# Patient Record
Sex: FEMALE | Race: WHITE | NOT HISPANIC OR LATINO | Employment: FULL TIME | URBAN - METROPOLITAN AREA
[De-identification: names, ages, dates, MRNs, and addresses within clinical notes are randomized per-mention and may not be internally consistent; named-entity substitution may affect disease eponyms.]

---

## 2017-06-15 ENCOUNTER — OFFICE VISIT (OUTPATIENT)
Dept: MEDICAL GROUP | Facility: MEDICAL CENTER | Age: 72
End: 2017-06-15
Payer: MEDICARE

## 2017-06-15 VITALS
RESPIRATION RATE: 14 BRPM | SYSTOLIC BLOOD PRESSURE: 138 MMHG | BODY MASS INDEX: 45.32 KG/M2 | DIASTOLIC BLOOD PRESSURE: 80 MMHG | TEMPERATURE: 98 F | HEIGHT: 65 IN | WEIGHT: 272 LBS

## 2017-06-15 DIAGNOSIS — M76.32 IT BAND SYNDROME, LEFT: ICD-10-CM

## 2017-06-15 DIAGNOSIS — Z12.11 SCREEN FOR COLON CANCER: ICD-10-CM

## 2017-06-15 DIAGNOSIS — Z13.6 SCREENING FOR CARDIOVASCULAR CONDITION: ICD-10-CM

## 2017-06-15 DIAGNOSIS — R53.83 FATIGUE, UNSPECIFIED TYPE: ICD-10-CM

## 2017-06-15 DIAGNOSIS — E66.01 MORBID OBESITY WITH BMI OF 45.0-49.9, ADULT (HCC): ICD-10-CM

## 2017-06-15 DIAGNOSIS — Z12.31 ENCOUNTER FOR SCREENING MAMMOGRAM FOR MALIGNANT NEOPLASM OF BREAST: ICD-10-CM

## 2017-06-15 DIAGNOSIS — M79.18 RHOMBOID MUSCLE PAIN: ICD-10-CM

## 2017-06-15 PROCEDURE — 99203 OFFICE O/P NEW LOW 30 MIN: CPT | Performed by: FAMILY MEDICINE

## 2017-06-15 ASSESSMENT — PATIENT HEALTH QUESTIONNAIRE - PHQ9: CLINICAL INTERPRETATION OF PHQ2 SCORE: 2

## 2017-06-15 NOTE — PROGRESS NOTES
cc:  Shoulder pain    Subjective:     Ruby M Emmerling is a 71 y.o. female presenting with her daughter and two grandkids to establish care:    1. Left shoulder pain: started about 3 weeks ago after waking up from a nap on her couch.  Sharp pain that occasionally radiates to her biceps.  Associated with some numbness in index and middle finger.  Takes advil a couple times a day with good relief.  Overall is improving.  No swelling, redness, tingling, weakness, fevers, neck pain. No recent increased activity    2. Left thigh pain: started over a year ago, stable.  Intermittent dull achy pain, doesn't radiate.  Associated with nothing.  better with rest. Worse with certain movements and after standing for long periods.      3. Blood pressure: was told in the past that she had hypertension, was prescribed lisinopril but she never took it.  Takes BPs at home, is less than 140/90. Denies any chest pain, shortness of breath, leg swelling, jaw claudication, lightheadedness, dizziness.      Review of systems:  See above.      Current outpatient prescriptions:   •  ASPIRIN PO, Take  by mouth., Disp: , Rfl:     Allergies   Allergen Reactions   • Demerol Vomiting       Past Medical History   Diagnosis Date   • Breast lump      lumpectomy     Past Surgical History   Procedure Laterality Date   • Ankle fusion     • Cholecystectomy       Family History   Problem Relation Age of Onset   • Cancer Mother    • Cancer Sister    • Breast Cancer Maternal Aunt    • Heart Disease Sister    • Cancer Sister      skin     Social History     Social History   • Marital Status: Single     Spouse Name: N/A   • Number of Children: N/A   • Years of Education: N/A     Occupational History   • Not on file.     Social History Main Topics   • Smoking status: Former Smoker   • Smokeless tobacco: Never Used   • Alcohol Use: No   • Drug Use: No   • Sexual Activity: Not on file     Other Topics Concern   • Not on file     Social History Narrative  "      Objective:     Vitals: /80 mmHg  Temp(Src) 36.7 °C (98 °F)  Resp 14  Ht 1.651 m (5' 5\")  Wt 123.378 kg (272 lb)  BMI 45.26 kg/m2  General: Alert, pleasant, NAD  HEENT: Normocephalic.  PERRL, EOMI, no icterus or pallor.  Conjunctivae and lids normal. External ears normal. Neck supple.  No thyromegaly or masses palpated. No cervical or supraclavicular lymphadenopathy.  Heart: Regular rate and rhythm.  S1 and S2 normal.  No murmurs appreciated.  Respiratory: Normal respiratory effort.  Clear to auscultation bilaterally.    Abdomen: Non-distended, soft  Skin: Warm, dry, no rashes.  Musculoskeletal: Gait is normal.  Moves all extremities well.  Extremities: No leg edema.    Neurological: No tremors  Psych:  Affect/mood is normal, judgement is good, memory is intact, grooming is appropriate.    Assessment/Plan:     Luda was seen today for shoulder pain and numbness.    Diagnoses and all orders for this visit:    IT band syndrome, left  Handout given of exercises and stretches to try. otc pain relief discussed    Rhomboid muscle pain  Handout given of exercises and stretches to try.  otc pain relief discussed    Morbid obesity with BMI of 45.0-49.9, adult (CMS-[facility])  -     Patient identified as having weight management issue.  Appropriate orders and counseling given.  -     LIPID PROFILE; Future  -     BLOOD GLUCOSE; Future    Screening for cardiovascular condition  -     LIPID PROFILE; Future  -     BLOOD GLUCOSE; Future    Fatigue, unspecified type  -     BLOOD GLUCOSE; Future    Screen for colon cancer  -     OCCULT BLOOD FECES IMMUNOASSAY; Future    Encounter for screening mammogram for malignant neoplasm of breast  -     MA-MAMMO SCREENING BILAT W/CELSO W/CAD; Future        Return in about 6 months (around 12/15/2017) for F/U blood pressure.  "

## 2017-06-15 NOTE — PATIENT INSTRUCTIONS
Hypertension  Hypertension is another name for high blood pressure. High blood pressure forces your heart to work harder to pump blood. A blood pressure reading has two numbers, which includes a higher number over a lower number (example: 110/72).  HOME CARE   · Have your blood pressure rechecked by your doctor.  · Only take medicine as told by your doctor. Follow the directions carefully. The medicine does not work as well if you skip doses. Skipping doses also puts you at risk for problems.  · Do not smoke.  · Monitor your blood pressure at home as told by your doctor.  GET HELP IF:  · You think you are having a reaction to the medicine you are taking.  · You have repeat headaches or feel dizzy.  · You have puffiness (swelling) in your ankles.  · You have trouble with your vision.  GET HELP RIGHT AWAY IF:   · You get a very bad headache and are confused.  · You feel weak, numb, or faint.  · You get chest or belly (abdominal) pain.  · You throw up (vomit).  · You cannot breathe very well.  MAKE SURE YOU:   · Understand these instructions.  · Will watch your condition.  · Will get help right away if you are not doing well or get worse.     This information is not intended to replace advice given to you by your health care provider. Make sure you discuss any questions you have with your health care provider.     Document Released: 06/05/2009 Document Revised: 12/23/2014 Document Reviewed: 10/10/2014  [employer] Interactive Patient Education ©2016 [employer]  Hypertension  Hypertension, commonly called high blood pressure, is when the force of blood pumping through your arteries is too strong. Your arteries are the blood vessels that carry blood from your heart throughout your body. A blood pressure reading consists of a higher number over a lower number, such as 110/72. The higher number (systolic) is the pressure inside your arteries when your heart pumps. The lower number (diastolic) is the pressure inside your  arteries when your heart relaxes. Ideally you want your blood pressure below 120/80.  Hypertension forces your heart to work harder to pump blood. Your arteries may become narrow or stiff. Having untreated or uncontrolled hypertension can cause heart attack, stroke, kidney disease, and other problems.  RISK FACTORS  Some risk factors for high blood pressure are controllable. Others are not.   Risk factors you cannot control include:   · Race. You may be at higher risk if you are .  · Age. Risk increases with age.  · Gender. Men are at higher risk than women before age 45 years. After age 65, women are at higher risk than men.  Risk factors you can control include:  · Not getting enough exercise or physical activity.  · Being overweight.  · Getting too much fat, sugar, calories, or salt in your diet.  · Drinking too much alcohol.  SIGNS AND SYMPTOMS  Hypertension does not usually cause signs or symptoms. Extremely high blood pressure (hypertensive crisis) may cause headache, anxiety, shortness of breath, and nosebleed.  DIAGNOSIS  To check if you have hypertension, your health care provider will measure your blood pressure while you are seated, with your arm held at the level of your heart. It should be measured at least twice using the same arm. Certain conditions can cause a difference in blood pressure between your right and left arms. A blood pressure reading that is higher than normal on one occasion does not mean that you need treatment. If it is not clear whether you have high blood pressure, you may be asked to return on a different day to have your blood pressure checked again. Or, you may be asked to monitor your blood pressure at home for 1 or more weeks.  TREATMENT  Treating high blood pressure includes making lifestyle changes and possibly taking medicine. Living a healthy lifestyle can help lower high blood pressure. You may need to change some of your habits.  Lifestyle changes may  include:  · Following the DASH diet. This diet is high in fruits, vegetables, and whole grains. It is low in salt, red meat, and added sugars.  · Keep your sodium intake below 2,300 mg per day.  · Getting at least 30-45 minutes of aerobic exercise at least 4 times per week.  · Losing weight if necessary.  · Not smoking.  · Limiting alcoholic beverages.  · Learning ways to reduce stress.  Your health care provider may prescribe medicine if lifestyle changes are not enough to get your blood pressure under control, and if one of the following is true:  · You are 18-59 years of age and your systolic blood pressure is above 140.  · You are 60 years of age or older, and your systolic blood pressure is above 150.  · Your diastolic blood pressure is above 90.  · You have diabetes, and your systolic blood pressure is over 140 or your diastolic blood pressure is over 90.  · You have kidney disease and your blood pressure is above 140/90.  · You have heart disease and your blood pressure is above 140/90.  Your personal target blood pressure may vary depending on your medical conditions, your age, and other factors.  HOME CARE INSTRUCTIONS  · Have your blood pressure rechecked as directed by your health care provider.    · Take medicines only as directed by your health care provider. Follow the directions carefully. Blood pressure medicines must be taken as prescribed. The medicine does not work as well when you skip doses. Skipping doses also puts you at risk for problems.  · Do not smoke.    · Monitor your blood pressure at home as directed by your health care provider.   SEEK MEDICAL CARE IF:   · You think you are having a reaction to medicines taken.  · You have recurrent headaches or feel dizzy.  · You have swelling in your ankles.  · You have trouble with your vision.  SEEK IMMEDIATE MEDICAL CARE IF:  · You develop a severe headache or confusion.  · You have unusual weakness, numbness, or feel faint.  · You have severe  chest or abdominal pain.  · You vomit repeatedly.  · You have trouble breathing.  MAKE SURE YOU:   · Understand these instructions.  · Will watch your condition.  · Will get help right away if you are not doing well or get worse.     This information is not intended to replace advice given to you by your health care provider. Make sure you discuss any questions you have with your health care provider.     Document Released: 12/18/2006 Document Revised: 05/03/2016 Document Reviewed: 10/10/2014  [employer] Interactive Patient Education ©2016 [employer]  Obesity  Obesity is defined as having too much total body fat and a body mass index (BMI) of 30 or more. BMI is an estimate of body fat and is calculated from your height and weight. BMI is typically calculated by your health care provider during regular wellness visits. Obesity happens when you consume more calories than you can burn by exercising or performing daily physical tasks. Prolonged obesity can cause major illnesses or emergencies, such as:  · Stroke.  · Heart disease.  · Diabetes.  · Cancer.  · Arthritis.  · High blood pressure (hypertension).  · High cholesterol.  · Sleep apnea.  · Erectile dysfunction.  · Infertility problems.  CAUSES   · Regularly eating unhealthy foods.  · Physical inactivity.  · Certain disorders, such as an underactive thyroid (hypothyroidism), Cushing's syndrome, and polycystic ovarian syndrome.  · Certain medicines, such as steroids, some depression medicines, and antipsychotics.  · Genetics.  · Lack of sleep.  DIAGNOSIS  A health care provider can diagnose obesity after calculating your BMI. Obesity will be diagnosed if your BMI is 30 or higher.  There are other methods of measuring obesity levels. Some other methods include measuring your skinfold thickness, your waist circumference, and comparing your hip circumference to your waist circumference.  TREATMENT   A healthy treatment program includes some or all of the  following:  · Long-term dietary changes.  · Exercise and physical activity.  · Behavioral and lifestyle changes.  · Medicine only under the supervision of your health care provider. Medicines may help, but only if they are used with diet and exercise programs.  If your BMI is 40 or higher, your health care provider may recommend specialized surgery or programs to help with weight loss.  An unhealthy treatment program includes:  · Fasting.  · Fad diets.  · Supplements and drugs.  These choices do not succeed in long-term weight control.  HOME CARE INSTRUCTIONS  · Exercise and perform physical activity as directed by your health care provider. To increase physical activity, try the following:  ¨ Use stairs instead of elevators.  ¨ Park farther away from store entrances.  ¨ Garden, bike, or walk instead of watching television or using the computer.  · Eat healthy, low-calorie foods and drinks on a regular basis. Eat more fruits and vegetables. Use low-calorie cookbooks or take healthy cooking classes.  · Limit fast food, sweets, and processed snack foods.  · Eat smaller portions.  · Keep a daily journal of everything you eat. There are many free websites to help you with this. It may be helpful to measure your foods so you can determine if you are eating the correct portion sizes.  · Avoid drinking alcohol. Drink more water and drinks without calories.  · Take vitamins and supplements only as recommended by your health care provider.  · Weight-loss support groups, registered dietitians, counselors, and stress reduction education can also be very helpful.  SEEK IMMEDIATE MEDICAL CARE IF:  · You have chest pain or tightness.  · You have trouble breathing or feel short of breath.  · You have weakness or leg numbness.  · You feel confused or have trouble talking.  · You have sudden changes in your vision.     This information is not intended to replace advice given to you by your health care provider. Make sure you discuss  any questions you have with your health care provider.     Document Released: 01/25/2006 Document Revised: 01/08/2016 Document Reviewed: 01/23/2013  [employer] Interactive Patient Education ©2016 [employer]

## 2017-06-15 NOTE — MR AVS SNAPSHOT
" Ruby M Emmerling   6/15/2017 3:40 PM   Office Visit   MRN: [id number]    Department:  [address] [place] [address]   Dept Phone:  [phone]    Description:  Female : 1945   Provider:  Lnae Dempsey M.D.           Reason for Visit     Shoulder Pain left pain    Numbness left side      Allergies as of 6/15/2017     Allergen Noted Reactions    Demerol 2013   Vomiting      You were diagnosed with     Morbid obesity with BMI of 45.0-49.9, adult ([facility])   [622973]       Screening for cardiovascular condition   [954929]       Fatigue, unspecified type   [9234878]       Screen for colon cancer   [312413]       Encounter for screening mammogram for malignant neoplasm of breast   [929899]         Vital Signs     Blood Pressure Temperature Respirations Height Weight Body Mass Index    138/80 mmHg 36.7 °C (98 °F) 14 1.651 m (5' 5\") 123.378 kg (272 lb) 45.26 kg/m2    Smoking Status                   Former Smoker           Basic Information     Date Of Birth Sex Race Ethnicity Preferred Language    1945 Female White Non- English      Your appointments     Dec 18, 2017 10:00 AM   Established Patient with Lane Dempsey M.D.   [facility] [address]    [address] [place] [id number] [phone]           You will be receiving a confirmation call a few days before your appointment from our automated call confirmation system.              Problem List              ICD-10-CM Priority Class Noted - Resolved    Morbid obesity with BMI of 45.0-49.9, adult ([facility]) E66.01, Z68.42   6/15/2017 - Present      Health Maintenance     Patient has no pending health maintenance at this time      Current Immunizations     Influenza TIV (IM) 2012      Below and/or attached are the medications your provider expects you to take. Review all of your home medications and newly ordered medications with your provider and/or pharmacist. Follow medication instructions as " directed by your provider and/or pharmacist. Please keep your medication list with you and share with your provider. Update the information when medications are discontinued, doses are changed, or new medications (including over-the-counter products) are added; and carry medication information at all times in the event of emergency situations     Allergies:  DEMEROL - Vomiting               Medications  Valid as of: Marilee 15, 2017 -  4:23 PM    Generic Name Brand Name Tablet Size Instructions for use    Aspirin   Take  by mouth.        .                 Medicines prescribed today were sent to:     [address] ([place]), [place] [address]    [facility] [place] [place] [address]    Phone: [phone] Fax: [phone]    Open 24 Hours?: No      Medication refill instructions:       If your prescription bottle indicates you have medication refills left, it is not necessary to call your provider’s office. Please contact your pharmacy and they will refill your medication.    If your prescription bottle indicates you do not have any refills left, you may request refills at any time through one of the following ways: The online [employer] system (except Urgent Care), by calling your provider’s office, or by asking your pharmacy to contact your provider’s office with a refill request. Medication refills are processed only during regular business hours and may not be available until the next business day. Your provider may request additional information or to have a follow-up visit with you prior to refilling your medication.   *Please Note: Medication refills are assigned a new Rx number when refilled electronically. Your pharmacy may indicate that no refills were authorized even though a new prescription for the same medication is available at the pharmacy. Please request the medicine by name with the pharmacy before contacting your provider for a refill.        Your To Do List     Future Labs/Procedures  Complete By Expires    BLOOD GLUCOSE  As directed 6/15/2018    LIPID PROFILE  As directed 6/15/2018    MA-MAMMO SCREENING BILAT W/CELOS W/CAD  As directed 6/15/2018    OCCULT BLOOD FECES IMMUNOASSAY  As directed 6/15/2018      Instructions    Hypertension  Hypertension is another name for high blood pressure. High blood pressure forces your heart to work harder to pump blood. A blood pressure reading has two numbers, which includes a higher number over a lower number (example: 110/72).  HOME CARE   · Have your blood pressure rechecked by your doctor.  · Only take medicine as told by your doctor. Follow the directions carefully. The medicine does not work as well if you skip doses. Skipping doses also puts you at risk for problems.  · Do not smoke.  · Monitor your blood pressure at home as told by your doctor.  GET HELP IF:  · You think you are having a reaction to the medicine you are taking.  · You have repeat headaches or feel dizzy.  · You have puffiness (swelling) in your ankles.  · You have trouble with your vision.  GET HELP RIGHT AWAY IF:   · You get a very bad headache and are confused.  · You feel weak, numb, or faint.  · You get chest or belly (abdominal) pain.  · You throw up (vomit).  · You cannot breathe very well.  MAKE SURE YOU:   · Understand these instructions.  · Will watch your condition.  · Will get help right away if you are not doing well or get worse.     This information is not intended to replace advice given to you by your health care provider. Make sure you discuss any questions you have with your health care provider.     Document Released: 06/05/2009 Document Revised: 12/23/2014 Document Reviewed: 10/10/2014  [employer] Interactive Patient Education ©2016 [employer]  Hypertension  Hypertension, commonly called high blood pressure, is when the force of blood pumping through your arteries is too strong. Your arteries are the blood vessels that carry blood from your heart throughout your  body. A blood pressure reading consists of a higher number over a lower number, such as 110/72. The higher number (systolic) is the pressure inside your arteries when your heart pumps. The lower number (diastolic) is the pressure inside your arteries when your heart relaxes. Ideally you want your blood pressure below 120/80.  Hypertension forces your heart to work harder to pump blood. Your arteries may become narrow or stiff. Having untreated or uncontrolled hypertension can cause heart attack, stroke, kidney disease, and other problems.  RISK FACTORS  Some risk factors for high blood pressure are controllable. Others are not.   Risk factors you cannot control include:   · Race. You may be at higher risk if you are .  · Age. Risk increases with age.  · Gender. Men are at higher risk than women before age 45 years. After age 65, women are at higher risk than men.  Risk factors you can control include:  · Not getting enough exercise or physical activity.  · Being overweight.  · Getting too much fat, sugar, calories, or salt in your diet.  · Drinking too much alcohol.  SIGNS AND SYMPTOMS  Hypertension does not usually cause signs or symptoms. Extremely high blood pressure (hypertensive crisis) may cause headache, anxiety, shortness of breath, and nosebleed.  DIAGNOSIS  To check if you have hypertension, your health care provider will measure your blood pressure while you are seated, with your arm held at the level of your heart. It should be measured at least twice using the same arm. Certain conditions can cause a difference in blood pressure between your right and left arms. A blood pressure reading that is higher than normal on one occasion does not mean that you need treatment. If it is not clear whether you have high blood pressure, you may be asked to return on a different day to have your blood pressure checked again. Or, you may be asked to monitor your blood pressure at home for 1 or more  weeks.  TREATMENT  Treating high blood pressure includes making lifestyle changes and possibly taking medicine. Living a healthy lifestyle can help lower high blood pressure. You may need to change some of your habits.  Lifestyle changes may include:  · Following the DASH diet. This diet is high in fruits, vegetables, and whole grains. It is low in salt, red meat, and added sugars.  · Keep your sodium intake below 2,300 mg per day.  · Getting at least 30-45 minutes of aerobic exercise at least 4 times per week.  · Losing weight if necessary.  · Not smoking.  · Limiting alcoholic beverages.  · Learning ways to reduce stress.  Your health care provider may prescribe medicine if lifestyle changes are not enough to get your blood pressure under control, and if one of the following is true:  · You are 18-59 years of age and your systolic blood pressure is above 140.  · You are 60 years of age or older, and your systolic blood pressure is above 150.  · Your diastolic blood pressure is above 90.  · You have diabetes, and your systolic blood pressure is over 140 or your diastolic blood pressure is over 90.  · You have kidney disease and your blood pressure is above 140/90.  · You have heart disease and your blood pressure is above 140/90.  Your personal target blood pressure may vary depending on your medical conditions, your age, and other factors.  HOME CARE INSTRUCTIONS  · Have your blood pressure rechecked as directed by your health care provider.    · Take medicines only as directed by your health care provider. Follow the directions carefully. Blood pressure medicines must be taken as prescribed. The medicine does not work as well when you skip doses. Skipping doses also puts you at risk for problems.  · Do not smoke.    · Monitor your blood pressure at home as directed by your health care provider.   SEEK MEDICAL CARE IF:   · You think you are having a reaction to medicines taken.  · You have recurrent headaches or  feel dizzy.  · You have swelling in your ankles.  · You have trouble with your vision.  SEEK IMMEDIATE MEDICAL CARE IF:  · You develop a severe headache or confusion.  · You have unusual weakness, numbness, or feel faint.  · You have severe chest or abdominal pain.  · You vomit repeatedly.  · You have trouble breathing.  MAKE SURE YOU:   · Understand these instructions.  · Will watch your condition.  · Will get help right away if you are not doing well or get worse.     This information is not intended to replace advice given to you by your health care provider. Make sure you discuss any questions you have with your health care provider.     Document Released: 12/18/2006 Document Revised: 05/03/2016 Document Reviewed: 10/10/2014  [employer] Interactive Patient Education ©2016 [employer] Inc.  Obesity  Obesity is defined as having too much total body fat and a body mass index (BMI) of 30 or more. BMI is an estimate of body fat and is calculated from your height and weight. BMI is typically calculated by your health care provider during regular wellness visits. Obesity happens when you consume more calories than you can burn by exercising or performing daily physical tasks. Prolonged obesity can cause major illnesses or emergencies, such as:  · Stroke.  · Heart disease.  · Diabetes.  · Cancer.  · Arthritis.  · High blood pressure (hypertension).  · High cholesterol.  · Sleep apnea.  · Erectile dysfunction.  · Infertility problems.  CAUSES   · Regularly eating unhealthy foods.  · Physical inactivity.  · Certain disorders, such as an underactive thyroid (hypothyroidism), Cushing's syndrome, and polycystic ovarian syndrome.  · Certain medicines, such as steroids, some depression medicines, and antipsychotics.  · Genetics.  · Lack of sleep.  DIAGNOSIS  A health care provider can diagnose obesity after calculating your BMI. Obesity will be diagnosed if your BMI is 30 or higher.  There are other methods of measuring obesity  levels. Some other methods include measuring your skinfold thickness, your waist circumference, and comparing your hip circumference to your waist circumference.  TREATMENT   A healthy treatment program includes some or all of the following:  · Long-term dietary changes.  · Exercise and physical activity.  · Behavioral and lifestyle changes.  · Medicine only under the supervision of your health care provider. Medicines may help, but only if they are used with diet and exercise programs.  If your BMI is 40 or higher, your health care provider may recommend specialized surgery or programs to help with weight loss.  An unhealthy treatment program includes:  · Fasting.  · Fad diets.  · Supplements and drugs.  These choices do not succeed in long-term weight control.  HOME CARE INSTRUCTIONS  · Exercise and perform physical activity as directed by your health care provider. To increase physical activity, try the following:  ¨ Use stairs instead of elevators.  ¨ Park farther away from store entrances.  ¨ Garden, bike, or walk instead of watching television or using the computer.  · Eat healthy, low-calorie foods and drinks on a regular basis. Eat more fruits and vegetables. Use low-calorie cookbooks or take healthy cooking classes.  · Limit fast food, sweets, and processed snack foods.  · Eat smaller portions.  · Keep a daily journal of everything you eat. There are many free websites to help you with this. It may be helpful to measure your foods so you can determine if you are eating the correct portion sizes.  · Avoid drinking alcohol. Drink more water and drinks without calories.  · Take vitamins and supplements only as recommended by your health care provider.  · Weight-loss support groups, registered dietitians, counselors, and stress reduction education can also be very helpful.  SEEK IMMEDIATE MEDICAL CARE IF:  · You have chest pain or tightness.  · You have trouble breathing or feel short of breath.  · You have  weakness or leg numbness.  · You feel confused or have trouble talking.  · You have sudden changes in your vision.     This information is not intended to replace advice given to you by your health care provider. Make sure you discuss any questions you have with your health care provider.     Document Released: 01/25/2006 Document Revised: 01/08/2016 Document Reviewed: 01/23/2013  [facility] Interactive Patient Education ©2016 Elsevier Inc.            [employer] Access Code: Activation code not generated  Current [employer] Status: Active

## 2018-05-14 ENCOUNTER — PATIENT OUTREACH (OUTPATIENT)
Dept: HEALTH INFORMATION MANAGEMENT | Facility: OTHER | Age: 73
End: 2018-05-14

## 2018-05-14 NOTE — PROGRESS NOTES
Outcome: Not available    Please transfer to Patient Outreach Team at [phone] when patient returns call.    [facility]Connect Verified: yes    Attempt # 1

## 2018-06-11 NOTE — PROGRESS NOTES
Outcome: Left Message    Please transfer to Patient Outreach Team at [phone] when patient returns call.    Attempt # 2

## 2018-10-15 ENCOUNTER — OFFICE VISIT (OUTPATIENT)
Dept: MEDICAL GROUP | Facility: MEDICAL CENTER | Age: 73
End: 2018-10-15
Payer: MEDICARE

## 2018-10-15 VITALS
SYSTOLIC BLOOD PRESSURE: 124 MMHG | WEIGHT: 264.99 LBS | OXYGEN SATURATION: 96 % | RESPIRATION RATE: 16 BRPM | HEART RATE: 78 BPM | BODY MASS INDEX: 44.15 KG/M2 | TEMPERATURE: 97.8 F | DIASTOLIC BLOOD PRESSURE: 86 MMHG | HEIGHT: 65 IN

## 2018-10-15 DIAGNOSIS — Z12.11 COLON CANCER SCREENING: ICD-10-CM

## 2018-10-15 DIAGNOSIS — M81.0 POSTMENOPAUSAL BONE LOSS: ICD-10-CM

## 2018-10-15 DIAGNOSIS — M54.42 CHRONIC LEFT-SIDED LOW BACK PAIN WITH LEFT-SIDED SCIATICA: ICD-10-CM

## 2018-10-15 DIAGNOSIS — E78.2 MIXED HYPERLIPIDEMIA: ICD-10-CM

## 2018-10-15 DIAGNOSIS — Z12.39 BREAST CANCER SCREENING: ICD-10-CM

## 2018-10-15 DIAGNOSIS — G89.29 CHRONIC LEFT-SIDED LOW BACK PAIN WITH LEFT-SIDED SCIATICA: ICD-10-CM

## 2018-10-15 DIAGNOSIS — I10 ESSENTIAL HYPERTENSION: ICD-10-CM

## 2018-10-15 DIAGNOSIS — Z00.00 HEALTH CARE MAINTENANCE: ICD-10-CM

## 2018-10-15 DIAGNOSIS — E66.01 MORBID OBESITY WITH BMI OF 45.0-49.9, ADULT (HCC): ICD-10-CM

## 2018-10-15 PROCEDURE — 90662 IIV NO PRSV INCREASED AG IM: CPT | Performed by: FAMILY MEDICINE

## 2018-10-15 PROCEDURE — 99204 OFFICE O/P NEW MOD 45 MIN: CPT | Mod: 25 | Performed by: FAMILY MEDICINE

## 2018-10-15 PROCEDURE — G0008 ADMIN INFLUENZA VIRUS VAC: HCPCS | Performed by: FAMILY MEDICINE

## 2018-10-15 ASSESSMENT — PATIENT HEALTH QUESTIONNAIRE - PHQ9: CLINICAL INTERPRETATION OF PHQ2 SCORE: 0

## 2018-10-15 NOTE — PROGRESS NOTES
CC: Establish a new PCP( hyperlipidemia, hypertension, chronic back pain,)     HPI:  Luda presents today to establish a new PCP relationship.    Patient has been active and independent with all ADLs. Has the following medical issues:    Mixed hyperlipidemia  Her lst lipid panel was checked in 2013. Hasn't been on medications. No history of CAD, diabetes, or stroke.    Essential hypertension   Patient was told in the past that she has high blood pressure that she hasn't been on medications. However she has been controlling it with low salt diet.    Chronic left-sided low back pain with left-sided sciatica   Patient has been having chronic lower back pain. Denies any radiation, or leg pain or weakness. The pain is more when she walks. She has been on over-the-counter pain medications and it has been helping to some extent. Has been having normal bladder and bowel movement control.    Morbid obesity with BMI of 45.0-49.9, adult (HCC)  BMI is 44. Patient is counseled about the medical rationale for weight loss in obese individuals is that obesity is associated with a significant increase in mortality, and many health risks including type 2 diabetes mellitus, hypertension, dyslipidemia, and coronary heart disease. The benefits of weight loss include a reduction in the rate of progression from impaired glucose tolerance to diabetes, blood pressure in hypertensive patients, and lipid levels in higher risk patients. Other noncardiac benefits of weight loss include reductions in urinary incontinence, sleep apnea, and depression, and improvements in quality of life, physical functioning, and mobility.Recommend lifestyle modification: exercise 30 minutes per day 5 days per week. Recommend also portion control.    She is due for flu shot, mammogram, bone density, colonoscopy. She declined the colonoscopy wants to do the fit test    Patient Active Problem List    Diagnosis Date Noted   • Mixed hyperlipidemia 10/15/2018   •  "Essential hypertension 10/15/2018   • Chronic left-sided low back pain with left-sided sciatica 10/15/2018   • Morbid obesity with BMI of 45.0-49.9, adult ([facility]) 06/15/2017       Current Outpatient Prescriptions   Medication Sig Dispense Refill   • ASPIRIN PO Take  by mouth.       No current facility-administered medications for this visit.          Allergies as of 10/15/2018 - Reviewed 10/15/2018   Allergen Reaction Noted   • Demerol Vomiting 02/07/2013        Social History     Social History   • Marital status: Single     Spouse name: N/A   • Number of children: N/A   • Years of education: N/A     Occupational History   • Not on file.     Social History Main Topics   • Smoking status: Former Smoker   • Smokeless tobacco: Never Used   • Alcohol use No   • Drug use: No   • Sexual activity: Not on file     Other Topics Concern   • Not on file     Social History Narrative    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[employer]       Family History   Problem Relation Age of Onset   • Cancer Mother    • Cancer Sister    • Breast Cancer Maternal Aunt    • Heart Disease Sister    • Cancer Sister         skin       Past Surgical History:   Procedure Laterality Date   • ANKLE FUSION     • CHOLECYSTECTOMY         ROS:  Denies any Headache, Blurred Vision, Confusion Chest pain,  Shortness of breath,  Abdominal pain, Changes of bowel or bladder, Lower ext edema, Fevers, Nights sweats, Weight Changes, Focal weakness or numbness.  All other systems are negative.    /86 (BP Location: Right arm, Patient Position: Sitting, BP Cuff Size: Adult)   Pulse 78   Temp 36.6 °C (97.8 °F)   Resp 16   Ht 1.651 m (5' 5\")   Wt 120.2 kg (264 lb 15.9 oz)   SpO2 96%   BMI 44.10 kg/m²     Physical Exam:  Gen:         Alert and oriented, No apparent distress.  HEENT:   Perrla, TM clear,  Oralpharynx no erythema or exudates.  Neck:       No Jugular venous distension, Lymphadenopathy, Thyromegaly, Bruits.  Lungs:     Clear to auscultation bilaterally  CV:      "     Regular rate and rhythm. No murmurs, rubs or gallops.  Abd:         Soft non tender, non distended. Normal active bowel sounds. No                                        Hepatosplenomegaly, No pulsatile masses.  Ext:          No clubbing, cyanosis, edema.      Assessment and Plan.   73 y.o. female     1. Mixed hyperlipidemia  Last lipid panel was checked in 2013. Hasn't been on medications.  Patient is counseled about lifestyle modifications..  BMI is 44, patient is referred to [facility], and weight management.    - LIPID PANEL  - TSH; Future    2. Essential hypertension  Adequately controlled on low salt diet .    - CBC WITH DIFFERENTIAL; Future  - COMP METABOLIC PANEL; Future  - TSH; Future    3. Chronic left-sided low back pain with left-sided sciatica  Stable. Comes and goes. Probably related to our obesity.  Weight loss is recommended. Will send to a weight program  Continue over-the-counter pain medication.    4. Health care maintenance  Flu shot is given today.    - INFLUENZA VACCINE, HIGH DOSE (65+ ONLY)    5. Breast cancer screening    - MA-SCREEN MAMMO W/CAD-BILAT; Future    6. Postmenopausal bone loss    - DS-BONE DENSITY STUDY (DEXA); Future    7. Morbid obesity with BMI of 45.0-49.9, adult (HCC)  BMI is 44. Patient is counseled about lifestyle modification.  Will send patient to [facility] improvement and weight program.    - REFERRAL TO "[employer] IMPROVEMENT PROGRAMS (HIP) Services Requested: [facility]; Reason for Referral? BMI>30; Reason for Visit: Overweight/Obesity    8. Colon cancer screening  Declines a colonoscopy is okay to do fit test.    - OCCULT BLOOD FECES IMMUNOASSAY; Future

## 2018-10-24 ENCOUNTER — PATIENT OUTREACH (OUTPATIENT)
Dept: HEALTH INFORMATION MANAGEMENT | Facility: OTHER | Age: 73
End: 2018-10-24

## 2018-10-24 NOTE — PROGRESS NOTES
Outcome: Left Message    Please transfer to Patient Outreach Team at [phone] when patient returns call.    [employer] Checked & Epic Updated:  yes    [facility]Connect Verified: yes    Attempt # 1

## 2018-11-01 NOTE — PROGRESS NOTES
Outcome: Left Message    Please transfer to Patient Outreach Team at [phone] when patient returns call.      Attempt # 2

## 2018-11-12 ENCOUNTER — HOSPITAL ENCOUNTER (OUTPATIENT)
Facility: MEDICAL CENTER | Age: 73
End: 2018-11-12
Attending: FAMILY MEDICINE
Payer: MEDICARE

## 2018-11-12 PROCEDURE — 82274 ASSAY TEST FOR BLOOD FECAL: CPT

## 2018-11-12 NOTE — PROGRESS NOTES
Outcome: Left Message    Please transfer to Patient Outreach Team at [phone] when patient returns call.    Attempt # 4

## 2018-11-15 DIAGNOSIS — Z12.11 COLON CANCER SCREENING: ICD-10-CM

## 2018-11-15 LAB — AMBIGUOUS DTTM AMBI4: NORMAL

## 2018-11-16 LAB — HEMOCCULT STL QL IA: NEGATIVE

## 2018-11-28 ENCOUNTER — HOSPITAL ENCOUNTER (OUTPATIENT)
Dept: LAB | Facility: MEDICAL CENTER | Age: 73
End: 2018-11-28
Attending: FAMILY MEDICINE
Payer: MEDICARE

## 2018-11-28 ENCOUNTER — HOSPITAL ENCOUNTER (OUTPATIENT)
Dept: RADIOLOGY | Facility: MEDICAL CENTER | Age: 73
End: 2018-11-28
Attending: FAMILY MEDICINE
Payer: MEDICARE

## 2018-11-28 DIAGNOSIS — I10 ESSENTIAL HYPERTENSION: ICD-10-CM

## 2018-11-28 DIAGNOSIS — Z12.39 BREAST CANCER SCREENING: ICD-10-CM

## 2018-11-28 DIAGNOSIS — E78.2 MIXED HYPERLIPIDEMIA: ICD-10-CM

## 2018-11-28 DIAGNOSIS — M81.0 POSTMENOPAUSAL BONE LOSS: ICD-10-CM

## 2018-11-28 LAB
ALBUMIN SERPL BCP-MCNC: 4.3 G/DL (ref 3.2–4.9)
ALBUMIN/GLOB SERPL: 1.7 G/DL
ALP SERPL-CCNC: 71 U/L (ref 30–99)
ALT SERPL-CCNC: 37 U/L (ref 2–50)
ANION GAP SERPL CALC-SCNC: 5 MMOL/L (ref 0–11.9)
AST SERPL-CCNC: 27 U/L (ref 12–45)
BASOPHILS # BLD AUTO: 0.5 % (ref 0–1.8)
BASOPHILS # BLD: 0.04 K/UL (ref 0–0.12)
BILIRUB SERPL-MCNC: 1.2 MG/DL (ref 0.1–1.5)
BUN SERPL-MCNC: 20 MG/DL (ref 8–22)
CALCIUM SERPL-MCNC: 9.1 MG/DL (ref 8.5–10.5)
CHLORIDE SERPL-SCNC: 107 MMOL/L (ref 96–112)
CHOLEST SERPL-MCNC: 158 MG/DL (ref 100–199)
CO2 SERPL-SCNC: 28 MMOL/L (ref 20–33)
CREAT SERPL-MCNC: 0.88 MG/DL (ref 0.5–1.4)
EOSINOPHIL # BLD AUTO: 0.17 K/UL (ref 0–0.51)
EOSINOPHIL NFR BLD: 2.1 % (ref 0–6.9)
ERYTHROCYTE [DISTWIDTH] IN BLOOD BY AUTOMATED COUNT: 45.3 FL (ref 35.9–50)
FASTING STATUS PATIENT QL REPORTED: NORMAL
GLOBULIN SER CALC-MCNC: 2.6 G/DL (ref 1.9–3.5)
GLUCOSE SERPL-MCNC: 98 MG/DL (ref 65–99)
HCT VFR BLD AUTO: 39.6 % (ref 37–47)
HDLC SERPL-MCNC: 42 MG/DL
HGB BLD-MCNC: 12.8 G/DL (ref 12–16)
IMM GRANULOCYTES # BLD AUTO: 0.03 K/UL (ref 0–0.11)
IMM GRANULOCYTES NFR BLD AUTO: 0.4 % (ref 0–0.9)
LDLC SERPL CALC-MCNC: 91 MG/DL
LYMPHOCYTES # BLD AUTO: 1.51 K/UL (ref 1–4.8)
LYMPHOCYTES NFR BLD: 18.8 % (ref 22–41)
MCH RBC QN AUTO: 29.3 PG (ref 27–33)
MCHC RBC AUTO-ENTMCNC: 32.3 G/DL (ref 33.6–35)
MCV RBC AUTO: 90.6 FL (ref 81.4–97.8)
MONOCYTES # BLD AUTO: 0.47 K/UL (ref 0–0.85)
MONOCYTES NFR BLD AUTO: 5.8 % (ref 0–13.4)
NEUTROPHILS # BLD AUTO: 5.82 K/UL (ref 2–7.15)
NEUTROPHILS NFR BLD: 72.4 % (ref 44–72)
NRBC # BLD AUTO: 0 K/UL
NRBC BLD-RTO: 0 /100 WBC
PLATELET # BLD AUTO: 315 K/UL (ref 164–446)
PMV BLD AUTO: 9.9 FL (ref 9–12.9)
POTASSIUM SERPL-SCNC: 4.2 MMOL/L (ref 3.6–5.5)
PROT SERPL-MCNC: 6.9 G/DL (ref 6–8.2)
RBC # BLD AUTO: 4.37 M/UL (ref 4.2–5.4)
SODIUM SERPL-SCNC: 140 MMOL/L (ref 135–145)
TRIGL SERPL-MCNC: 123 MG/DL (ref 0–149)
TSH SERPL DL<=0.005 MIU/L-ACNC: 5.13 UIU/ML (ref 0.38–5.33)
WBC # BLD AUTO: 8 K/UL (ref 4.8–10.8)

## 2018-11-28 PROCEDURE — 77067 SCR MAMMO BI INCL CAD: CPT

## 2018-11-28 PROCEDURE — 85025 COMPLETE CBC W/AUTO DIFF WBC: CPT

## 2018-11-28 PROCEDURE — 80053 COMPREHEN METABOLIC PANEL: CPT

## 2018-11-28 PROCEDURE — 80061 LIPID PANEL: CPT

## 2018-11-28 PROCEDURE — 77080 DXA BONE DENSITY AXIAL: CPT

## 2018-11-28 PROCEDURE — 84443 ASSAY THYROID STIM HORMONE: CPT

## 2018-11-28 PROCEDURE — 36415 COLL VENOUS BLD VENIPUNCTURE: CPT

## 2018-12-18 ENCOUNTER — APPOINTMENT (OUTPATIENT)
Dept: HEALTH INFORMATION MANAGEMENT | Facility: MEDICAL CENTER | Age: 73
End: 2018-12-18
Payer: MEDICARE

## 2019-01-15 ENCOUNTER — TELEPHONE (OUTPATIENT)
Dept: HEALTH INFORMATION MANAGEMENT | Facility: MEDICAL CENTER | Age: 74
End: 2019-01-15

## 2019-01-21 ENCOUNTER — OFFICE VISIT (OUTPATIENT)
Dept: MEDICAL GROUP | Facility: MEDICAL CENTER | Age: 74
End: 2019-01-21
Payer: MEDICARE

## 2019-01-21 ENCOUNTER — OFFICE VISIT (OUTPATIENT)
Dept: HEALTH INFORMATION MANAGEMENT | Facility: MEDICAL CENTER | Age: 74
End: 2019-01-21
Payer: MEDICARE

## 2019-01-21 VITALS
TEMPERATURE: 98.1 F | HEART RATE: 84 BPM | SYSTOLIC BLOOD PRESSURE: 128 MMHG | BODY MASS INDEX: 46.64 KG/M2 | OXYGEN SATURATION: 94 % | WEIGHT: 263.23 LBS | DIASTOLIC BLOOD PRESSURE: 82 MMHG | RESPIRATION RATE: 16 BRPM | HEIGHT: 63 IN

## 2019-01-21 VITALS
DIASTOLIC BLOOD PRESSURE: 76 MMHG | WEIGHT: 261.6 LBS | HEIGHT: 63 IN | OXYGEN SATURATION: 95 % | SYSTOLIC BLOOD PRESSURE: 124 MMHG | HEART RATE: 90 BPM | BODY MASS INDEX: 46.35 KG/M2

## 2019-01-21 DIAGNOSIS — E66.01 MORBID OBESITY WITH BMI OF 45.0-49.9, ADULT (HCC): ICD-10-CM

## 2019-01-21 DIAGNOSIS — R63.2 BINGE EATING: ICD-10-CM

## 2019-01-21 DIAGNOSIS — I10 ESSENTIAL HYPERTENSION: ICD-10-CM

## 2019-01-21 DIAGNOSIS — G89.29 CHRONIC LEFT-SIDED LOW BACK PAIN WITH LEFT-SIDED SCIATICA: ICD-10-CM

## 2019-01-21 DIAGNOSIS — Z00.00 MEDICARE ANNUAL WELLNESS VISIT, INITIAL: ICD-10-CM

## 2019-01-21 DIAGNOSIS — E78.2 MIXED HYPERLIPIDEMIA: ICD-10-CM

## 2019-01-21 DIAGNOSIS — R53.82 CHRONIC FATIGUE: ICD-10-CM

## 2019-01-21 DIAGNOSIS — Z12.11 COLON CANCER SCREENING: ICD-10-CM

## 2019-01-21 DIAGNOSIS — M54.42 CHRONIC LEFT-SIDED LOW BACK PAIN WITH LEFT-SIDED SCIATICA: ICD-10-CM

## 2019-01-21 DIAGNOSIS — E88.810 METABOLIC SYNDROME: ICD-10-CM

## 2019-01-21 PROCEDURE — 93000 ELECTROCARDIOGRAM COMPLETE: CPT | Performed by: INTERNAL MEDICINE

## 2019-01-21 PROCEDURE — 99205 OFFICE O/P NEW HI 60 MIN: CPT | Mod: 25 | Performed by: INTERNAL MEDICINE

## 2019-01-21 PROCEDURE — G0438 PPPS, INITIAL VISIT: HCPCS | Performed by: FAMILY MEDICINE

## 2019-01-21 ASSESSMENT — PATIENT HEALTH QUESTIONNAIRE - PHQ9: CLINICAL INTERPRETATION OF PHQ2 SCORE: 0

## 2019-01-21 ASSESSMENT — ENCOUNTER SYMPTOMS: GENERAL WELL-BEING: GOOD

## 2019-01-21 ASSESSMENT — ACTIVITIES OF DAILY LIVING (ADL): BATHING_REQUIRES_ASSISTANCE: 0

## 2019-01-21 NOTE — PROGRESS NOTES
Annual Health Assessment Questions:    1.  Are you currently engaging in any exercise or physical activity? No    2.  How would you describe your mood or emotional well-being today? good    3.  Have you had any falls in the last year? No    4.  Have you noticed any problems with your balance or had difficulty walking? No    5.  In the last six months have you experienced any leakage of urine? No    6. DPA/Advanced Directive: Patient does not have an Advanced Directive.  A packet and workshop information was given on Advanced Directives.         Chief Complaint   Patient presents with   • Annual Exam         HPI:  Ruby Madgline Emmerling is a 73 y.o. here for Medicare Annual Wellness Visit      Medicare annual wellness visit, initial  Preventive measures and chronic medical issues reviewed.    Essential hypertension  Blood pressure has been adequately controlled on low-salt diet.  Patient has been trying to exercise to help with reducing her weight.    Mixed hyperlipidemia  Adequately controlled on diet.  Last lipid panel showed no significant abnormality.  No history of CAD, diabetes, or stroke.    Morbid obesity with BMI of 45.0-49.9, adult (HCC)  BMI is 46.Patient is counseled about the medical rationale for weight loss in obese individuals is that obesity is associated with a significant increase in mortality, and many health risks including type 2 diabetes mellitus, hypertension, dyslipidemia, and coronary heart disease. The benefits of weight loss include a reduction in the rate of progression from impaired glucose tolerance to diabetes, blood pressure in hypertensive patients, and lipid levels in higher risk patients. Other noncardiac benefits of weight loss include reductions in urinary incontinence, sleep apnea, and depression, and improvements in quality of life, physical functioning, and mobility.Recommend lifestyle modification: exercise 30 minutes per day 5 days per week. Recommend also portion control.  Patient is following up with with obesity medicine team.    Due for colonoscopy.       Patient Active Problem List    Diagnosis Date Noted   • Chronic fatigue 01/21/2019   • Binge eating 01/21/2019   • Metabolic syndrome 01/21/2019   • Mixed hyperlipidemia 10/15/2018   • Essential hypertension 10/15/2018   • Chronic left-sided low back pain with left-sided sciatica 10/15/2018   • Morbid obesity with BMI of 45.0-49.9, adult ([facility]) 06/15/2017       Current Outpatient Prescriptions   Medication Sig Dispense Refill   • Ibuprofen (ADVIL PO) Take  by mouth.     • MAGNESIUM PO Take  by mouth.     • vitamin D (CHOLECALCIFEROL) 1000 UNIT Tab Take 1,000 Units by mouth every day.     • POTASSIUM PO Take  by mouth.     • ECHINACEA PO Take  by mouth.     • ASPIRIN PO Take  by mouth.       No current facility-administered medications for this visit.             Current supplements as per medication list.       Allergies: Demerol    Current social contact/activities:      She  reports that she has quit smoking. Her smoking use included Cigarettes. She has a 64.00 pack-year smoking history. She has never used smokeless tobacco. She reports that she drinks alcohol. She reports that she does not use drugs.  Counseling given: Not Answered      DPA/Advanced Directive:      ROS:    Gait: No assistive device  Ostomy: No  Other tubes: No  Amputations: No  Chronic oxygen use: No  Last eye exam:   Wears hearing aids: No  : No leakage    Screening:    Depression Screening    Little interest or pleasure in doing things?  0 - not at all  Feeling down, depressed , or hopeless? 0 - not at all  Patient Health Questionnaire Score: 0     If depressive symptoms identified deferred to follow up visit unless specifically addressed in assessment and plan.    Interpretation of PHQ-9 Total Score   Score Severity   1-4 No Depression   5-9 Mild Depression   10-14 Moderate Depression   15-19 Moderately Severe Depression   20-27 Severe  Depression    Screening for Cognitive Impairment    Three Minute Recall (leader, season, table) 3/3    Gonzalo clock face with all 12 numbers and set the hands to show 10 past 11.  Yes    Cognitive concerns identified deferred for follow up unless specifically addressed in assessment and plan.    Fall Risk Assessment    Has the patient had two or more falls in the last year or any fall with injury in the last year?  No    Safety Assessment    Throw rugs on floor.  No  Handrails on all stairs.  No  Good lighting in all hallways.  Yes  Difficulty hearing.  No  Patient counseled about all safety risks that were identified.    Functional Assessment ADLs    Are there any barriers preventing you from cooking for yourself or meeting nutritional needs?  No.    Are there any barriers preventing you from driving safely or obtaining transportation?  No.    Are there any barriers preventing you from using a telephone or calling for help?  No.    Are there any barriers preventing you from shopping?  No.    Are there any barriers preventing you from taking care of your own finances?  No.    Are there any barriers preventing you from managing your medications?  No.    Are there any barriers preventing you from showering, bathing or dressing yourself?  No.    Are you currently engaging in any exercise or physical activity?  No.     What is your perception of your health?  Good.      Health Maintenance Summary                Annual Wellness Visit Overdue 1945     IMM DTaP/Tdap/Td Vaccine Overdue 6/28/1966     IMM ZOSTER VACCINES Overdue 6/28/1997     IMM PNEUMOCOCCAL 65+ (ADULT) LOW/MEDIUM RISK SERIES Overdue 6/28/2012     COLON CANCER SCREENING ANNUAL FIT Next Due 11/12/2019      Done 11/12/2018 OCCULT BLOOD FECES IMMUNOASSAY    MAMMOGRAM Next Due 11/28/2019      Done 11/28/2018 MA-SCREENING MAMMO BILAT W/TOMOSYNTHESIS W/CAD    BONE DENSITY Next Due 11/28/2023      Done 11/28/2018 DS-BONE DENSITY STUDY (DEXA)          Patient  "Care Team:  Ponce Fung M.D. as PCP - General (Geriatrics)        Social History   Substance Use Topics   • Smoking status: Former Smoker     Packs/day: 2.00     Years: 32.00     Types: Cigarettes   • Smokeless tobacco: Never Used      Comment: Started at 20   • Alcohol use 0.0 oz/week      Comment: 1 beer or a glass of wine per month     Family History   Problem Relation Age of Onset   • Cancer Mother         Uterine/Lymphnode/Metastisized   • Heart Disease Sister    • Cancer Sister         Uterian   • Breast Cancer Maternal Aunt    • Cancer Sister         Skin   • Other Sister         something eating her from inside out/rotting skin   • Suicide Attempts Father    • No Known Problems Brother    • No Known Problems Brother    • Heart Disease Daughter         HO Congenital heart disease   • Other Daughter         epilepsy     She  has a past medical history of Breast lump. She also has no past medical history of Diabetes or Unspecified hemorrhagic conditions.   Past Surgical History:   Procedure Laterality Date   • ANKLE FUSION     • CHOLECYSTECTOMY         Exam:   Blood pressure 128/82, pulse 84, temperature 36.7 °C (98.1 °F), temperature source Temporal, resp. rate 16, height 1.6 m (5' 3\"), weight 119.4 kg (263 lb 3.7 oz), SpO2 94 %, not currently breastfeeding. Body mass index is 46.63 kg/m².    Hearing, good .    Dentition, good    Alert, oriented in no acute distress.  Eye contact is good, speech goal directed, affect calm    Assessment and Plan. The following treatment and monitoring plan is recommended:      1. Medicare annual wellness visit, initial  Preventive measures and chronic medical issues reviewed.    - Initial Annual Wellness Visit - Includes PPPS ()    2. Essential hypertension  Adequately controlled on diet.  Patient is counseled on low-salt diet, exercise.    - Initial Annual Wellness Visit - Includes PPPS ()    3. Mixed hyperlipidemia  Adequately controlled on diet.  Last lipid " panel showed no significant abnormality.    - Initial Annual Wellness Visit - Includes PPPS ()    4. Colon cancer screening  Due for colonoscopy.  Order placed.    - Initial Annual Wellness Visit - Includes PPPS ()  - REFERRAL TO GI FOR COLONOSCOPY    5. Morbid obesity with BMI of 45.0-49.9, adult (HCC)  BMI is 46.  Patient is counseled about lifestyle medication.  Continue follow-up with obesity medicine team.    Services suggested:   Health Care Screening: Age-appropriate preventive services recommended by USPTF and ACIP covered by Medicare were discussed today. Services ordered if indicated and agreed upon by the patient.  Referrals offered: Community-based lifestyle interventions to reduce health risks and promote self-management and wellness, fall prevention, nutrition, physical activity, tobacco-use cessation, weight loss, and mental health services as per orders if indicated.    Discussion today about general wellness and lifestyle habits:    · Prevent falls and reduce trip hazards; Cautioned about securing or removing rugs.  · Have a working fire alarm and carbon monoxide detector;   · Engage in regular physical activity and social activities     Follow-up: No Follow-up on file.
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
## 2019-02-19 ENCOUNTER — APPOINTMENT (OUTPATIENT)
Dept: HEALTH INFORMATION MANAGEMENT | Facility: MEDICAL CENTER | Age: 74
End: 2019-02-19
Payer: MEDICARE

## 2020-01-16 NOTE — PROGRESS NOTES
Outcome:Mailbox full pa introduction call     Please transfer to Patient Outreach Team at [phone] when patient returns call.    [facility] Verified: yes    Attempt # 1

## 2020-05-14 NOTE — PROGRESS NOTES
Outcome: Left Message scppa intro 2nd attempt  & aha awv Mailbox full     Please transfer to Patient Outreach Team at [phone] when patient returns call.      [facility] Verified: yes    Attempt # 2

## 2021-01-15 DIAGNOSIS — Z23 NEED FOR VACCINATION: ICD-10-CM

## 2021-03-12 ENCOUNTER — IMMUNIZATION (OUTPATIENT)
Dept: FAMILY PLANNING/WOMEN'S HEALTH CLINIC | Facility: IMMUNIZATION CENTER | Age: 76
End: 2021-03-12
Attending: INTERNAL MEDICINE
Payer: MEDICARE

## 2021-03-12 DIAGNOSIS — Z23 ENCOUNTER FOR VACCINATION: Primary | ICD-10-CM

## 2021-03-12 DIAGNOSIS — Z23 NEED FOR VACCINATION: ICD-10-CM

## 2021-03-12 PROCEDURE — 0011A MODERNA SARS-COV-2 VACCINE: CPT

## 2021-03-12 PROCEDURE — 91301 MODERNA SARS-COV-2 VACCINE: CPT

## 2021-04-10 ENCOUNTER — IMMUNIZATION (OUTPATIENT)
Dept: FAMILY PLANNING/WOMEN'S HEALTH CLINIC | Facility: IMMUNIZATION CENTER | Age: 76
End: 2021-04-10
Attending: INTERNAL MEDICINE
Payer: MEDICARE

## 2021-04-10 DIAGNOSIS — Z23 ENCOUNTER FOR VACCINATION: Primary | ICD-10-CM

## 2021-04-10 PROCEDURE — 91301 MODERNA SARS-COV-2 VACCINE: CPT

## 2021-04-10 PROCEDURE — 0012A MODERNA SARS-COV-2 VACCINE: CPT

## 2021-05-05 ENCOUNTER — PATIENT MESSAGE (OUTPATIENT)
Dept: HEALTH INFORMATION MANAGEMENT | Facility: OTHER | Age: 76
End: 2021-05-05

## 2021-06-02 ENCOUNTER — PATIENT OUTREACH (OUTPATIENT)
Dept: HEALTH INFORMATION MANAGEMENT | Facility: OTHER | Age: 76
End: 2021-06-02

## 2021-06-02 NOTE — PROGRESS NOTES
Outcome: Scheduled ELA    Please transfer to Patient Outreach Team at [phone] when patient returns call.    [employer] Checked & Epic Updated:  no    [facility] Verified: no    Attempt # 1

## 2021-06-22 PROBLEM — I77.9 DISORDER OF ARTERIES AND ARTERIOLES (HCC): Status: ACTIVE | Noted: 2021-06-22

## 2021-06-22 PROBLEM — R32 URINARY INCONTINENCE: Status: ACTIVE | Noted: 2021-06-22

## 2021-06-24 ENCOUNTER — OFFICE VISIT (OUTPATIENT)
Dept: MEDICAL GROUP | Facility: PHYSICIAN GROUP | Age: 76
End: 2021-06-24
Payer: MEDICARE

## 2021-06-24 VITALS
HEART RATE: 60 BPM | WEIGHT: 244 LBS | OXYGEN SATURATION: 97 % | BODY MASS INDEX: 43.23 KG/M2 | HEIGHT: 63 IN | RESPIRATION RATE: 16 BRPM | DIASTOLIC BLOOD PRESSURE: 80 MMHG | TEMPERATURE: 98.1 F | SYSTOLIC BLOOD PRESSURE: 128 MMHG

## 2021-06-24 DIAGNOSIS — E66.01 MORBID OBESITY WITH BMI OF 45.0-49.9, ADULT (HCC): ICD-10-CM

## 2021-06-24 DIAGNOSIS — E55.9 VITAMIN D DEFICIENCY: ICD-10-CM

## 2021-06-24 DIAGNOSIS — Z13.220 ENCOUNTER FOR LIPID SCREENING FOR CARDIOVASCULAR DISEASE: ICD-10-CM

## 2021-06-24 DIAGNOSIS — I48.0 PAROXYSMAL ATRIAL FIBRILLATION (HCC): ICD-10-CM

## 2021-06-24 DIAGNOSIS — Z12.31 ENCOUNTER FOR SCREENING MAMMOGRAM FOR BREAST CANCER: ICD-10-CM

## 2021-06-24 DIAGNOSIS — I10 ESSENTIAL HYPERTENSION: ICD-10-CM

## 2021-06-24 DIAGNOSIS — Z11.59 ENCOUNTER FOR HEPATITIS C SCREENING TEST FOR LOW RISK PATIENT: ICD-10-CM

## 2021-06-24 DIAGNOSIS — E78.2 MIXED HYPERLIPIDEMIA: ICD-10-CM

## 2021-06-24 DIAGNOSIS — E53.8 B12 DEFICIENCY: ICD-10-CM

## 2021-06-24 DIAGNOSIS — Z13.6 ENCOUNTER FOR LIPID SCREENING FOR CARDIOVASCULAR DISEASE: ICD-10-CM

## 2021-06-24 DIAGNOSIS — Z12.11 SCREENING FOR COLORECTAL CANCER: ICD-10-CM

## 2021-06-24 DIAGNOSIS — I77.9 DISORDER OF ARTERIES AND ARTERIOLES (HCC): ICD-10-CM

## 2021-06-24 DIAGNOSIS — Z12.12 SCREENING FOR COLORECTAL CANCER: ICD-10-CM

## 2021-06-24 PROBLEM — E88.810 METABOLIC SYNDROME: Status: RESOLVED | Noted: 2019-01-21 | Resolved: 2021-06-24

## 2021-06-24 PROCEDURE — 99215 OFFICE O/P EST HI 40 MIN: CPT | Performed by: INTERNAL MEDICINE

## 2021-06-24 ASSESSMENT — PATIENT HEALTH QUESTIONNAIRE - PHQ9: CLINICAL INTERPRETATION OF PHQ2 SCORE: 0

## 2021-06-24 NOTE — ASSESSMENT & PLAN NOTE
Recent problem, requires follow up. Recheck ABIs in the future after work up completed for atrial fibrillation.

## 2021-06-24 NOTE — ASSESSMENT & PLAN NOTE
Previous problem, currently stable.  Unclear if she truly met criteria for hypertension.  We will continue to monitor.  Diastolic blood pressure borderline today.

## 2021-06-24 NOTE — PROGRESS NOTES
Subjective:     CC:  Establish care    HISTORY OF THE PRESENT ILLNESS: Ruby Madgline Emmerling is a 75 y.o. female here today to establish primary medical care and discuss the below stated chronic medical conditions. Luda is unaccompanied for today's visit.    Problem   Paroxysmal Atrial Fibrillation (Hcc)    She presents to clinic to review concerns over new onset atrial fibrillation.  She has an apple smart watch and on 2 occasions has been alerted of irregular rhythm.  These events were on April 25 and Marilee 10.  Normally occurs while she is supine in bed resting.  Heart rate goes as high as the mid 140s.  She is symptomatic with feeling restless.  No personal history and no prior evaluation.  No recent lab work.  No personal history of hyperthyroidism.  She has history of morbid obesity, unclear if she is ever been evaluated for sleep apnea.  Former smoker.  She has moderate alcohol consumption.  She had a reassuring stress test in 2013 but no recent cardiac evaluation.  No other history of lung disease.     Disorder of Arteries and Arterioles (Hcc)    She had an abnormal screening test with right lower extremity 0.8 left lower extremity 0.83 on Quanta flow.  She is a former smoker of approximately 2 packs/day.  She has history of hyperlipidemia, hypertension, and morbid obesity.  No clear claudication at this time.     Mixed Hyperlipidemia       Ref. Range 2/7/2013 15:46 11/28/2018 07:02   Cholesterol,Tot Latest Ref Range: 100 - 199 mg/dL 168 158   Triglycerides Latest Ref Range: 0 - 149 mg/dL 95 123   HDL Latest Ref Range: >=40 mg/dL 43 42   LDL Latest Ref Range: <100 mg/dL 106 (H) 91     She has history of mild LDL elevation.  Is an abnormal EKG since 2013 with Q waves in anterior leads suggestive of prior anterior infarct.  Nuclear stress test in 2013 did not show any abnormalities to confirm this finding.  She is taking aspirin, no prior use of cholesterol-lowering medications.     Essential Hypertension     "She was diagnosed with elevated blood pressure in 2013.  She is using pharmacotherapy at this time.  Blood pressure in clinic has ranged 128-130/70-80.  No recent lab work completed.  EKG shows previous anterior infarct since 2013 with reassuring nuclear stress test completed at that time.     Morbid obesity with BMI of 45.0-49.9, adult ([facility])    She has elevated weight of 244 pounds with a BMI of 43.  She met with [facility] improvements program 1 time in the past at the recommendation of her PCP.  She was recommended to try weight loss medications but declined due to elevated cost.  She did not follow-up with their dietitian.           Current Medications:  Current Outpatient Medications Ordered in Epic   Medication Sig Dispense Refill   • Misc Natural Products (FOCUSED MIND PO) Take 2 Capsules by mouth every day.     • Multiple Vitamins-Minerals (ONE-A-DAY 50 PLUS PO) Take 1 capsule by mouth every day.     • SUPER B COMPLEX/C PO Take 1 capsule by mouth every day.     • Calcium Polycarbophil (FIBER) 625 MG Tab Take 1 tablet by mouth every day.     • COLLAGEN-ANTIMICROBIAL EX Apply 1 Packet topically every day.     • Ibuprofen (ADVIL PO) Take  by mouth.     • MAGNESIUM PO Take  by mouth.     • vitamin D (CHOLECALCIFEROL) 1000 UNIT Tab Take 1,000 Units by mouth every day.     • POTASSIUM PO Take  by mouth.     • ASPIRIN PO Take  by mouth.       No current Epic-ordered facility-administered medications on file.       PMH, PSH, Social History, Medications, Allergies, FMH updated and reviewed as documented:    Objective:   Physical Exam:    Vitals: /80 (BP Location: Left arm, Patient Position: Sitting, BP Cuff Size: Adult)   Pulse 60   Temp 36.7 °C (98.1 °F) (Temporal)   Resp 16   Ht 1.6 m (5' 3\")   Wt 111 kg (244 lb)   SpO2 97%    BMI: Body mass index is 43.22 kg/m².  Physical Exam  Constitutional:       General: She is not in acute distress.     Appearance: Normal appearance. She is obese. She is not " ill-appearing.   HENT:      Ears:      Comments: Moderate cerumen impaction bilaterally, canals and TM's normal appearing.  Eyes:      Conjunctiva/sclera: Conjunctivae normal.      Pupils: Pupils are equal, round, and reactive to light.   Cardiovascular:      Rate and Rhythm: Normal rate and regular rhythm.      Pulses: Normal pulses.      Heart sounds: No murmur heard.     Pulmonary:      Effort: Pulmonary effort is normal.      Breath sounds: Normal breath sounds. No wheezing or rhonchi.   Abdominal:      Palpations: Abdomen is soft.      Tenderness: There is no abdominal tenderness.   Skin:     General: Skin is warm and dry.      Findings: No rash.      Comments: Healing abrasion on left forearm   Psychiatric:         Mood and Affect: Mood normal.         Behavior: Behavior normal.         Thought Content: Thought content normal.         Judgment: Judgment normal.      Comments: Tearful when recounting recent family losses.          Assessment & Plan:   Luda is a 75 y.o. female with the following:  Problem List Items Addressed This Visit     Morbid obesity with BMI of 45.0-49.9, adult (HCC)     Chronic ongoing problem.  We will focus on atrial fibrillation and evaluation for obstructive sleep apnea.  Will discuss with patient whether she would want to pursue pharmacologic or surgical treatments to assist with weight loss moving forward.         Mixed hyperlipidemia     Previous problem, mild severity.  Will update levels to determine whether pharmacotherapy would be indicated.         Essential hypertension     Previous problem, currently stable.  Unclear if she truly met criteria for hypertension.  We will continue to monitor.  Diastolic blood pressure borderline today.         Disorder of arteries and arterioles (HCC)     Recent problem, requires follow up. Recheck ABIs in the future after work up completed for atrial fibrillation.         Paroxysmal atrial fibrillation (HCC)     New problem that requires  additional evaluation.  History consistent with paroxysmal atrial fibrillation.  Due to timing of events most suspicious would be obstructive sleep apnea.  We will start with overnight pulse oximetry.  EKG completed in clinic today demonstrates normal sinus rhythm with old anterior infarct (noted in 2013 with negative stress test at that time).  No recent lab work, will rule out anemia, hypomagnesemia, hypokalemia, and hyperthyroidism is contributing laboratory causes.  Had discussion over rate versus rhythm approach as well as indications for systemic anticoagulation.  Will calculate [id number] score once I have all of her information back.  Will complete a Zio patch to see if we can capture any subsequent events of atrial fibrillation.  Neck echocardiogram to assess for elevated RVSP or left atrial dilation.  May ultimately require electrophysiology consult but will start with pulmonary evaluation first.  We will have her return to clinic in 2 weeks to review above.         Relevant Orders    CBC WITH DIFFERENTIAL    Comp Metabolic Panel    TSH WITH REFLEX TO FT4    EC-ECHOCARDIOGRAM COMPLETE W/O CONT    RIH ZIO PATCH MONITOR    EKG - Clinic Performed    MAGNESIUM      Other Visit Diagnoses     Encounter for screening mammogram for breast cancer        Relevant Orders    MA-SCREENING MAMMO BILAT W/TOMOSYNTHESIS W/CAD    Screening for colorectal cancer        Relevant Orders    OCCULT BLOOD FECES IMMUNOASSAY (FIT)    Encounter for hepatitis C screening test for low risk patient        Relevant Orders    HEP C VIRUS ANTIBODY    Encounter for lipid screening for cardiovascular disease        Relevant Orders    Lipid Profile    Vitamin D deficiency        Relevant Orders    VITAMIN D,25 HYDROXY    B12 deficiency        Relevant Orders    VITAMIN B12           RTC: Return in about 2 weeks (around 7/8/2021).    I spent a total of 54 minutes with record review, exam, communication with the patient, communication with  other providers, and documentation of this encounter.    PLEASE NOTE: This dictation was created using voice recognition software. I have made every reasonable attempt to correct obvious errors, but I expect that there are errors of grammar and possibly content that I did not discover before finalizing the note.      Kenna Alamo, DO  Geriatric and Internal Medicine  [facility]

## 2021-06-24 NOTE — ASSESSMENT & PLAN NOTE
Previous problem, mild severity.  Will update levels to determine whether pharmacotherapy would be indicated.

## 2021-06-24 NOTE — ASSESSMENT & PLAN NOTE
Chronic ongoing problem.  We will focus on atrial fibrillation and evaluation for obstructive sleep apnea.  Will discuss with patient whether she would want to pursue pharmacologic or surgical treatments to assist with weight loss moving forward.

## 2021-06-24 NOTE — ASSESSMENT & PLAN NOTE
New problem that requires additional evaluation.  History consistent with paroxysmal atrial fibrillation.  Due to timing of events most suspicious would be obstructive sleep apnea.  We will start with overnight pulse oximetry.  EKG completed in clinic today demonstrates normal sinus rhythm with old anterior infarct (noted in 2013 with negative stress test at that time).  No recent lab work, will rule out anemia, hypomagnesemia, hypokalemia, and hyperthyroidism is contributing laboratory causes.  Had discussion over rate versus rhythm approach as well as indications for systemic anticoagulation.  Will calculate [id number] score once I have all of her information back.  Will complete a Zio patch to see if we can capture any subsequent events of atrial fibrillation.  Neck echocardiogram to assess for elevated RVSP or left atrial dilation.  May ultimately require electrophysiology consult but will start with pulmonary evaluation first.  We will have her return to clinic in 2 weeks to review above.

## 2021-06-25 ENCOUNTER — HOSPITAL ENCOUNTER (OUTPATIENT)
Facility: MEDICAL CENTER | Age: 76
End: 2021-06-25
Attending: INTERNAL MEDICINE
Payer: MEDICARE

## 2021-06-25 ENCOUNTER — NON-PROVIDER VISIT (OUTPATIENT)
Dept: MEDICAL GROUP | Facility: PHYSICIAN GROUP | Age: 76
End: 2021-06-25
Payer: MEDICARE

## 2021-06-25 DIAGNOSIS — E53.8 B12 DEFICIENCY: ICD-10-CM

## 2021-06-25 DIAGNOSIS — Z13.220 ENCOUNTER FOR LIPID SCREENING FOR CARDIOVASCULAR DISEASE: ICD-10-CM

## 2021-06-25 DIAGNOSIS — Z13.6 ENCOUNTER FOR LIPID SCREENING FOR CARDIOVASCULAR DISEASE: ICD-10-CM

## 2021-06-25 DIAGNOSIS — E55.9 VITAMIN D DEFICIENCY: ICD-10-CM

## 2021-06-25 DIAGNOSIS — Z11.59 ENCOUNTER FOR HEPATITIS C SCREENING TEST FOR LOW RISK PATIENT: ICD-10-CM

## 2021-06-25 DIAGNOSIS — I48.0 PAROXYSMAL ATRIAL FIBRILLATION (HCC): ICD-10-CM

## 2021-06-25 LAB
25(OH)D3 SERPL-MCNC: 37 NG/ML (ref 30–100)
ALBUMIN SERPL BCP-MCNC: 4.2 G/DL (ref 3.2–4.9)
ALBUMIN/GLOB SERPL: 1.8 G/DL
ALP SERPL-CCNC: 75 U/L (ref 30–99)
ALT SERPL-CCNC: 34 U/L (ref 2–50)
ANION GAP SERPL CALC-SCNC: 11 MMOL/L (ref 7–16)
AST SERPL-CCNC: 29 U/L (ref 12–45)
BASOPHILS # BLD AUTO: 0.4 % (ref 0–1.8)
BASOPHILS # BLD: 0.03 K/UL (ref 0–0.12)
BILIRUB SERPL-MCNC: 1.1 MG/DL (ref 0.1–1.5)
BUN SERPL-MCNC: 19 MG/DL (ref 8–22)
CALCIUM SERPL-MCNC: 8.8 MG/DL (ref 8.5–10.5)
CHLORIDE SERPL-SCNC: 111 MMOL/L (ref 96–112)
CHOLEST SERPL-MCNC: 154 MG/DL (ref 100–199)
CO2 SERPL-SCNC: 23 MMOL/L (ref 20–33)
CREAT SERPL-MCNC: 0.8 MG/DL (ref 0.5–1.4)
EOSINOPHIL # BLD AUTO: 0.11 K/UL (ref 0–0.51)
EOSINOPHIL NFR BLD: 1.5 % (ref 0–6.9)
ERYTHROCYTE [DISTWIDTH] IN BLOOD BY AUTOMATED COUNT: 50.2 FL (ref 35.9–50)
GLOBULIN SER CALC-MCNC: 2.4 G/DL (ref 1.9–3.5)
GLUCOSE SERPL-MCNC: 97 MG/DL (ref 65–99)
HCT VFR BLD AUTO: 42.6 % (ref 37–47)
HCV AB SER QL: NORMAL
HDLC SERPL-MCNC: 38 MG/DL
HGB BLD-MCNC: 13.3 G/DL (ref 12–16)
IMM GRANULOCYTES # BLD AUTO: 0.03 K/UL (ref 0–0.11)
IMM GRANULOCYTES NFR BLD AUTO: 0.4 % (ref 0–0.9)
LDLC SERPL CALC-MCNC: 96 MG/DL
LYMPHOCYTES # BLD AUTO: 1.67 K/UL (ref 1–4.8)
LYMPHOCYTES NFR BLD: 22.6 % (ref 22–41)
MAGNESIUM SERPL-MCNC: 2.2 MG/DL (ref 1.5–2.5)
MCH RBC QN AUTO: 29.1 PG (ref 27–33)
MCHC RBC AUTO-ENTMCNC: 31.2 G/DL (ref 33.6–35)
MCV RBC AUTO: 93.2 FL (ref 81.4–97.8)
MONOCYTES # BLD AUTO: 0.41 K/UL (ref 0–0.85)
MONOCYTES NFR BLD AUTO: 5.5 % (ref 0–13.4)
NEUTROPHILS # BLD AUTO: 5.15 K/UL (ref 2–7.15)
NEUTROPHILS NFR BLD: 69.6 % (ref 44–72)
NRBC # BLD AUTO: 0 K/UL
NRBC BLD-RTO: 0 /100 WBC
PLATELET # BLD AUTO: 292 K/UL (ref 164–446)
PMV BLD AUTO: 10.2 FL (ref 9–12.9)
POTASSIUM SERPL-SCNC: 4.5 MMOL/L (ref 3.6–5.5)
PROT SERPL-MCNC: 6.6 G/DL (ref 6–8.2)
RBC # BLD AUTO: 4.57 M/UL (ref 4.2–5.4)
SODIUM SERPL-SCNC: 145 MMOL/L (ref 135–145)
TRIGL SERPL-MCNC: 98 MG/DL (ref 0–149)
TSH SERPL DL<=0.005 MIU/L-ACNC: 2.81 UIU/ML (ref 0.38–5.33)
VIT B12 SERPL-MCNC: 1083 PG/ML (ref 211–911)
WBC # BLD AUTO: 7.4 K/UL (ref 4.8–10.8)

## 2021-06-25 PROCEDURE — 86803 HEPATITIS C AB TEST: CPT

## 2021-06-25 PROCEDURE — 85025 COMPLETE CBC W/AUTO DIFF WBC: CPT

## 2021-06-25 PROCEDURE — 36415 COLL VENOUS BLD VENIPUNCTURE: CPT | Performed by: INTERNAL MEDICINE

## 2021-06-25 PROCEDURE — 82306 VITAMIN D 25 HYDROXY: CPT

## 2021-06-25 PROCEDURE — 82607 VITAMIN B-12: CPT

## 2021-06-25 PROCEDURE — 83735 ASSAY OF MAGNESIUM: CPT

## 2021-06-25 PROCEDURE — 84443 ASSAY THYROID STIM HORMONE: CPT

## 2021-06-25 PROCEDURE — 80061 LIPID PANEL: CPT

## 2021-06-25 PROCEDURE — 80053 COMPREHEN METABOLIC PANEL: CPT

## 2021-06-25 NOTE — PROGRESS NOTES
Pt was seated, confirmed pt name and , procedure explained to pt, venipuncture performed in LAC. using aseptic technique, 5 Gold, 1 Green, 1 Lavender tubes collected, gauze placed with pressure on venipuncture site until hemostasis observed, site clean and dry, no redness or swelling observed, bandage placed, pt tolerated well and voiced no concerns.

## 2021-07-02 ENCOUNTER — HOSPITAL ENCOUNTER (OUTPATIENT)
Dept: RADIOLOGY | Facility: MEDICAL CENTER | Age: 76
End: 2021-07-02
Attending: INTERNAL MEDICINE
Payer: MEDICARE

## 2021-07-02 DIAGNOSIS — Z12.31 ENCOUNTER FOR SCREENING MAMMOGRAM FOR BREAST CANCER: ICD-10-CM

## 2021-07-02 PROCEDURE — 77063 BREAST TOMOSYNTHESIS BI: CPT

## 2021-07-06 ENCOUNTER — HOSPITAL ENCOUNTER (OUTPATIENT)
Facility: MEDICAL CENTER | Age: 76
End: 2021-07-06
Attending: INTERNAL MEDICINE
Payer: MEDICARE

## 2021-07-06 PROCEDURE — 82274 ASSAY TEST FOR BLOOD FECAL: CPT

## 2021-07-08 ENCOUNTER — OFFICE VISIT (OUTPATIENT)
Dept: MEDICAL GROUP | Facility: PHYSICIAN GROUP | Age: 76
End: 2021-07-08
Payer: MEDICARE

## 2021-07-08 ENCOUNTER — TELEPHONE (OUTPATIENT)
Dept: MEDICAL GROUP | Facility: PHYSICIAN GROUP | Age: 76
End: 2021-07-08

## 2021-07-08 VITALS
WEIGHT: 239.2 LBS | SYSTOLIC BLOOD PRESSURE: 132 MMHG | BODY MASS INDEX: 42.38 KG/M2 | RESPIRATION RATE: 18 BRPM | DIASTOLIC BLOOD PRESSURE: 62 MMHG | HEART RATE: 59 BPM | OXYGEN SATURATION: 96 % | HEIGHT: 63 IN | TEMPERATURE: 98.1 F

## 2021-07-08 DIAGNOSIS — Z23 NEED FOR DIPHTHERIA-TETANUS-PERTUSSIS (TDAP) VACCINE: ICD-10-CM

## 2021-07-08 DIAGNOSIS — E78.6 LOW HDL (UNDER 40): ICD-10-CM

## 2021-07-08 DIAGNOSIS — Z23 NEED FOR SHINGLES VACCINE: ICD-10-CM

## 2021-07-08 DIAGNOSIS — I48.0 PAROXYSMAL ATRIAL FIBRILLATION (HCC): ICD-10-CM

## 2021-07-08 DIAGNOSIS — I10 ESSENTIAL HYPERTENSION: ICD-10-CM

## 2021-07-08 PROCEDURE — 90750 HZV VACC RECOMBINANT IM: CPT | Performed by: INTERNAL MEDICINE

## 2021-07-08 PROCEDURE — 90472 IMMUNIZATION ADMIN EACH ADD: CPT | Performed by: INTERNAL MEDICINE

## 2021-07-08 PROCEDURE — 90471 IMMUNIZATION ADMIN: CPT | Performed by: INTERNAL MEDICINE

## 2021-07-08 PROCEDURE — 90715 TDAP VACCINE 7 YRS/> IM: CPT | Performed by: INTERNAL MEDICINE

## 2021-07-08 PROCEDURE — 99214 OFFICE O/P EST MOD 30 MIN: CPT | Mod: 25 | Performed by: INTERNAL MEDICINE

## 2021-07-08 ASSESSMENT — FIBROSIS 4 INDEX: FIB4 SCORE: 1.26

## 2021-07-08 NOTE — PROGRESS NOTES
Subjective:   Chief Complaint/History of Present Illness:  Ruby Madgline Emmerling is a 76 y.o. female established patient who presents today to discuss medical problems as listed below. Luda is accompanied by her daughter, Courtney.    Problem   Paroxysmal Atrial Fibrillation (Hcc)    She presents to clinic to review concerns over new onset atrial fibrillation.  She has an apple smart watch and on 2 occasions has been alerted of irregular rhythm.  These events were on April 25 and Marilee 10.  Normally occurs while she is supine in bed resting.  Heart rate goes as high as the mid 140s.  She is symptomatic with feeling restless.  No personal history and no prior evaluation.     She has history of morbid obesity, unclear if she is ever been evaluated for sleep apnea.  Former smoker.  She has moderate alcohol consumption.  She had a reassuring stress test in 2013 but no recent cardiac evaluation.  No other history of lung disease. Lab work negative for thyroid disease, anemia, or electrolyte abnormality.     Low Hdl (Under 40)       Ref. Range 6/25/2021 08:25   Cholesterol,Tot Latest Ref Range: 100 - 199 mg/dL 154   Triglycerides Latest Ref Range: 0 - 149 mg/dL 98   HDL Latest Ref Range: >=40 mg/dL 38 (A)   LDL Latest Ref Range: <100 mg/dL 96     She has history of mild LDL elevation.  She had an abnormal EKG since 2013 with Q waves in anterior leads suggestive of prior anterior infarct.  Nuclear stress test in 2013 did not show any abnormalities to confirm this finding.  She is taking aspirin, no prior use of cholesterol-lowering medications.     Essential Hypertension    She was diagnosed with elevated blood pressure in 2013.  She is not using pharmacotherapy at this time.  Blood pressure in clinic has ranged 128-142/64-80.     EKG shows previous anterior infarct since 2013 with reassuring nuclear stress test completed at that time.    She is working hard to try and lose weight, already down 5 lb since our last visit.       "    Current Medications:  Current Outpatient Medications Ordered in Epic   Medication Sig Dispense Refill   • Misc Natural Products (FOCUSED MIND PO) Take 2 Capsules by mouth every day.     • Multiple Vitamins-Minerals (ONE-A-DAY 50 PLUS PO) Take 1 capsule by mouth every day.     • SUPER B COMPLEX/C PO Take 1 capsule by mouth every day.     • Calcium Polycarbophil (FIBER) 625 MG Tab Take 1 tablet by mouth every day.     • COLLAGEN-ANTIMICROBIAL EX Apply 1 Packet topically every day.     • MAGNESIUM PO Take  by mouth.     • vitamin D (CHOLECALCIFEROL) 1000 UNIT Tab Take 1,000 Units by mouth every day.     • POTASSIUM PO Take  by mouth.       No current Epic-ordered facility-administered medications on file.          Objective:   Physical Exam:    Vitals: /62 (BP Location: Right arm, Patient Position: Sitting, BP Cuff Size: Large adult)   Pulse (!) 59   Temp 36.7 °C (98.1 °F) (Temporal)   Resp 18   Ht 1.6 m (5' 3\")   Wt 109 kg (239 lb 3.2 oz)   SpO2 96%    BMI: Body mass index is 42.37 kg/m².  Physical Exam  Constitutional:       General: She is not in acute distress.     Appearance: She is not ill-appearing.   Eyes:      Extraocular Movements: Extraocular movements intact.      Conjunctiva/sclera: Conjunctivae normal.   Cardiovascular:      Rate and Rhythm: Normal rate and regular rhythm.      Pulses: Normal pulses.      Heart sounds: No murmur heard.     Pulmonary:      Effort: Pulmonary effort is normal. No respiratory distress.      Breath sounds: Normal breath sounds. No wheezing.   Skin:     General: Skin is warm and dry.      Findings: No rash.   Neurological:      Gait: Gait normal.   Psychiatric:         Mood and Affect: Mood normal.         Behavior: Behavior normal.         Thought Content: Thought content normal.         Judgment: Judgment normal.          Assessment and Plan:   Luda is a 76 y.o. female with the following:  Problem List Items Addressed This Visit     Low HDL (under 40)     " Chronic and ongoing problem, LDL is below 100 but > 70. She is working to lose weight and would like to try lifestyle changes before adding more medicine. Will have her follow up again in 1 month.         Essential hypertension     Chronic and ongoing problem. She would like to try to lose additional weight and use a lifestyle approach before adding any medications. I think this is reasonable. Her blood work is all stable and was reviewed in detail. Echocardiogram ordered, will help to assess for LVH.         Paroxysmal atrial fibrillation (HCC)     Chronic and ongoing problem. Rate controlled at this time. She is doing well on losing weight and is already down 5 lb since I last saw her. Will complete additional evaluation including screening KOFI and checking echocardiogram. Recommended anticoagulation but due to high cost she declines at this time. Discussed Eliquis 5 mg twice daily. She will think about it and let me know. No indication for rate or rhythm medications at this time.           Other Visit Diagnoses     Need for diphtheria-tetanus-pertussis (Tdap) vaccine        Relevant Orders    Tdap =>6yo IM (Completed)    Need for shingles vaccine        Relevant Orders    Shingles Vaccine (Shingrix) (Completed)             RTC: Return in about 1 month (around 8/8/2021).    I spent a total of 39 minutes with record review, exam, communication with the patient, communication with other providers, and documentation of this encounter.    PLEASE NOTE: This dictation was created using voice recognition software. I have made every reasonable attempt to correct obvious errors, but I expect that there are errors of grammar and possibly content that I did not discover before finalizing the note.      Kenna Alamo, DO  Geriatric and Internal Medicine  [facility]

## 2021-07-08 NOTE — ASSESSMENT & PLAN NOTE
Chronic and ongoing problem, LDL is below 100 but > 70. She is working to lose weight and would like to try lifestyle changes before adding more medicine. Will have her follow up again in 1 month.

## 2021-07-08 NOTE — ASSESSMENT & PLAN NOTE
Chronic and ongoing problem. Rate controlled at this time. She is doing well on losing weight and is already down 5 lb since I last saw her. Will complete additional evaluation including screening KOFI and checking echocardiogram. Recommended anticoagulation but due to high cost she declines at this time. Discussed Eliquis 5 mg twice daily. She will think about it and let me know. No indication for rate or rhythm medications at this time.

## 2021-07-08 NOTE — ASSESSMENT & PLAN NOTE
Chronic and ongoing problem. She would like to try to lose additional weight and use a lifestyle approach before adding any medications. I think this is reasonable. Her blood work is all stable and was reviewed in detail. Echocardiogram ordered, will help to assess for LVH.

## 2021-07-08 NOTE — TELEPHONE ENCOUNTER
Phone Number Called: [phone] (home)       Call outcome: Spoke to patient regarding message below.    Message: Informed pt that her insurance will not kick in for a Eliquis Rx until she has paid a fee of $470 in order for deductible to kick in. Informed pt that once deductible kicks in, the cost will go down to about $47/month. Pt stated that she is not interested at this time due to the hefty cost, but she will call the pharmacy to see if there is anything she can do to get a lower price for the Rx and she will call back to notify us how she will proceed further.

## 2021-07-11 DIAGNOSIS — Z12.11 SCREENING FOR COLORECTAL CANCER: ICD-10-CM

## 2021-07-11 DIAGNOSIS — Z12.12 SCREENING FOR COLORECTAL CANCER: ICD-10-CM

## 2021-07-13 LAB — IMM ASSAY OCC BLD FITOB: NEGATIVE

## 2021-07-15 DIAGNOSIS — R79.81 ABNORMAL PULSE OXIMETRY: ICD-10-CM

## 2021-07-15 DIAGNOSIS — G47.34 NOCTURNAL HYPOXIA: ICD-10-CM

## 2021-07-19 DIAGNOSIS — I48.0 PAROXYSMAL ATRIAL FIBRILLATION (HCC): ICD-10-CM

## 2021-08-12 ENCOUNTER — SLEEP CENTER VISIT (OUTPATIENT)
Dept: SLEEP MEDICINE | Facility: MEDICAL CENTER | Age: 76
End: 2021-08-12
Payer: MEDICARE

## 2021-08-12 VITALS
HEIGHT: 63 IN | BODY MASS INDEX: 41.99 KG/M2 | DIASTOLIC BLOOD PRESSURE: 84 MMHG | WEIGHT: 237 LBS | RESPIRATION RATE: 16 BRPM | HEART RATE: 63 BPM | SYSTOLIC BLOOD PRESSURE: 124 MMHG | OXYGEN SATURATION: 96 %

## 2021-08-12 DIAGNOSIS — I48.0 PAROXYSMAL ATRIAL FIBRILLATION (HCC): ICD-10-CM

## 2021-08-12 DIAGNOSIS — G47.33 OSA (OBSTRUCTIVE SLEEP APNEA): ICD-10-CM

## 2021-08-12 PROCEDURE — 99203 OFFICE O/P NEW LOW 30 MIN: CPT | Performed by: FAMILY MEDICINE

## 2021-08-12 ASSESSMENT — FIBROSIS 4 INDEX: FIB4 SCORE: 1.26

## 2021-08-12 NOTE — PROGRESS NOTES
"     [facility]  Consult Note     Date: 8/12/2021 / Time: 1:34 PM    Patient ID:   Name:             Emmerling , Ruby Madgline   YOB: 1945  Age:                 76 y.o.  female   MRN:               [id number]      Thank you for requesting a sleep medicine consultation on Ruby Emmerling at the sleep center. She presents today with the chief complaints of snoring and non restful sleep. She is referred by Dr Alamo for evaluation and treatment of sleep disorder breathing.     HISTORY OF PRESENT ILLNESS:       At night,  Ruby Emmerling goes to bed around 7 pm and 9-10 pm on weekdays and on the weekends. She gets out of bed at 3:30 am on weekdays and 6:30 am on the weekends.  She averages about 7-8 hrs of sleep on a good night  She falls asleep \"right away\". She wakes up about 1-2 times during the night due to bathroom use and on average It takes her few min to fall back asleep. She is aware of snoring but unsure about breathing pauses/gasping or choking in sleep.  She  denies any symptoms of restless legs syndrome such as an \"urge to move\"  She  legs in the evening or bedtime. She  denies any symptoms of narcolepsy such as sleep paralysis or cataplexy, or any symptoms to suggest parasomnias such as sleep walking or acting out of dreams. She  has not used any medications for the sleep problem.Overall,she does not finds her sleep refreshing. In terms of  excessive daytime sleepiness,  She complains of sleepiness while  at work, while watching TV however denies while driving.She does not take regular naps.She drinks about 1 caffeinated beverages per day.    Her other comorbid conditions include hypertension, chronic fatigue and atrial fibrillation. The study was done on room air. The total analyzed time was 8 hrs 16 min. O2 Sat. loretta was 78% and mean O2 sat was 90% and baseline O2 at 98%. O2 sat was below 88% for 68 min of the flow evaluation time. Oxygen Desaturation (>=3%) Index was " elevated at 21/hr.        REVIEW OF SYSTEMS:       Constitutional: Denies fevers, Denies weight changes  Eyes: Denies changes in vision, no eye pain  Ears/Nose/Throat/Mouth: Denies nasal congestion or sore throat   Cardiovascular: Denies chest pain or palpitations   Respiratory: Denies shortness of breath , Denies cough  Gastrointestinal/Hepatic: Denies abdominal pain, nausea  Musculoskeletal/Rheum: Denies  joint pain and swelling   Skin/Breast: Denies rash  Neurological: Denies headache, confusion, memory loss or focal weakness/parasthesias  Psychiatric: denies mood disorder     Comprehensive review of systems form is reviewed with the patient and is attached in the EMR.     PMH:  has a past medical history of Breast lump, Chickenpox, and Citizen of [place] measles. She also has no past medical history of Diabetes or Unspecified hemorrhagic conditions.  MEDS:   Current Outpatient Medications:   •  Misc Natural Products (FOCUSED MIND PO), Take 2 Capsules by mouth every day., Disp: , Rfl:   •  Multiple Vitamins-Minerals (ONE-A-DAY 50 PLUS PO), Take 1 capsule by mouth every day., Disp: , Rfl:   •  SUPER B COMPLEX/C PO, Take 1 capsule by mouth every day., Disp: , Rfl:   •  Calcium Polycarbophil (FIBER) 625 MG Tab, Take 1 tablet by mouth every day., Disp: , Rfl:   •  COLLAGEN-ANTIMICROBIAL EX, Apply 1 Packet topically every day., Disp: , Rfl:   •  MAGNESIUM PO, Take  by mouth., Disp: , Rfl:   •  vitamin D (CHOLECALCIFEROL) 1000 UNIT Tab, Take 1,000 Units by mouth every day., Disp: , Rfl:   •  POTASSIUM PO, Take  by mouth., Disp: , Rfl:   •  apixaban (ELIQUIS) 5mg Tab, Take 1 tablet by mouth 2 times a day. (Patient not taking: Reported on 8/12/2021), Disp: 60 tablet, Rfl: 11  ALLERGIES:   Allergies   Allergen Reactions   • Demerol Vomiting     SURGHX:   Past Surgical History:   Procedure Laterality Date   • ANKLE FUSION     • CHOLECYSTECTOMY       SOCHX:  reports that she quit smoking about 23 years ago. Her smoking use included  "cigarettes. She has a 64.00 pack-year smoking history. She has never used smokeless tobacco. She reports current alcohol use. She reports that she does not use drugs.   FH:   Family History   Problem Relation Age of Onset   • Cancer Mother         Uterine/Lymphnode/Metastisized   • Heart Disease Sister    • Cancer Sister         Uterian   • Breast Cancer Maternal Aunt    • Cancer Sister         Skin   • Other Sister         something eating her from inside out/rotting skin   • Suicide Attempts Father    • No Known Problems Brother    • No Known Problems Brother    • Heart Disease Daughter         HO Congenital heart disease   • Other Daughter         epilepsy       Physical Exam:  Vitals/ General Appearance:   Weight/BMI: Body mass index is 41.98 kg/m².  /84 (BP Location: Left arm, Patient Position: Sitting, BP Cuff Size: Adult)   Pulse 63   Resp 16   Ht 1.6 m (5' 3\")   Wt 108 kg (237 lb)   SpO2 96%   Vitals:    08/12/21 1323   BP: 124/84   BP Location: Left arm   Patient Position: Sitting   BP Cuff Size: Adult   Pulse: 63   Resp: 16   SpO2: 96%   Weight: 108 kg (237 lb)   Height: 1.6 m (5' 3\")           Constitutional: Alert, no distress, well-groomed.  Skin: No rashes in visible areas.  Eye: Round. Conjunctiva clear, lids normal. No icterus.   ENMT: Lips pink without lesions, good dentition, moist mucous membranes. Phonation normal.  Neck: No masses, no thyromegaly. Moves freely without pain.  CV: Pulse as reported by patient  Respiratory: Unlabored respiratory effort, no cough or audible wheeze  Psych: Alert and oriented x3, normal affect and mood.   INVESTIGATIONS:       ASSESSMENT AND PLAN     1. She  has symptoms of Obstructive Sleep Apnea (KOFI). The pathophysiology of KOFI and the increased risk of cardiovascular morbidity from untreated KOFI is discussed in detail with the patient. She  also has HTN and Afib which can be worsened by her KOFI.     We have discussed diagnostic options including " in-laboratory, attended polysomnography and home sleep testing. We have also discussed treatment options including airway pressurization, reconstructive otolaryngologic surgery, dental appliances and weight management.       Subsequently,treatment options will be discussed based on the diagnostic study. Meanwhile, She is urged to avoid supine sleep, weight gain and alcoholic beverages since all of these can worsen KOFI. She is cautioned against drowsy driving. If She feels sleepy while driving, She must pull over for a break/nap, rather than persist on the road, in the interest of She own safety and that of others on the road.    Plan  -  She  will be scheduled for an overnight PSG to assess sleep related  breathing disorder.   -Overnight pulse oximetry was reviewed with the patient    2.Regarding treatment of other past medical problems and general health maintenance,  She is urged to follow up with PCP.

## 2021-09-16 ENCOUNTER — HOSPITAL ENCOUNTER (OUTPATIENT)
Dept: CARDIOLOGY | Facility: MEDICAL CENTER | Age: 76
End: 2021-09-16
Attending: INTERNAL MEDICINE
Payer: MEDICARE

## 2021-09-16 DIAGNOSIS — I48.0 PAROXYSMAL ATRIAL FIBRILLATION (HCC): ICD-10-CM

## 2021-09-16 LAB
LV EJECT FRACT MOD 2C 99903: 70.3
LV EJECT FRACT MOD 4C 99902: 78.33
LV EJECT FRACT MOD BP 99901: 73.71

## 2021-09-16 PROCEDURE — 93306 TTE W/DOPPLER COMPLETE: CPT | Mod: 26 | Performed by: INTERNAL MEDICINE

## 2021-09-16 PROCEDURE — 93306 TTE W/DOPPLER COMPLETE: CPT

## 2021-09-17 ENCOUNTER — SLEEP STUDY (OUTPATIENT)
Dept: SLEEP MEDICINE | Facility: MEDICAL CENTER | Age: 76
End: 2021-09-17
Payer: MEDICARE

## 2021-09-17 DIAGNOSIS — G47.33 OSA (OBSTRUCTIVE SLEEP APNEA): ICD-10-CM

## 2021-09-17 DIAGNOSIS — I48.0 PAROXYSMAL ATRIAL FIBRILLATION (HCC): ICD-10-CM

## 2021-09-23 ENCOUNTER — OFFICE VISIT (OUTPATIENT)
Dept: MEDICAL GROUP | Facility: PHYSICIAN GROUP | Age: 76
End: 2021-09-23
Payer: MEDICARE

## 2021-09-23 VITALS
DIASTOLIC BLOOD PRESSURE: 64 MMHG | WEIGHT: 234.3 LBS | HEART RATE: 55 BPM | SYSTOLIC BLOOD PRESSURE: 128 MMHG | OXYGEN SATURATION: 98 % | TEMPERATURE: 97.2 F | BODY MASS INDEX: 41.52 KG/M2 | RESPIRATION RATE: 12 BRPM | HEIGHT: 63 IN

## 2021-09-23 DIAGNOSIS — D68.69 SECONDARY HYPERCOAGULABLE STATE (HCC): ICD-10-CM

## 2021-09-23 DIAGNOSIS — Z23 NEED FOR VACCINATION: ICD-10-CM

## 2021-09-23 DIAGNOSIS — I48.0 PAROXYSMAL ATRIAL FIBRILLATION (HCC): ICD-10-CM

## 2021-09-23 DIAGNOSIS — E66.01 MORBID OBESITY WITH BMI OF 40.0-44.9, ADULT (HCC): ICD-10-CM

## 2021-09-23 PROCEDURE — 90662 IIV NO PRSV INCREASED AG IM: CPT | Performed by: INTERNAL MEDICINE

## 2021-09-23 PROCEDURE — G0008 ADMIN INFLUENZA VIRUS VAC: HCPCS | Performed by: INTERNAL MEDICINE

## 2021-09-23 PROCEDURE — 99214 OFFICE O/P EST MOD 30 MIN: CPT | Mod: 25 | Performed by: INTERNAL MEDICINE

## 2021-09-23 PROCEDURE — 90472 IMMUNIZATION ADMIN EACH ADD: CPT | Performed by: INTERNAL MEDICINE

## 2021-09-23 PROCEDURE — 90750 HZV VACC RECOMBINANT IM: CPT | Performed by: INTERNAL MEDICINE

## 2021-09-23 ASSESSMENT — FIBROSIS 4 INDEX: FIB4 SCORE: 1.26

## 2021-09-23 NOTE — LETTER
September 23, 2021        Ruby Madgline Emmerling  [facility] [address] [facility] [place] [address]      To Whom it may concern,      Luda was seen in our office today for a medical examination.      If you have any questions or concerns, please don't hesitate to call.        Sincerely,        Kenna Alamo D.O.

## 2021-09-23 NOTE — ASSESSMENT & PLAN NOTE
Chronic and ongoing problem.  We will have her follow-up after she has part D coverage and can discuss addition of anticoagulation at that time.  Discussed with her that when she is 77 she will have a [id number] risk score of 3.  She is agreeable to starting it at that time.  And

## 2021-09-23 NOTE — ASSESSMENT & PLAN NOTE
Chronic and ongoing problem.  No rate or rhythm control at this time.  Occurs while she is sleeping.  Working to lose weight and evaluation for obstructive sleep apnea underway.  Suspect it will be under better control once the sleep apnea is treated.  We will plan to add systemic anticoagulation in January when she has better part D coverage.

## 2021-09-23 NOTE — PROCEDURES
MONTAGE: Standard    STUDY TYPE: Diagnostic    RECORDING TECHNIQUE:   After the scalp was prepared, gold plated electrodes were applied to the scalp according to the International 10-20 System. EEG (electroencephalogram) was continuously monitored from the O1-M2, O2-M1, C3-M2, C4-M1, F3-M2, and F4-M1. EOGs (electrooculograms) were monitored by electrodes placed at the left and right outer canthi. Chin EMG (electromyogram) was monitored by electrodes placed on the mentalis and sub-mentalis muscles. Nasal and oral airflow were monitored using a triple port thermocouple as well as oronasal pressure transducer. Respiratory effort was measured by inductive plethysmography technology employing abdominal and thoracic belts. Blood oxygen saturation and pulse were monitored by pulse oximetry. Heart rhythm was monitored by surface electrocardiogram. Leg EMG was studied using surface electrodes placed on left and right anterior tibialis. A microphone was used to monitor tracheal sounds and snoring. Body position was monitored and documented by technician observation.     SCORING CRITERIA:   A modification of the AASM manual for scoring of sleep and associated events was used. Obstructive apneas were scored by cessation of airflow for at least 10 seconds with continuing respiratory effort. Central apneas were scored by cessation of airflow for at least 10 seconds with no respiratory effort. Hypopneas were scored by a 30% or more reduction in airflow for at least 10 seconds accompanied by arterial oxygen desaturation of 3% or an arousal. For CMS (Medicare) patients, per AASM rule 1B, hypopneas are scored by 30% with mild reduction in airflow for at least 10 seconds accompanied by arterial saturation decreased at 4%.    Study start time was 09:30:51 PM. Diagnostic recording time was 8h 38.5m with a total sleep time of 6h 48.0m resulting in a sleep efficiency of 78.69%%. Sleep latency from the start of the study was 11 minutes and  the latency from sleep to REM was 89 minutes. In total,71 arousals were scored for an arousal index of 10.4.    Respiratory:  There were a total of 12 apneas consisting of 12 obstructive apneas, 0 mixed apneas, and 0 central apneas. A total of 43 hypopneas were scored. The apnea index was 1.76 per hour and the hypopnea index was 6.32 per hour resulting in an overall AHI of 8.09. AHI during rem was 30.7 and AHI while supine was 8.09.    Oximetry:  There was a mean oxygen saturation of 92.0% with a minimum oxygen saturation of 78.0%. Time spent during sleep with oxygen saturations below 89% was 22.0 minutes. The patient spent 0.5 minutes of TST with SaO2 <88%.    Cardiac:  The highest heart rate seen while awake was 85 BPM while the highest heart rate during sleep was 84 BPM with an average sleeping heart rate of 62 BPM.    Limb Movements:  There were a total of 197 PLMs during sleep, of which 8 were PLMS arousals. This resulted in a PLMS index of 29.0 and a PLMS arousal index of 1.2.     Impression:  1.  Mild OAS with AHI of 8.1/hr and O2 loretta 78 %        Recommendations:  I recommend that the patient should return for a CPAP/BiPAP titration or trial of Auto CPAP. In some cases alternative treatment options may be proven effective in resolving sleep apnea. These options include upper airway surgery, the use of a dental orthotic, weight loss orpositional therapy. Clinical correlation is required. In general patients with sleep apnea are advised to avoid alcohol, sedatives and not to operate a motor vehicle while drowsy.  Untreated sleep apnea increases the risk for cardiovascular and neurovascular disease.

## 2021-09-23 NOTE — PROGRESS NOTES
Subjective:   Chief Complaint/History of Present Illness:  Ruby Madgline Emmerling is a 76 y.o. female established patient who presents today to discuss medical problems as listed below. Luda is accompanied by her daughter, Ximena.    Problem   Secondary Hypercoagulable State (Hcc)    She has paroxysmal atrial fibrillation.  Recommended to go on systemic anticoagulation however she is happening pretty coverages not affordable.  She will plan to have coverage as of January 2022.  She continues on aspirin 81 mg in the meantime.    Initial [id number] score was 4 (history of MI, hypertension, age, gender) however stress test and echocardiogram did not show any evidence of prior MI and her blood pressure is controlled without pharmacotherapy so at this moment is closer to 2.     Paroxysmal Atrial Fibrillation (Hcc)    She presents to clinic to review concerns over new onset atrial fibrillation.  She has an apple smart watch and on 2 occasions has been alerted of irregular rhythm.  These events were on April 25 and Marilee 10 and again in September while she was sleeping.  Normally occurs while she is supine in bed resting.  Heart rate goes as high as the mid 140s.  She is symptomatic with feeling restless.  No personal history and no prior evaluation.     She has history of morbid obesity, evaluation for sleep apnea underway.  Former smoker.  She has moderate alcohol consumption.  She had a reassuring stress test in 2013 but no recent cardiac evaluation.  No other history of lung disease. Lab work negative for thyroid disease, anemia, or electrolyte abnormality.     Morbid obesity with BMI of 40.0-44.9, adult (HCC)    She has elevated weight of 234 pounds with a BMI of 41.5, this is down from 244 pounds with a BMI of 43 in June 2021.  She was recommended to try weight loss medications but declined due to elevated cost, will have part D insurance as of January 2022.              Current Medications:  Current Outpatient  "Medications Ordered in Epic   Medication Sig Dispense Refill   • Multiple Vitamins-Minerals (EYE VITAMINS PO) Take  by mouth.     • Misc Natural Products (FOCUSED MIND PO) Take 2 Capsules by mouth every day.     • Multiple Vitamins-Minerals (ONE-A-DAY 50 PLUS PO) Take 1 capsule by mouth every day.     • SUPER B COMPLEX/C PO Take 1 capsule by mouth every day.     • Calcium Polycarbophil (FIBER) 625 MG Tab Take 1 tablet by mouth every day.     • COLLAGEN-ANTIMICROBIAL EX Apply 1 Packet topically every day.     • MAGNESIUM PO Take  by mouth.     • vitamin D (CHOLECALCIFEROL) 1000 UNIT Tab Take 1,000 Units by mouth every day.     • POTASSIUM PO Take  by mouth.       No current Epic-ordered facility-administered medications on file.          Objective:   Physical Exam:    Vitals: /64 (BP Location: Left arm, Patient Position: Sitting, BP Cuff Size: Adult)   Pulse (!) 55   Temp 36.2 °C (97.2 °F) (Temporal)   Resp 12   Ht 1.6 m (5' 3\")   Wt 106 kg (234 lb 4.8 oz)   SpO2 98%    BMI: Body mass index is 41.5 kg/m².  Physical Exam  Constitutional:       Appearance: Normal appearance.   HENT:      Ears:      Comments: Moderate cerumen impaction bilaterally  Eyes:      General: No scleral icterus.     Conjunctiva/sclera: Conjunctivae normal.   Cardiovascular:      Rate and Rhythm: Normal rate and regular rhythm.      Pulses: Normal pulses.      Heart sounds: No murmur heard.     Pulmonary:      Effort: Pulmonary effort is normal. No respiratory distress.      Breath sounds: Normal breath sounds. No wheezing or rhonchi.   Skin:     Findings: No rash.   Psychiatric:         Mood and Affect: Mood normal.         Behavior: Behavior normal.         Thought Content: Thought content normal.         Judgment: Judgment normal.               Assessment and Plan:   Luda is a 76 y.o. female with the following:  Problem List Items Addressed This Visit     Morbid obesity with BMI of 40.0-44.9, adult (HCC)     Chronic and improving " problem.  She has lost 10 pounds between June and September 2021, commended her on this also achievement.  Once she has part D drug coverage could consider adding a GLP-1 inhibitor to assist with additional weight loss.         Paroxysmal atrial fibrillation (HCC)     Chronic and ongoing problem.  No rate or rhythm control at this time.  Occurs while she is sleeping.  Working to lose weight and evaluation for obstructive sleep apnea underway.  Suspect it will be under better control once the sleep apnea is treated.  We will plan to add systemic anticoagulation in January when she has better part D coverage.         Secondary hypercoagulable state (HCC)     Chronic and ongoing problem.  We will have her follow-up after she has part D coverage and can discuss addition of anticoagulation at that time.  Discussed with her that when she is 77 she will have a [id number] risk score of 3.  She is agreeable to starting it at that time.  And           Other Visit Diagnoses     Need for vaccination        Relevant Orders    Influenza Vaccine, High Dose (65+ Only)    INFLUENZA VACCINE QUAD INJ PED (PF)    Shingrix Vaccine             Annual Health Assessment Questions:    1.  Are you currently engaging in any exercise or physical activity? Yes  Clean house  Cuts grass   2.  How would you describe your mood or emotional well-being today? tired    3.  Have you had any falls in the last year? No    4.  Have you noticed any problems with your balance or had difficulty walking? No    5.  In the last six months have you experienced any leakage of urine? Yes    6. DPA/Advanced Directive: Patient does not have an Advanced Directive.  A packet and workshop information was given on Advanced Directives.       RTC: Return in about 3 months (around 12/23/2021).    I spent a total of 38 minutes with record review, exam, communication with the patient, communication with other providers, and documentation of this encounter.    PLEASE NOTE:  This dictation was created using voice recognition software. I have made every reasonable attempt to correct obvious errors, but I expect that there are errors of grammar and possibly content that I did not discover before finalizing the note.      Kenna Alamo, DO  Geriatric and Internal Medicine  [facility]

## 2021-09-23 NOTE — ASSESSMENT & PLAN NOTE
Chronic and improving problem.  She has lost 10 pounds between June and September 2021, commended her on this also achievement.  Once she has part D drug coverage could consider adding a GLP-1 inhibitor to assist with additional weight loss.

## 2021-09-24 PROCEDURE — 95810 POLYSOM 6/> YRS 4/> PARAM: CPT | Performed by: FAMILY MEDICINE

## 2021-10-08 ENCOUNTER — OFFICE VISIT (OUTPATIENT)
Dept: SLEEP MEDICINE | Facility: MEDICAL CENTER | Age: 76
End: 2021-10-08
Payer: MEDICARE

## 2021-10-08 VITALS
HEART RATE: 76 BPM | DIASTOLIC BLOOD PRESSURE: 68 MMHG | SYSTOLIC BLOOD PRESSURE: 124 MMHG | BODY MASS INDEX: 41.14 KG/M2 | WEIGHT: 232.2 LBS | RESPIRATION RATE: 16 BRPM | HEIGHT: 63 IN | OXYGEN SATURATION: 97 %

## 2021-10-08 DIAGNOSIS — G47.33 OSA (OBSTRUCTIVE SLEEP APNEA): ICD-10-CM

## 2021-10-08 DIAGNOSIS — I48.0 PAROXYSMAL ATRIAL FIBRILLATION (HCC): ICD-10-CM

## 2021-10-08 DIAGNOSIS — I10 ESSENTIAL HYPERTENSION: ICD-10-CM

## 2021-10-08 DIAGNOSIS — I27.20 PULMONARY HTN (HCC): ICD-10-CM

## 2021-10-08 PROCEDURE — 99214 OFFICE O/P EST MOD 30 MIN: CPT | Performed by: NURSE PRACTITIONER

## 2021-10-08 ASSESSMENT — FIBROSIS 4 INDEX: FIB4 SCORE: 1.26

## 2021-10-08 NOTE — PATIENT INSTRUCTIONS
Sleep Apnea  Sleep apnea affects breathing during sleep. It causes breathing to stop for a short time or to become shallow. It can also increase the risk of:  · Heart attack.  · Stroke.  · Being very overweight (obese).  · Diabetes.  · Heart failure.  · Irregular heartbeat.  The goal of treatment is to help you breathe normally again.  What are the causes?  There are three kinds of sleep apnea:  · Obstructive sleep apnea. This is caused by a blocked or collapsed airway.  · Central sleep apnea. This happens when the brain does not send the right signals to the muscles that control breathing.  · Mixed sleep apnea. This is a combination of obstructive and central sleep apnea.  The most common cause of this condition is a collapsed or blocked airway. This can happen if:  · Your throat muscles are too relaxed.  · Your tongue and tonsils are too large.  · You are overweight.  · Your airway is too small.  What increases the risk?  · Being overweight.  · Smoking.  · Having a small airway.  · Being older.  · Being male.  · Drinking alcohol.  · Taking medicines to calm yourself (sedatives or tranquilizers).  · Having family members with the condition.  What are the signs or symptoms?  · Trouble staying asleep.  · Being sleepy or tired during the day.  · Getting angry a lot.  · Loud snoring.  · Headaches in the morning.  · Not being able to focus your mind (concentrate).  · Forgetting things.  · Less interest in sex.  · Mood swings.  · Personality changes.  · Feelings of sadness (depression).  · Waking up a lot during the night to pee (urinate).  · Dry mouth.  · Sore throat.  How is this diagnosed?  · Your medical history.  · A physical exam.  · A test that is done when you are sleeping (sleep study). The test is most often done in a sleep lab but may also be done at home.  How is this treated?    · Sleeping on your side.  · Using a medicine to get rid of mucus in your nose (decongestant).  · Avoiding the use of alcohol,  medicines to help you relax, or certain pain medicines (narcotics).  · Losing weight, if needed.  · Changing your diet.  · Not smoking.  · Using a machine to open your airway while you sleep, such as:  ? An oral appliance. This is a mouthpiece that shifts your lower jaw forward.  ? A CPAP device. This device blows air through a mask when you breathe out (exhale).  ? An EPAP device. This has valves that you put in each nostril.  ? A BPAP device. This device blows air through a mask when you breathe in (inhale) and breathe out.  · Having surgery if other treatments do not work.  It is important to get treatment for sleep apnea. Without treatment, it can lead to:  · High blood pressure.  · Coronary artery disease.  · In men, not being able to have an erection (impotence).  · Reduced thinking ability.  Follow these instructions at home:  Lifestyle  · Make changes that your doctor recommends.  · Eat a healthy diet.  · Lose weight if needed.  · Avoid alcohol, medicines to help you relax, and some pain medicines.  · Do not use any products that contain nicotine or tobacco, such as cigarettes, e-cigarettes, and chewing tobacco. If you need help quitting, ask your doctor.  General instructions  · Take over-the-counter and prescription medicines only as told by your doctor.  · If you were given a machine to use while you sleep, use it only as told by your doctor.  · If you are having surgery, make sure to tell your doctor you have sleep apnea. You may need to bring your device with you.  · Keep all follow-up visits as told by your doctor. This is important.  Contact a doctor if:  · The machine that you were given to use during sleep bothers you or does not seem to be working.  · You do not get better.  · You get worse.  Get help right away if:  · Your chest hurts.  · You have trouble breathing in enough air.  · You have an uncomfortable feeling in your back, arms, or stomach.  · You have trouble talking.  · One side of your  "body feels weak.  · A part of your face is hanging down.  These symptoms may be an emergency. Do not wait to see if the symptoms will go away. Get medical help right away. Call your local emergency services (911 in the [place] Do not drive yourself to the hospital.  Summary  · This condition affects breathing during sleep.  · The most common cause is a collapsed or blocked airway.  · The goal of treatment is to help you breathe normally while you sleep.  This information is not intended to replace advice given to you by your health care provider. Make sure you discuss any questions you have with your health care provider.  Document Released: 09/26/2009 Document Revised: 10/04/2019 Document Reviewed: 08/13/2019  [employer] Patient Education © 2020 [employer]        CPAP and BPAP Information  CPAP and BPAP are methods of helping a person breathe with the use of air pressure. CPAP stands for \"continuous positive airway pressure.\" BPAP stands for \"bi-level positive airway pressure.\" In both methods, air is blown through your nose or mouth and into your air passages to help you breathe well.  CPAP and BPAP use different amounts of pressure to blow air. With CPAP, the amount of pressure stays the same while you breathe in and out. With BPAP, the amount of pressure is increased when you breathe in (inhale) so that you can take larger breaths. Your health care provider will recommend whether CPAP or BPAP would be more helpful for you.  Why are CPAP and BPAP treatments used?  CPAP or BPAP can be helpful if you have:  · Sleep apnea.  · Chronic obstructive pulmonary disease (COPD).  · Heart failure.  · Medical conditions that weaken the muscles of the chest including muscular dystrophy, or neurological diseases such as amyotrophic lateral sclerosis (ALS).  · Other problems that cause breathing to be weak, abnormal, or difficult.  CPAP is most commonly used for obstructive sleep apnea (KOFI) to keep the airways from collapsing " when the muscles relax during sleep.  How is CPAP or BPAP administered?  Both CPAP and BPAP are provided by a small machine with a flexible plastic tube that attaches to a plastic mask. You wear the mask. Air is blown through the mask into your nose or mouth. The amount of pressure that is used to blow the air can be adjusted on the machine. Your health care provider will determine the pressure setting that should be used based on your individual needs.  When should CPAP or BPAP be used?  In most cases, the mask only needs to be worn during sleep. Generally, the mask needs to be worn throughout the night and during any daytime naps. People with certain medical conditions may also need to wear the mask at other times when they are awake. Follow instructions from your health care provider about when to use the machine.  What are some tips for using the mask?    · Because the mask needs to be snug, some people feel trapped or closed-in (claustrophobic) when first using the mask. If you feel this way, you may need to get used to the mask. One way to do this is by holding the mask loosely over your nose or mouth and then gradually applying the mask more snugly. You can also gradually increase the amount of time that you use the mask.  · Masks are available in various types and sizes. Some fit over your mouth and nose while others fit over just your nose. If your mask does not fit well, talk with your health care provider about getting a different one.  · If you are using a mask that fits over your nose and you tend to breathe through your mouth, a chin strap may be applied to help keep your mouth closed.  · The CPAP and BPAP machines have alarms that may sound if the mask comes off or develops a leak.  · If you have trouble with the mask, it is very important that you talk with your health care provider about finding a way to make the mask easier to tolerate. Do not stop using the mask. Stopping the use of the mask could  have a negative impact on your health.  What are some tips for using the machine?  · Place your CPAP or BPAP machine on a secure table or stand near an electrical outlet.  · Know where the on/off switch is located on the machine.  · Follow instructions from your health care provider about how to set the pressure on your machine and when you should use it.  · Do not eat or drink while the CPAP or BPAP machine is on. Food or fluids could get pushed into your lungs by the pressure of the CPAP or BPAP.  · Do not smoke. Tobacco smoke residue can damage the machine.  · For home use, CPAP and BPAP machines can be rented or purchased through home health care companies. Many different brands of machines are available. Renting a machine before purchasing may help you find out which particular machine works well for you.  · Keep the CPAP or BPAP machine and attachments clean. Ask your health care provider for specific instructions.  Get help right away if:  · You have redness or open areas around your nose or mouth where the mask fits.  · You have trouble using the CPAP or BPAP machine.  · You cannot tolerate wearing the CPAP or BPAP mask.  · You have pain, discomfort, and bloating in your abdomen.  Summary  · CPAP and BPAP are methods of helping a person breathe with the use of air pressure.  · Both CPAP and BPAP are provided by a small machine with a flexible plastic tube that attaches to a plastic mask.  · If you have trouble with the mask, it is very important that you talk with your health care provider about finding a way to make the mask easier to tolerate.  This information is not intended to replace advice given to you by your health care provider. Make sure you discuss any questions you have with your health care provider.  Document Released: 09/15/2005 Document Revised: 04/08/2020 Document Reviewed: 11/06/2017  [employer] Patient Education © 2020 [employer]        Obesity, Adult  Obesity is having too much body  fat. Being obese means that your weight is more than what is healthy for you.  BMI is a number that explains how much body fat you have. If you have a BMI of 30 or more, you are obese. Obesity is often caused by eating or drinking more calories than your body uses. Changing your lifestyle can help you lose weight.  Obesity can cause serious health problems, such as:  · Stroke.  · Coronary artery disease (CAD).  · Type 2 diabetes.  · Some types of cancer, including cancers of the colon, breast, uterus, and gallbladder.  · Osteoarthritis.  · High blood pressure (hypertension).  · High cholesterol.  · Sleep apnea.  · Gallbladder stones.  · Infertility problems.  What are the causes?  · Eating meals each day that are high in calories, sugar, and fat.  · Being born with genes that may make you more likely to become obese.  · Having a medical condition that causes obesity.  · Taking certain medicines.  · Sitting a lot (having a sedentary lifestyle).  · Not getting enough sleep.  · Drinking a lot of drinks that have sugar in them.  What increases the risk?  · Having a family history of obesity.  · Being an  woman.  · Being a  man.  · Living in an area with limited access to:  ? [place], recreation centers, or sidewalks.  ? Healthy food choices, such as grocery stores and [employer] markets.  What are the signs or symptoms?  The main sign is having too much body fat.  How is this treated?  · Treatment for this condition often includes changing your lifestyle. Treatment may include:  ? Changing your diet. This may include making a healthy meal plan.  ? Exercise. This may include activity that causes your heart to beat faster (aerobic exercise) and strength training. Work with your doctor to design a program that works for you.  ? Medicine to help you lose weight. This may be used if you are not able to lose 1 pound a week after 6 weeks of healthy eating and more exercise.  ? Treating conditions that cause  the obesity.  ? Surgery. Options may include gastric banding and gastric bypass. This may be done if:  § Other treatments have not helped to improve your condition.  § You have a BMI of 40 or higher.  § You have life-threatening health problems related to obesity.  Follow these instructions at home:  Eating and drinking    · Follow advice from your doctor about what to eat and drink. Your doctor may tell you to:  ? Limit fast food, sweets, and processed snack foods.  ? Choose low-fat options. For example, choose low-fat milk instead of whole milk.  ? Eat 5 or more servings of fruits or vegetables each day.  ? Eat at home more often. This gives you more control over what you eat.  ? Choose healthy foods when you eat out.  ? Learn to read food labels. This will help you learn how much food is in 1 serving.  ? Keep low-fat snacks available.  ? Avoid drinks that have a lot of sugar in them. These include soda, fruit juice, iced tea with sugar, and flavored milk.  · Drink enough water to keep your pee (urine) pale yellow.  · Do not go on fad diets.  Physical activity  · Exercise often, as told by your doctor. Most adults should get up to 150 minutes of moderate-intensity exercise every week.Ask your doctor:  ? What types of exercise are safe for you.  ? How often you should exercise.  · Warm up and stretch before being active.  · Do slow stretching after being active (cool down).  · Rest between times of being active.  Lifestyle  · Work with your doctor and a food expert (dietitian) to set a weight-loss goal that is best for you.  · Limit your screen time.  · Find ways to reward yourself that do not involve food.  · Do not drink alcohol if:  ? Your doctor tells you not to drink.  ? You are pregnant, may be pregnant, or are planning to become pregnant.  · If you drink alcohol:  ? Limit how much you use to:  § 0-1 drink a day for women.  § 0-2 drinks a day for men.  ? Be aware of how much alcohol is in your drink. In the  [place] one drink equals one 12 oz bottle of beer (355 mL), one 5 oz glass of wine (148 mL), or one 1½ oz glass of hard liquor (44 mL).  General instructions  · Keep a weight-loss journal. This can help you keep track of:  ? The food that you eat.  ? How much exercise you get.  · Take over-the-counter and prescription medicines only as told by your doctor.  · Take vitamins and supplements only as told by your doctor.  · Think about joining a support group.  · Keep all follow-up visits as told by your doctor. This is important.  Contact a doctor if:  · You cannot meet your weight loss goal after you have changed your diet and lifestyle for 6 weeks.  Get help right away if you:  · Are having trouble breathing.  · Are having thoughts of harming yourself.  Summary  · Obesity is having too much body fat.  · Being obese means that your weight is more than what is healthy for you.  · Work with your doctor to set a weight-loss goal.  · Get regular exercise as told by your doctor.  This information is not intended to replace advice given to you by your health care provider. Make sure you discuss any questions you have with your health care provider.  Document Released: 03/11/2013 Document Revised: 08/22/2019 Document Reviewed: 08/22/2019  [employer] Patient Education © 2020 [employer]

## 2021-10-08 NOTE — LETTER
October 8, 2021        Ruby Madgline Emmerling was seen in our office today. Please excuse her for    today. If you have any question please contact our office at [phone].       Sincerely        KATINA Abreu.

## 2021-10-08 NOTE — PROGRESS NOTES
Chief Complaint   Patient presents with   • Follow-Up     SS Results       HPI:      Mrs. Emmerling is a 77 y/o female patient who is in today for KOFI f/u and sleep study results. PMH includes Afib, HTN, pulmonary hypertension, KOFI, obesity, binge eating, chronic fatigue, former smoker.    Patient was referred by Dr. Alamo for evaluation and treatment of sleep disordered breathing.  She goes to bed between 7-7:30 pm and wakes up at 3:30 am. She denies morning headache, but reports snoring.  Also reports feeling tired throughout the day.  She currently is working from home at a [employer] center and tries to stay active by doing squats in leg exercises as her job tends to be very sedentary.  She has lost about 50 pounds since she has been consuming a much healthier diet and plans to get down to 200 pounds.  She currently weighs 232.  She will then further focus on more weight loss when she reaches this first goal.  Patient continues to follow-up with her cardiologist for atrial fibrillation and is not on a anticoagulant at this time due to her insurance.  She has a [employer] [place] plan which was the wrong plan for her because she is not a .  She will need to make a plan change and then will reach out to our office to get started on CPAP therapy.      Sleep/Card Study History:   PSG study from 9/17/21 indicated mild OAS with AHI of 8.1/hr and O2 loretta 78 %. AHI during rem was 30.7 and AHI while supine was 8.09. There was a mean oxygen saturation of 92.0% with a minimum oxygen saturation of 78.0%. Time spent during sleep with oxygen saturations below 89% was 22.0 minutes. The patient spent 0.5 minutes of TST with SaO2 <88%.    Echo from 9/16/21 indicated Normal left ventricular systolic function. The left ventricular ejection fraction is visually estimated to be 70%. Normal right ventricular size and systolic function. Trace tricuspid regurgitation. Estimated right ventricular systolic pressure is 30 mmHg.  Normal left atrial size. Trace mitral regurgitation.    ROS:    Constitutional: Denies fevers, Denies weight changes  Eyes: Denies changes in vision, no eye pain  Ears/Nose/Throat/Mouth: Denies nasal congestion or sore throat   Cardiovascular: Denies chest pain or palpitations   Respiratory: Denies shortness of breath , Denies cough  Gastrointestinal/Hepatic: Denies abdominal pain, nausea, vomiting,   Skin/Breast: Denies rash,   Neurological: Denies headache, confusion,   Psychiatric: denies mood disorder   Sleep: + snoring       Past Medical History:   Diagnosis Date   • Breast lump     lumpectomy   • Chickenpox    • [place] measles        Past Surgical History:   Procedure Laterality Date   • ANKLE FUSION     • CHOLECYSTECTOMY         Family History   Problem Relation Age of Onset   • Cancer Mother         Uterine/Lymphnode/Metastisized   • Heart Disease Sister    • Cancer Sister         Uterian   • Breast Cancer Maternal Aunt    • Cancer Sister         Skin   • Other Sister         something eating her from inside out/rotting skin   • Suicide Attempts Father    • No Known Problems Brother    • No Known Problems Brother    • Heart Disease Daughter         HO Congenital heart disease   • Other Daughter         epilepsy       Social History     Socioeconomic History   • Marital status: Single     Spouse name: Not on file   • Number of children: Not on file   • Years of education: Not on file   • Highest education level: Not on file   Occupational History   • Not on file   Tobacco Use   • Smoking status: Former Smoker     Packs/day: 2.00     Years: 32.00     Pack years: 64.00     Types: Cigarettes     Quit date: 1998     Years since quittin.3   • Smokeless tobacco: Never Used   • Tobacco comment: Started at 20   Vaping Use   • Vaping Use: Never used   Substance and Sexual Activity   • Alcohol use: Yes     Alcohol/week: 0.0 oz     Comment: 1 beer or a glass of wine per month   • Drug use: No   • Sexual  activity: Never     Partners: Male   Other Topics Concern   • Not on file   Social History Narrative    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[employer]     Social Determinants of Health     Financial Resource Strain:    • Difficulty of Paying Living Expenses:    Food Insecurity:    • Worried About Running Out of Food in the Last Year:    • Ran Out of Food in the Last Year:    Transportation Needs:    • Lack of Transportation (Medical):    • Lack of Transportation (Non-Medical):    Physical Activity:    • Days of Exercise per Week:    • Minutes of Exercise per Session:    Stress:    • Feeling of Stress :    Social Connections:    • Frequency of Communication with Friends and Family:    • Frequency of Social Gatherings with Friends and Family:    • Attends Gnosticism Services:    • Active Member of Clubs or Organizations:    • Attends Club or Organization Meetings:    • Marital Status:    Intimate Partner Violence:    • Fear of Current or Ex-Partner:    • Emotionally Abused:    • Physically Abused:    • Sexually Abused:        Allergies as of 10/08/2021 - Reviewed 10/08/2021   Allergen Reaction Noted   • Demerol Vomiting 02/07/2013        Vitals:  Vitals:    10/08/21 0747   BP: 124/68   Pulse: 76   Resp: 16   SpO2: 97%       Current medications as of today   Current Outpatient Medications   Medication Sig Dispense Refill   • Multiple Vitamins-Minerals (EYE VITAMINS PO) Take  by mouth.     • Misc Natural Products (FOCUSED MIND PO) Take 2 Capsules by mouth every day.     • Multiple Vitamins-Minerals (ONE-A-DAY 50 PLUS PO) Take 1 capsule by mouth every day.     • SUPER B COMPLEX/C PO Take 1 capsule by mouth every day.     • Calcium Polycarbophil (FIBER) 625 MG Tab Take 1 tablet by mouth every day.     • COLLAGEN-ANTIMICROBIAL EX Apply 1 Packet topically every day.     • MAGNESIUM PO Take  by mouth.     • vitamin D (CHOLECALCIFEROL) 1000 UNIT Tab Take 1,000 Units by mouth every day.     • POTASSIUM PO Take  by mouth.       No current  facility-administered medications for this visit.         Physical Exam: Limited by COVID-19 precautions.  Appearance: Well developed, well nourished, no acute distress  Eyes: PERRL, EOM intact, sclera white, conjunctiva moist  Ears: no lesions or deformities  Hearing: grossly intact  Nose: no lesions or deformities  Respiratory effort: no intercostal retractions or use of accessory muscles  Extremities: no cyanosis or edema  Abdomen: soft   Gait and Station: normal  Digits and nails: no clubbing, cyanosis, petechiae or nodes.  Cranial nerves: grossly intact  Skin: no visible rashes, lesions or ulcers noted  Orientation: Oriented to time, person and place  Mood and affect: mood and affect appropriate, normal interaction with examiner  Judgement: Intact    Assessment:  1. KOFI (obstructive sleep apnea)     2. Paroxysmal atrial fibrillation (HCC)     3. Pulmonary HTN (HCC)     4. Essential hypertension     5. BMI 40.0-44.9, adult ([facility])  Patient identified as having weight management issue.  Appropriate orders and counseling given.         Plan  Discussed the cardiovascular and neuropsychiatric risks of untreated KOFI; including but not limited to: HTN, DM, MI, ASCVD, CVA, CHF, traffic accidents.     1.  PSG study from 9/17/21 indicated mild OAS with AHI of 8.1/hr and O2 loretta 78 %. AHI during rem was 30.7 and AHI while supine was 8.09. There was a mean oxygen saturation of 92.0% with a minimum oxygen saturation of 78.0%. Time spent during sleep with oxygen saturations below 89% was 22.0 minutes. The patient spent 0.5 minutes of TST with SaO2 <88%.  Recommendation:  After review patient would be amenable to CPAP trial.  In some cases alternative treatment options may be proven effective in resolving sleep apnea. These options include upper airway surgery, the use of a dental orthotic, weight loss orpositional therapy.  These were reviewed with the patient.  Patient is also advised to avoid the supine position, alcohol 4  hours prior to bedtime, sedatives and opiates as all can exacerbate apnea.    *Patient needs to check with her insurance whether CPAP will be covered as she likely will need to change her plan.  She currently has a [employer] [place] plan which not indicated for her.  Advised patient to use the CPAP every night for more than four hours for optimal health benefit and to meet the health insurance 70% compliance guideline. Advised patient he may change the mask out if needed within the first 30 days after he receives his equipment.     * Sleep hygiene discussed. Recommend keeping a set sleep/wake schedule. Logging enough hours of sleep. Limiting/Avoiding naps. No caffeine after noon and no heavy meals in the evening.     2-4. Continue to f/u with cardiology, Dr. Alamo, at [facility].   5. Encouraged to continue with a healthy diet and exercise to help with weight loss and management.   6. Follow up with the appropriate healthcare practitioners for all other medical problems.  7. F/u in 4 months for first compliance, KOFI.       [email or web address].      This dictation was created using voice recognition software. The accuracy of the dictation is limited to the abilities of the software. I expect there may be some errors of grammar and possibly content.

## 2022-01-12 ENCOUNTER — OFFICE VISIT (OUTPATIENT)
Dept: MEDICAL GROUP | Facility: PHYSICIAN GROUP | Age: 77
End: 2022-01-12
Payer: MEDICARE

## 2022-01-12 VITALS
BODY MASS INDEX: 41.29 KG/M2 | RESPIRATION RATE: 16 BRPM | OXYGEN SATURATION: 97 % | HEIGHT: 63 IN | DIASTOLIC BLOOD PRESSURE: 80 MMHG | HEART RATE: 60 BPM | SYSTOLIC BLOOD PRESSURE: 130 MMHG | WEIGHT: 233 LBS | TEMPERATURE: 97.8 F

## 2022-01-12 DIAGNOSIS — E78.6 LOW HDL (UNDER 40): ICD-10-CM

## 2022-01-12 DIAGNOSIS — D68.69 SECONDARY HYPERCOAGULABLE STATE (HCC): ICD-10-CM

## 2022-01-12 DIAGNOSIS — E66.01 MORBID OBESITY WITH BMI OF 40.0-44.9, ADULT (HCC): ICD-10-CM

## 2022-01-12 DIAGNOSIS — I48.0 PAROXYSMAL ATRIAL FIBRILLATION (HCC): ICD-10-CM

## 2022-01-12 DIAGNOSIS — I10 ESSENTIAL HYPERTENSION: ICD-10-CM

## 2022-01-12 DIAGNOSIS — I77.9 DISORDER OF ARTERIES AND ARTERIOLES (HCC): ICD-10-CM

## 2022-01-12 PROCEDURE — 99214 OFFICE O/P EST MOD 30 MIN: CPT | Performed by: INTERNAL MEDICINE

## 2022-01-12 ASSESSMENT — PATIENT HEALTH QUESTIONNAIRE - PHQ9: CLINICAL INTERPRETATION OF PHQ2 SCORE: 0

## 2022-01-12 ASSESSMENT — FIBROSIS 4 INDEX: FIB4 SCORE: 1.26

## 2022-01-12 NOTE — ASSESSMENT & PLAN NOTE
Chronic ongoing problem.  Could not afford Eliquis on her previous insurance plan.  Now that we are in the new year it should be affordable with her drug coverage so we will start her on Eliquis 5 mg twice daily.  Asked her to stop aspirin now that she will be on DOAC.

## 2022-01-12 NOTE — PROGRESS NOTES
Subjective:   Chief Complaint/History of Present Illness:  Ruby M Emmerling is a 76 y.o. female established patient who presents today to discuss medical problems as listed below. Luda is unaccompanied for today's visit.    Problem   Secondary Hypercoagulable State (Hcc)    She has paroxysmal atrial fibrillation.  Recommended to go on systemic anticoagulation however she is happening pretty coverages not affordable.  She will plan to have coverage as of January 2022.  She continues on aspirin 81 mg in the meantime.    Initial [id number] score was 4 (history of MI, hypertension, age, gender) however stress test and echocardiogram did not show any evidence of prior MI and her blood pressure is controlled without pharmacotherapy so at this moment is closer to 2.    Current regimen: Eliquis 5 mg twice daily     Paroxysmal Atrial Fibrillation (Hcc)    She presents to clinic to review concerns over new onset atrial fibrillation.  She has an apple smart watch and on 2 occasions has been alerted of irregular rhythm.  These events were on April 25 and Marilee 10 and again in September while she was sleeping.  Normally occurs while she is supine in bed resting.  Heart rate goes as high as the mid 140s.  She is symptomatic with feeling restless.  No personal history and no prior evaluation.     She has history of morbid obesity, evaluation for sleep apnea underway.  Former smoker.  She has moderate alcohol consumption.  She had a reassuring stress test in 2013 but no recent cardiac evaluation.  No other history of lung disease. Lab work negative for thyroid disease, anemia, or electrolyte abnormality.     Disorder of Arteries and Arterioles (Hcc)    She had an abnormal screening test with right lower extremity 0.8 left lower extremity 0.83 on Quanta flow.  She is a former smoker of approximately 2 packs/day.  She has history of hyperlipidemia, hypertension, and morbid obesity.  No clear claudication at this time.     Low Hdl  (Under 40)       Ref. Range 6/25/2021 08:25   Cholesterol,Tot Latest Ref Range: 100 - 199 mg/dL 154   Triglycerides Latest Ref Range: 0 - 149 mg/dL 98   HDL Latest Ref Range: >=40 mg/dL 38 (A)   LDL Latest Ref Range: <100 mg/dL 96     She has history of mild LDL elevation.  She had an abnormal EKG since 2013 with Q waves in anterior leads suggestive of prior anterior infarct.  Nuclear stress test in 2013 did not show any abnormalities to confirm this finding.  No prior use of cholesterol-lowering medications.     Essential Hypertension    She was diagnosed with elevated blood pressure in 2013.  She is not using pharmacotherapy at this time.  Blood pressure in clinic has ranged 128-142/64-80.     EKG shows previous anterior infarct since 2013 with reassuring nuclear stress test completed at that time.         Morbid obesity with BMI of 40.0-44.9, adult (HCC)    She has elevated weight of 234 pounds with a BMI of 41.5, this is down from 244 pounds with a BMI of 43 in June 2021.  She was recommended to try weight loss medications but declined due to elevated cost, will have part D insurance as of January 2022.              Current Medications:  Current Outpatient Medications Ordered in Epic   Medication Sig Dispense Refill   • apixaban (ELIQUIS) 5mg Tab Take 1 Tablet by mouth 2 times a day. 180 Tablet 3   • Multiple Vitamins-Minerals (EYE VITAMINS PO) Take  by mouth.     • Misc Natural Products (FOCUSED MIND PO) Take 2 Capsules by mouth every day.     • Multiple Vitamins-Minerals (ONE-A-DAY 50 PLUS PO) Take 1 capsule by mouth every day.     • SUPER B COMPLEX/C PO Take 1 capsule by mouth every day.     • Calcium Polycarbophil (FIBER) 625 MG Tab Take 1 tablet by mouth every day.     • COLLAGEN-ANTIMICROBIAL EX Apply 1 Packet topically every day.     • MAGNESIUM PO Take  by mouth.     • vitamin D (CHOLECALCIFEROL) 1000 UNIT Tab Take 1,000 Units by mouth every day.     • POTASSIUM PO Take  by mouth.       No current  "Epic-ordered facility-administered medications on file.          Objective:   Physical Exam:    Vitals: /80 (BP Location: Left arm, Patient Position: Sitting, BP Cuff Size: Adult)   Pulse 60   Temp 36.6 °C (97.8 °F) (Temporal)   Resp 16   Ht 1.6 m (5' 3\")   Wt 106 kg (233 lb)   SpO2 97%    BMI: Body mass index is 41.27 kg/m².  Physical Exam  Constitutional:       General: She is not in acute distress.     Appearance: Normal appearance. She is obese. She is not ill-appearing.   Eyes:      General: No scleral icterus.     Conjunctiva/sclera: Conjunctivae normal.   Cardiovascular:      Rate and Rhythm: Normal rate and regular rhythm.      Pulses: Normal pulses.      Heart sounds: No murmur heard.      Pulmonary:      Effort: Pulmonary effort is normal. No respiratory distress.      Breath sounds: Normal breath sounds. No wheezing or rhonchi.   Abdominal:      General: Bowel sounds are normal.      Palpations: Abdomen is soft.      Tenderness: There is no abdominal tenderness.   Skin:     General: Skin is warm and dry.      Findings: No bruising or rash.   Psychiatric:         Mood and Affect: Mood normal.         Behavior: Behavior normal.         Thought Content: Thought content normal.         Judgment: Judgment normal.          Assessment and Plan:   Luda is a 76 y.o. female with the following:  Problem List Items Addressed This Visit     Morbid obesity with BMI of 40.0-44.9, adult (HCC)     Chronic and ongoing problem.  Continues to struggle with her weight, she feels she is the heaviest she has been.  She is actually 10 pounds down from where she was in June 2021.  I spent some time talking to her about practical strategies including weight watchers or Noom, portion control, and psychological support if she finds herself binge eating or experiencing shame after eating things that she feels she should not.  No history of prediabetes or type 2 diabetes but could consider oral Rybelsus to aid with " appetite control. She was appreciative of the discussion.         Low HDL (under 40)     Chronic and ongoing problem.  Update lipid panel to ensure stability.         Relevant Orders    Lipid Profile    Essential hypertension     Chronic and ongoing problem, currently at goal without pharmacotherapy.  She is continuing to try weight loss strategies.  We will follow closely and add medicine if indicated.         Relevant Medications    apixaban (ELIQUIS) 5mg Tab    Other Relevant Orders    CBC WITH DIFFERENTIAL    Comp Metabolic Panel    VITAMIN D,25 HYDROXY    VITAMIN B12    Disorder of arteries and arterioles (HCC)     Chronic and ongoing problem, continue screening annually to ensure stability.  No claudication at this time.         Relevant Medications    apixaban (ELIQUIS) 5mg Tab    Paroxysmal atrial fibrillation (HCC)     Chronic and stable problem.  She has not noted any recurrence of atrial fibrillation since her sleep apnea has been better controlled.  Blood pressure is at goal.  We will update lab work to ensure ongoing stability.  We will add Eliquis for stroke prophylaxis due to elevated [id number] score.  She will stop aspirin.  She feels that she is in normal sinus rhythm most of the time, no indication for rhythm or rate control with how she is doing currently.         Relevant Medications    apixaban (ELIQUIS) 5mg Tab    Other Relevant Orders    TSH    FREE THYROXINE    Secondary hypercoagulable state (HCC)     Chronic ongoing problem.  Could not afford Eliquis on her previous insurance plan.  Now that we are in the new year it should be affordable with her drug coverage so we will start her on Eliquis 5 mg twice daily.  Asked her to stop aspirin now that she will be on DOAC.         Relevant Medications    apixaban (ELIQUIS) 5mg Tab             Annual Health Assessment Questions:    1.  Are you currently engaging in any exercise or physical activity? No    2.  How would you describe your mood or  emotional well-being today? good    3.  Have you had any falls in the last year? No    4.  Have you noticed any problems with your balance or had difficulty walking? No    5.  In the last six months have you experienced any leakage of urine? Yes, if she holds it too long    6. DPA/Advanced Directive: Patient does not have an Advanced Directive.  A packet and workshop information was given on Advanced Directives.       RTC: Return in about 4 months (around 5/12/2022) for awv, labs before.    I spent a total of 34 minutes with record review, exam, communication with the patient, communication with other providers, and documentation of this encounter.    PLEASE NOTE: This dictation was created using voice recognition software. I have made every reasonable attempt to correct obvious errors, but I expect that there are errors of grammar and possibly content that I did not discover before finalizing the note.      Kenna Alamo, DO  Geriatric and Internal Medicine  [facility]

## 2022-01-12 NOTE — ASSESSMENT & PLAN NOTE
Chronic and ongoing problem, currently at goal without pharmacotherapy.  She is continuing to try weight loss strategies.  We will follow closely and add medicine if indicated.

## 2022-01-12 NOTE — ASSESSMENT & PLAN NOTE
Chronic and ongoing problem, continue screening annually to ensure stability.  No claudication at this time.

## 2022-01-12 NOTE — ASSESSMENT & PLAN NOTE
Chronic and stable problem.  She has not noted any recurrence of atrial fibrillation since her sleep apnea has been better controlled.  Blood pressure is at goal.  We will update lab work to ensure ongoing stability.  We will add Eliquis for stroke prophylaxis due to elevated [id number] score.  She will stop aspirin.  She feels that she is in normal sinus rhythm most of the time, no indication for rhythm or rate control with how she is doing currently.

## 2022-01-12 NOTE — LETTER
January 12, 2022    To Whom It May Concern:         This is confirmation that Ruby M Emmerling attended her scheduled appointment with Kenna Alamo D.O. on 1/12/22.         If you have any questions please do not hesitate to call me at the phone number listed below.    Sincerely,          Kenna Alamo D.O.  [phone]

## 2022-01-12 NOTE — ASSESSMENT & PLAN NOTE
Chronic and ongoing problem.  Continues to struggle with her weight, she feels she is the heaviest she has been.  She is actually 10 pounds down from where she was in June 2021.  I spent some time talking to her about practical strategies including weight watchers or Noom, portion control, and psychological support if she finds herself binge eating or experiencing shame after eating things that she feels she should not.  No history of prediabetes or type 2 diabetes but could consider oral Rybelsus to aid with appetite control. She was appreciative of the discussion.

## 2022-05-06 ENCOUNTER — APPOINTMENT (OUTPATIENT)
Dept: MEDICAL GROUP | Facility: PHYSICIAN GROUP | Age: 77
End: 2022-05-06
Payer: MEDICARE

## 2022-05-06 ENCOUNTER — HOSPITAL ENCOUNTER (OUTPATIENT)
Dept: LAB | Facility: MEDICAL CENTER | Age: 77
End: 2022-05-06
Attending: INTERNAL MEDICINE
Payer: MEDICARE

## 2022-05-06 DIAGNOSIS — I48.0 PAROXYSMAL ATRIAL FIBRILLATION (HCC): ICD-10-CM

## 2022-05-06 DIAGNOSIS — E78.6 LOW HDL (UNDER 40): ICD-10-CM

## 2022-05-06 DIAGNOSIS — I10 ESSENTIAL HYPERTENSION: ICD-10-CM

## 2022-05-06 LAB
25(OH)D3 SERPL-MCNC: 41 NG/ML (ref 30–100)
ALBUMIN SERPL BCP-MCNC: 4.5 G/DL (ref 3.2–4.9)
ALBUMIN/GLOB SERPL: 1.9 G/DL
ALP SERPL-CCNC: 82 U/L (ref 30–99)
ALT SERPL-CCNC: 26 U/L (ref 2–50)
ANION GAP SERPL CALC-SCNC: 11 MMOL/L (ref 7–16)
AST SERPL-CCNC: 23 U/L (ref 12–45)
BASOPHILS # BLD AUTO: 0.6 % (ref 0–1.8)
BASOPHILS # BLD: 0.04 K/UL (ref 0–0.12)
BILIRUB SERPL-MCNC: 1.1 MG/DL (ref 0.1–1.5)
BUN SERPL-MCNC: 24 MG/DL (ref 8–22)
CALCIUM SERPL-MCNC: 9 MG/DL (ref 8.5–10.5)
CHLORIDE SERPL-SCNC: 106 MMOL/L (ref 96–112)
CHOLEST SERPL-MCNC: 160 MG/DL (ref 100–199)
CO2 SERPL-SCNC: 23 MMOL/L (ref 20–33)
CREAT SERPL-MCNC: 0.67 MG/DL (ref 0.5–1.4)
EOSINOPHIL # BLD AUTO: 0.11 K/UL (ref 0–0.51)
EOSINOPHIL NFR BLD: 1.7 % (ref 0–6.9)
ERYTHROCYTE [DISTWIDTH] IN BLOOD BY AUTOMATED COUNT: 43 FL (ref 35.9–50)
FASTING STATUS PATIENT QL REPORTED: NORMAL
GFR SERPLBLD CREATININE-BSD FMLA CKD-EPI: 91 ML/MIN/1.73 M 2
GLOBULIN SER CALC-MCNC: 2.4 G/DL (ref 1.9–3.5)
GLUCOSE SERPL-MCNC: 94 MG/DL (ref 65–99)
HCT VFR BLD AUTO: 41.1 % (ref 37–47)
HDLC SERPL-MCNC: 45 MG/DL
HGB BLD-MCNC: 13.8 G/DL (ref 12–16)
IMM GRANULOCYTES # BLD AUTO: 0.03 K/UL (ref 0–0.11)
IMM GRANULOCYTES NFR BLD AUTO: 0.5 % (ref 0–0.9)
LDLC SERPL CALC-MCNC: 95 MG/DL
LYMPHOCYTES # BLD AUTO: 1.5 K/UL (ref 1–4.8)
LYMPHOCYTES NFR BLD: 23.7 % (ref 22–41)
MCH RBC QN AUTO: 29.4 PG (ref 27–33)
MCHC RBC AUTO-ENTMCNC: 33.6 G/DL (ref 33.6–35)
MCV RBC AUTO: 87.6 FL (ref 81.4–97.8)
MONOCYTES # BLD AUTO: 0.48 K/UL (ref 0–0.85)
MONOCYTES NFR BLD AUTO: 7.6 % (ref 0–13.4)
NEUTROPHILS # BLD AUTO: 4.16 K/UL (ref 2–7.15)
NEUTROPHILS NFR BLD: 65.9 % (ref 44–72)
NRBC # BLD AUTO: 0 K/UL
NRBC BLD-RTO: 0 /100 WBC
PLATELET # BLD AUTO: 296 K/UL (ref 164–446)
PMV BLD AUTO: 10.1 FL (ref 9–12.9)
POTASSIUM SERPL-SCNC: 4.3 MMOL/L (ref 3.6–5.5)
PROT SERPL-MCNC: 6.9 G/DL (ref 6–8.2)
RBC # BLD AUTO: 4.69 M/UL (ref 4.2–5.4)
SODIUM SERPL-SCNC: 140 MMOL/L (ref 135–145)
T4 FREE SERPL-MCNC: 1.21 NG/DL (ref 0.93–1.7)
TRIGL SERPL-MCNC: 102 MG/DL (ref 0–149)
TSH SERPL DL<=0.005 MIU/L-ACNC: 2.84 UIU/ML (ref 0.38–5.33)
VIT B12 SERPL-MCNC: 1058 PG/ML (ref 211–911)
WBC # BLD AUTO: 6.3 K/UL (ref 4.8–10.8)

## 2022-05-06 PROCEDURE — 82607 VITAMIN B-12: CPT

## 2022-05-06 PROCEDURE — 80053 COMPREHEN METABOLIC PANEL: CPT

## 2022-05-06 PROCEDURE — 85025 COMPLETE CBC W/AUTO DIFF WBC: CPT

## 2022-05-06 PROCEDURE — 36415 COLL VENOUS BLD VENIPUNCTURE: CPT

## 2022-05-06 PROCEDURE — 82306 VITAMIN D 25 HYDROXY: CPT

## 2022-05-06 PROCEDURE — 80061 LIPID PANEL: CPT

## 2022-05-06 PROCEDURE — 84443 ASSAY THYROID STIM HORMONE: CPT

## 2022-05-06 PROCEDURE — 84439 ASSAY OF FREE THYROXINE: CPT

## 2022-05-18 ENCOUNTER — TELEPHONE (OUTPATIENT)
Dept: MEDICAL GROUP | Facility: PHYSICIAN GROUP | Age: 77
End: 2022-05-18
Payer: MEDICARE

## 2022-05-18 NOTE — TELEPHONE ENCOUNTER
ESTABLISHED PATIENT PRE-VISIT PLANNING     Patient was NOT contacted to complete PVP.     Note: Patient will not be contacted if there is no indication to call.     1.  Reviewed notes from the last few office visits within the medical group: Yes    2.  If any orders were placed at last visit or intended to be done for this visit (i.e. 6 mos follow-up), do we have Results/Consult Notes?         •  Labs - Labs ordered, completed on 5/6/2022 and results are in chart.  Note: If patient appointment is for lab review and patient did not complete labs, check with provider if OK to reschedule patient until labs completed.       •  Imaging - Imaging was not ordered at last office visit.       •  Referrals - No referrals were ordered at last office visit.    3. Is this appointment scheduled as a Hospital Follow-Up? No    4.  Immunizations were updated in Epic using Reconcile Outside Information activity? Yes    5.  Patient is due for the following Health Maintenance Topics:   There are no preventive care reminders to display for this patient.      6.  AHA (Pulse8) form printed for Provider? No, already completed

## 2022-05-19 ENCOUNTER — OFFICE VISIT (OUTPATIENT)
Dept: MEDICAL GROUP | Facility: PHYSICIAN GROUP | Age: 77
End: 2022-05-19
Payer: MEDICARE

## 2022-05-19 VITALS
HEART RATE: 54 BPM | WEIGHT: 236.2 LBS | OXYGEN SATURATION: 99 % | HEIGHT: 63 IN | TEMPERATURE: 96.8 F | DIASTOLIC BLOOD PRESSURE: 74 MMHG | RESPIRATION RATE: 12 BRPM | SYSTOLIC BLOOD PRESSURE: 128 MMHG | BODY MASS INDEX: 41.85 KG/M2

## 2022-05-19 DIAGNOSIS — D68.69 SECONDARY HYPERCOAGULABLE STATE (HCC): ICD-10-CM

## 2022-05-19 DIAGNOSIS — Z13.79 GENETIC SCREENING: ICD-10-CM

## 2022-05-19 DIAGNOSIS — I10 ESSENTIAL HYPERTENSION: ICD-10-CM

## 2022-05-19 DIAGNOSIS — E66.01 MORBID OBESITY WITH BMI OF 40.0-44.9, ADULT (HCC): ICD-10-CM

## 2022-05-19 DIAGNOSIS — G47.33 OSA (OBSTRUCTIVE SLEEP APNEA): ICD-10-CM

## 2022-05-19 DIAGNOSIS — I48.0 PAROXYSMAL ATRIAL FIBRILLATION (HCC): ICD-10-CM

## 2022-05-19 PROBLEM — E78.6 LOW HDL (UNDER 40): Status: RESOLVED | Noted: 2018-10-15 | Resolved: 2022-05-19

## 2022-05-19 PROCEDURE — 99214 OFFICE O/P EST MOD 30 MIN: CPT | Performed by: INTERNAL MEDICINE

## 2022-05-19 ASSESSMENT — FIBROSIS 4 INDEX: FIB4 SCORE: 1.13

## 2022-05-19 NOTE — ASSESSMENT & PLAN NOTE
Previous problem, stable off medication, will continue periodically monitor blood pressure but no indication at pharmacotherapy at this time.

## 2022-05-19 NOTE — ASSESSMENT & PLAN NOTE
Chronic and ongoing problem.  She would like to meet with the weight loss clinic to see what medical option she would qualify for to assist her in weight loss.  We will fill out the referral form for Dr. Santana.

## 2022-05-19 NOTE — PROGRESS NOTES
Subjective:   Chief Complaint/History of Present Illness:  Ruby Madgline Emmerling is a 76 y.o. female established patient who presents today to discuss medical problems as listed below. Luda is unaccompanied for today's visit.    Problem   Giovanni (Obstructive Sleep Apnea)    PSG study from 9/17/21 indicated mild OAS with AHI of 8.1/hr and O2 loretta 78 %. AHI during rem was 30.7 and AHI while supine was 8.09. There was a mean oxygen saturation of 92.0% with a minimum oxygen saturation of 78.0%. Time spent during sleep with oxygen saturations below 89% was 22.0 minutes. The patient spent 0.5 minutes of TST with SaO2 <88%.     Secondary Hypercoagulable State (Hcc)    She has paroxysmal atrial fibrillation.  Recommended to go on systemic anticoagulation however she is happening pretty coverages not affordable.  She will plan to have coverage as of January 2022.  She continues on aspirin 81 mg in the meantime.    Initial [id number] score was 4 (history of MI, hypertension, age, gender) however stress test and echocardiogram did not show any evidence of prior MI and her blood pressure is controlled without pharmacotherapy so at this moment is closer to 2.    Current regimen: Eliquis 5 mg twice daily     Paroxysmal Atrial Fibrillation (Hcc)    She presents to clinic to review concerns over new onset atrial fibrillation.  She has an apple smart watch and on 2 occasions has been alerted of irregular rhythm.  These events were on April 25 and Marilee 10 and again in September while she was sleeping.  Normally occurs while she is supine in bed resting.  Heart rate goes as high as the mid 140s.  She is symptomatic with feeling restless.  No personal history and no prior evaluation.     She has history of morbid obesity, evaluation for sleep apnea underway.  Former smoker.  She has moderate alcohol consumption.  She had a reassuring stress test in 2013 but no recent cardiac evaluation.  No other history of lung disease. Lab work  negative for thyroid disease, anemia, or electrolyte abnormality.     Essential hypertension- off pharmacotherapy    She was diagnosed with elevated blood pressure in 2013.  She is not using pharmacotherapy at this time.      Blood pressure in clinic has ranged 124-130/68-80    EKG shows previous anterior infarct since 2013 with reassuring nuclear stress test completed at that time.         Morbid obesity with BMI of 40.0-44.9, adult ([facility])    She has elevated weight of 236 pounds with a BMI of 41.84, this is down from 244 pounds with a BMI of 43 in June 2021.  She was recommended to try weight loss medications but declined due to elevated cost, will have part D insurance as of January 2022.         Low Hdl (Under 40) (Resolved)       Ref. Range 6/25/2021 08:25   Cholesterol,Tot Latest Ref Range: 100 - 199 mg/dL 154   Triglycerides Latest Ref Range: 0 - 149 mg/dL 98   HDL Latest Ref Range: >=40 mg/dL 38 (A)   LDL Latest Ref Range: <100 mg/dL 96     She has history of mild LDL elevation.  She had an abnormal EKG since 2013 with Q waves in anterior leads suggestive of prior anterior infarct.  Nuclear stress test in 2013 did not show any abnormalities to confirm this finding.  No prior use of cholesterol-lowering medications.          Current Medications:  Current Outpatient Medications Ordered in Epic   Medication Sig Dispense Refill   • apixaban (ELIQUIS) 5mg Tab Take 1 Tablet by mouth 2 times a day. 180 Tablet 3   • Multiple Vitamins-Minerals (EYE VITAMINS PO) Take  by mouth.     • Misc Natural Products (FOCUSED MIND PO) Take 2 Capsules by mouth every day.     • Multiple Vitamins-Minerals (ONE-A-DAY 50 PLUS PO) Take 1 capsule by mouth every day.     • SUPER B COMPLEX/C PO Take 1 capsule by mouth every day.     • Calcium Polycarbophil (FIBER) 625 MG Tab Take 1 tablet by mouth every day.     • COLLAGEN-ANTIMICROBIAL EX Apply 1 Packet topically every day.     • MAGNESIUM PO Take  by mouth.     • vitamin D  "(CHOLECALCIFEROL) 1000 UNIT Tab Take 1,000 Units by mouth every day.     • POTASSIUM PO Take  by mouth.       No current Epic-ordered facility-administered medications on file.          Objective:   Physical Exam:    Vitals: /74 (BP Location: Right arm, Patient Position: Sitting, BP Cuff Size: Adult)   Pulse (!) 54   Temp 36 °C (96.8 °F) (Temporal)   Resp 12   Ht 1.6 m (5' 3\")   Wt 107 kg (236 lb 3.2 oz)   SpO2 99%    BMI: Body mass index is 41.84 kg/m².  Physical Exam  Constitutional:       General: She is not in acute distress.     Appearance: Normal appearance. She is not ill-appearing or toxic-appearing.   HENT:      Right Ear: Tympanic membrane and ear canal normal. There is no impacted cerumen.      Left Ear: Tympanic membrane and ear canal normal. There is no impacted cerumen.   Eyes:      General: No scleral icterus.     Conjunctiva/sclera: Conjunctivae normal.   Cardiovascular:      Rate and Rhythm: Regular rhythm. Bradycardia present.      Pulses: Normal pulses.   Pulmonary:      Effort: Pulmonary effort is normal. No respiratory distress.      Breath sounds: Normal breath sounds. No wheezing or rhonchi.   Musculoskeletal:      Comments: Mild lymphedema bilaterally, nonpitting, pretibial   Skin:     General: Skin is warm and dry.      Findings: No bruising or rash.   Neurological:      Gait: Gait normal.   Psychiatric:         Mood and Affect: Mood normal.         Behavior: Behavior normal.         Thought Content: Thought content normal.         Judgment: Judgment normal.          Assessment and Plan:   Luda is a 76 y.o. female with the following:  Problem List Items Addressed This Visit     Morbid obesity with BMI of 40.0-44.9, adult (HCC)     Chronic and ongoing problem.  She would like to meet with the weight loss clinic to see what medical option she would qualify for to assist her in weight loss.  We will fill out the referral form for Dr. Santana.           Essential hypertension- off " pharmacotherapy     Previous problem, stable off medication, will continue periodically monitor blood pressure but no indication at pharmacotherapy at this time.           Paroxysmal atrial fibrillation (HCC)     Chronic and stable problem.  Her apple watch has only detected 1 or 2 episodes of atrial fibrillation in the past 6 months. Continue periodically monitoring rate and rhythm.           Secondary hypercoagulable state (HCC)     Chronic and stable problem, no bruising or bleeding complications, continue Eliquis 5 mg twice daily, will transition to a mail order pharmacy.           KOFI (obstructive sleep apnea)     Chronic and ongoing problem.  Continue follow-up with sleep medicine as recommended.  Important to treat this due to history of atrial fibrillation.             Other Visit Diagnoses     Genetic screening        Relevant Orders    Referral to Genetic Research Studies           RTC: Return in about 6 months (around 11/19/2022).    I spent a total of 38 minutes with record review, exam, communication with the patient, communication with other providers, and documentation of this encounter.    PLEASE NOTE: This dictation was created using voice recognition software. I have made every reasonable attempt to correct obvious errors, but I expect that there are errors of grammar and possibly content that I did not discover before finalizing the note.      Kenna Alamo, DO  Geriatric and Internal Medicine  [facility]

## 2022-05-19 NOTE — ASSESSMENT & PLAN NOTE
Chronic and stable problem, no bruising or bleeding complications, continue Eliquis 5 mg twice daily, will transition to a mail order pharmacy.

## 2022-05-19 NOTE — ASSESSMENT & PLAN NOTE
Chronic and stable problem.  Her apple watch has only detected 1 or 2 episodes of atrial fibrillation in the past 6 months. Continue periodically monitoring rate and rhythm.

## 2022-05-19 NOTE — ASSESSMENT & PLAN NOTE
Chronic and ongoing problem.  Continue follow-up with sleep medicine as recommended.  Important to treat this due to history of atrial fibrillation.

## 2022-05-20 PROBLEM — R53.82 CHRONIC FATIGUE: Status: RESOLVED | Noted: 2019-01-21 | Resolved: 2022-05-20

## 2022-05-20 PROBLEM — R63.2 BINGE EATING: Status: RESOLVED | Noted: 2019-01-21 | Resolved: 2022-05-20

## 2022-05-20 PROBLEM — R32 URINARY INCONTINENCE: Status: RESOLVED | Noted: 2021-06-22 | Resolved: 2022-05-20

## 2022-05-20 PROBLEM — I77.9 DISORDER OF ARTERIES AND ARTERIOLES (HCC): Status: RESOLVED | Noted: 2021-06-22 | Resolved: 2022-05-20

## 2022-05-20 PROBLEM — M54.50 CHRONIC BILATERAL LOW BACK PAIN WITHOUT SCIATICA: Status: ACTIVE | Noted: 2018-10-15

## 2022-06-10 ENCOUNTER — RESEARCH ENCOUNTER (OUTPATIENT)
Dept: MEDICAL GROUP | Facility: PHYSICIAN GROUP | Age: 77
End: 2022-06-10
Attending: INTERNAL MEDICINE
Payer: MEDICARE

## 2022-06-10 DIAGNOSIS — Z00.6 RESEARCH STUDY PATIENT: ICD-10-CM

## 2022-06-18 ENCOUNTER — HOSPITAL ENCOUNTER (OUTPATIENT)
Facility: MEDICAL CENTER | Age: 77
End: 2022-06-18
Attending: PATHOLOGY
Payer: COMMERCIAL

## 2022-06-18 DIAGNOSIS — Z00.6 RESEARCH STUDY PATIENT: ICD-10-CM

## 2022-06-24 LAB
ELF SCORE: 9.4
RELATIVE RISK: NORMAL
RISK GROUP: NORMAL
RISK: 3.3 %

## 2022-07-11 LAB
APOB+LDLR+PCSK9 GENE MUT ANL BLD/T: NOT DETECTED
BRCA1+BRCA2 DEL+DUP + FULL MUT ANL BLD/T: NOT DETECTED
MLH1+MSH2+MSH6+PMS2 GN DEL+DUP+FUL M: NOT DETECTED

## 2022-10-19 ENCOUNTER — TELEPHONE (OUTPATIENT)
Dept: MEDICAL GROUP | Facility: PHYSICIAN GROUP | Age: 77
End: 2022-10-19
Payer: MEDICARE

## 2022-10-19 NOTE — TELEPHONE ENCOUNTER
1. Caller Name: Ruby Madgline Emmerling                          Call Back Number: [phone] (home) [phone] (work)        How would the patient prefer to be contacted with a response: Phone call OK to leave a detailed message    Called patient and left a message.  Need to reschedule 11/16/22 appointment.  Offered 10/26/22 at 7:40am   Will wait for her response

## 2022-10-26 ENCOUNTER — OFFICE VISIT (OUTPATIENT)
Dept: MEDICAL GROUP | Facility: PHYSICIAN GROUP | Age: 77
End: 2022-10-26
Payer: MEDICARE

## 2022-10-26 VITALS
TEMPERATURE: 96.6 F | DIASTOLIC BLOOD PRESSURE: 72 MMHG | SYSTOLIC BLOOD PRESSURE: 138 MMHG | WEIGHT: 236 LBS | HEART RATE: 72 BPM | BODY MASS INDEX: 41.82 KG/M2 | OXYGEN SATURATION: 97 % | RESPIRATION RATE: 16 BRPM | HEIGHT: 63 IN

## 2022-10-26 DIAGNOSIS — Z12.11 SCREENING FOR COLORECTAL CANCER: ICD-10-CM

## 2022-10-26 DIAGNOSIS — Z13.220 ENCOUNTER FOR LIPID SCREENING FOR CARDIOVASCULAR DISEASE: ICD-10-CM

## 2022-10-26 DIAGNOSIS — I10 ESSENTIAL HYPERTENSION: ICD-10-CM

## 2022-10-26 DIAGNOSIS — R73.01 ELEVATED FASTING BLOOD SUGAR: ICD-10-CM

## 2022-10-26 DIAGNOSIS — M79.641 PAIN OF RIGHT HAND: ICD-10-CM

## 2022-10-26 DIAGNOSIS — Z23 NEED FOR VACCINATION: ICD-10-CM

## 2022-10-26 DIAGNOSIS — G47.33 OSA (OBSTRUCTIVE SLEEP APNEA): ICD-10-CM

## 2022-10-26 DIAGNOSIS — E66.01 MORBID OBESITY WITH BMI OF 40.0-44.9, ADULT (HCC): ICD-10-CM

## 2022-10-26 DIAGNOSIS — I48.0 PAROXYSMAL ATRIAL FIBRILLATION (HCC): ICD-10-CM

## 2022-10-26 DIAGNOSIS — I27.20 PULMONARY HYPERTENSION (HCC): ICD-10-CM

## 2022-10-26 DIAGNOSIS — Z12.12 SCREENING FOR COLORECTAL CANCER: ICD-10-CM

## 2022-10-26 DIAGNOSIS — Z12.31 ENCOUNTER FOR SCREENING MAMMOGRAM FOR BREAST CANCER: ICD-10-CM

## 2022-10-26 DIAGNOSIS — Z13.6 ENCOUNTER FOR LIPID SCREENING FOR CARDIOVASCULAR DISEASE: ICD-10-CM

## 2022-10-26 PROCEDURE — G0008 ADMIN INFLUENZA VIRUS VAC: HCPCS | Performed by: INTERNAL MEDICINE

## 2022-10-26 PROCEDURE — 90662 IIV NO PRSV INCREASED AG IM: CPT | Performed by: INTERNAL MEDICINE

## 2022-10-26 PROCEDURE — 99214 OFFICE O/P EST MOD 30 MIN: CPT | Mod: 25 | Performed by: INTERNAL MEDICINE

## 2022-10-26 ASSESSMENT — FIBROSIS 4 INDEX: FIB4 SCORE: 1.14

## 2022-10-26 NOTE — PROGRESS NOTES
Subjective:   Chief Complaint/History of Present Illness:  Ruby Madgline Emmerling is a 77 y.o. female established patient who presents today to discuss medical problems as listed below. Luda is unaccompanied for today's visit.    Problem   Pulmonary hypertension (HCC) RVSP 30    On echocardiogram at time of atrial fibrillation diagnosis she was noted to have mild pulmonary hypertension with RVSP of 30.  This is likely related to untreated sleep apnea and history of hypertension.     Pain of Right Hand    She has some pain in the right hand at the MCP joint line around the third digit.  She does a lot of typing at work and wonders if it could be related to repeated motion.  No paresthesias, no radiculopathy, no weakness.  She has not tried anything yet for this problem.     Giovanni (Obstructive Sleep Apnea)    PSG study from 9/17/21 indicated mild OAS with AHI of 8.1/hr and O2 loretta 78 %. AHI during rem was 30.7 and AHI while supine was 8.09. There was a mean oxygen saturation of 92.0% with a minimum oxygen saturation of 78.0%. Time spent during sleep with oxygen saturations below 89% was 22.0 minutes. The patient spent 0.5 minutes of TST with SaO2 <88%.     Paroxysmal Atrial Fibrillation (Hcc)    She presents to clinic to review concerns over new onset atrial fibrillation.  She has an apple smart watch and on 2 occasions has been alerted of irregular rhythm.  These events were on April 25 and Marilee 10 and again in September while she was sleeping.  Normally occurs while she is supine in bed resting.  Heart rate goes as high as the mid 140s.  She is symptomatic with feeling restless.  No personal history and no prior evaluation.     She has history of morbid obesity, evaluation for sleep apnea underway.  Former smoker.  She has moderate alcohol consumption.  She had a reassuring stress test in 2013 but no recent cardiac evaluation.  No other history of lung disease. Lab work negative for thyroid disease, anemia, or  "electrolyte abnormality.     Essential Hypertension    She was diagnosed with elevated blood pressure in 2013.  She is not using pharmacotherapy at this time.      Blood pressure in clinic has ranged 124-138/70-74    EKG shows previous anterior infarct since 2013 with reassuring nuclear stress test completed at that time.         Morbid obesity with BMI of 40.0-44.9, adult (formerly [facility])    She has elevated weight of 236 pounds with a BMI of 41.81, this is down from 244 pounds with a BMI of 43 in June 2021.           Current Medications:  Current Outpatient Medications Ordered in Epic   Medication Sig Dispense Refill    FIBER PO Take  by mouth. 2 gummies daily      COLLAGEN PO Take  by mouth. 1 scoop powder daily      MULTIPLE VITAMIN PO Take 1 Tablet by mouth every day.      Multiple Vitamins-Minerals (PRESERVISION AREDS 2 PO) Take 2 Tablets by mouth every day.      apixaban (ELIQUIS) 5mg Tab Take 1 Tablet by mouth 2 times a day. 180 Tablet 3    Misc Natural Products (FOCUSED MIND PO) Take 2 Capsules by mouth every day.      SUPER B COMPLEX/C PO Take 1 capsule by mouth every day.      MAGNESIUM PO Take 1 Tablet by mouth. Unable to recall dose      vitamin D (CHOLECALCIFEROL) 1000 UNIT Tab Take 1,000 Units by mouth every day.      POTASSIUM PO Take 1 Tablet by mouth every day. Unable to recall dose       No current Epic-ordered facility-administered medications on file.          Objective:   Physical Exam:    Vitals: /72 (BP Location: Left arm, Patient Position: Sitting, BP Cuff Size: Adult)   Pulse 72   Temp 35.9 °C (96.6 °F) (Temporal)   Resp 16   Ht 1.6 m (5' 3\")   Wt 107 kg (236 lb)   SpO2 97%    BMI: Body mass index is 41.81 kg/m².  Physical Exam  Constitutional:       General: She is not in acute distress.     Appearance: Normal appearance. She is not ill-appearing.   HENT:      Right Ear: There is no impacted cerumen.      Left Ear: There is impacted cerumen.   Eyes:      General: No scleral icterus.     " Conjunctiva/sclera: Conjunctivae normal.   Cardiovascular:      Rate and Rhythm: Normal rate and regular rhythm.      Pulses: Normal pulses.   Pulmonary:      Effort: Pulmonary effort is normal. No respiratory distress.      Breath sounds: No wheezing or rhonchi.   Musculoskeletal:         General: Tenderness present.      Comments: Right hand 3rd MCP, no swelling or erythema   Lymphadenopathy:      Cervical: No cervical adenopathy.   Skin:     General: Skin is warm and dry.      Findings: No bruising or rash.   Neurological:      Comments: Ambulates independently and without a gait aid.   Psychiatric:         Mood and Affect: Mood normal.         Behavior: Behavior normal.         Thought Content: Thought content normal.         Judgment: Judgment normal.             Assessment and Plan:   Luda is a 77 y.o. female with the following:  Problem List Items Addressed This Visit       Morbid obesity with BMI of 40.0-44.9, adult (HCC)     Chronic and ongoing problem.  Could consider use of Wegovy to aid with weight loss through medications if she be interested, had trouble following through with weight loss medicine referral previously put through for Dr. Vega's office.  We will get all of her other health maintenance updated and can discuss in more detail at her next follow-up.         Relevant Orders    HEMOGLOBIN A1C    Essential hypertension     Chronic and ongoing problem.  Continue following closely and if systolic continues to run in the high 130s or low 140s then would recommend adding pharmacotherapy to more tightly control her blood pressure.  Could also be made worse by untreated sleep apnea.         Relevant Orders    CBC WITH DIFFERENTIAL    Comp Metabolic Panel    VITAMIN D,25 HYDROXY (DEFICIENCY)    VITAMIN B12    Paroxysmal atrial fibrillation (HCC)     Chronic ongoing problem, suspect this is secondary to untreated sleep apnea, advised her to call sleep medicine to make a follow-up appointment and  initiate on CPAP therapy.  Continue on Eliquis for stroke prophylaxis.         Relevant Orders    TSH    FREE THYROXINE    KOFI (obstructive sleep apnea)     Chronic and ongoing problem, did not follow through with initiating on positive pressure therapy with sleep medicine, encouraged her to call make a follow-up appointment as this is the likely underlying cause of her atrial fibrillation.  She voiced understanding and will call and make follow-up arrangements.         Pulmonary hypertension (HCC) RVSP 30     Chronic and ongoing problem, encouraged her to call sleep medicine to follow-up and get on treatment for sleep apnea which should improve her proximal atrial fibrillation as well as her mild pulmonary hypertension.  No signs of heart failure or hypervolemia on exam today.         Pain of right hand     New and decompensated problem.  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  Recommend she try topical Voltaren/diclofenac to see if this helps reduce inflammation.  No oral NSAIDs due to use of DOAC.          Other Visit Diagnoses       Encounter for screening mammogram for breast cancer        Relevant Orders    MA-SCREENING MAMMO BILAT W/TOMOSYNTHESIS W/CAD    Need for vaccination        Relevant Orders    INFLUENZA VACCINE, HIGH DOSE (65+ ONLY) (Completed)    Screening for colorectal cancer        Relevant Orders    Referral to GI for Colonoscopy    Encounter for lipid screening for cardiovascular disease        Relevant Orders    Lipid Profile    Elevated fasting blood sugar        Relevant Orders    HEMOGLOBIN A1C            RTC: No follow-ups on file.    I spent a total of 36 minutes with record review, exam, communication with the patient, communication with other providers, and documentation of this encounter.    PLEASE NOTE: This dictation was created using voice recognition software. I have made every reasonable attempt to correct obvious errors, but I expect that there are errors of grammar and  possibly content that I did not discover before finalizing the note.      Kenna Alamo, DO  Geriatric and Internal Medicine  [facility]

## 2022-10-26 NOTE — ASSESSMENT & PLAN NOTE
Chronic and ongoing problem.  Continue following closely and if systolic continues to run in the high 130s or low 140s then would recommend adding pharmacotherapy to more tightly control her blood pressure.  Could also be made worse by untreated sleep apnea.

## 2022-10-26 NOTE — ASSESSMENT & PLAN NOTE
Chronic and ongoing problem.  Could consider use of Wegovy to aid with weight loss through medications if she be interested, had trouble following through with weight loss medicine referral previously put through for Dr. Vega's office.  We will get all of her other health maintenance updated and can discuss in more detail at her next follow-up.

## 2022-10-26 NOTE — ASSESSMENT & PLAN NOTE
Chronic ongoing problem, suspect this is secondary to untreated sleep apnea, advised her to call sleep medicine to make a follow-up appointment and initiate on CPAP therapy.  Continue on Eliquis for stroke prophylaxis.

## 2022-10-26 NOTE — ASSESSMENT & PLAN NOTE
Chronic and ongoing problem, encouraged her to call sleep medicine to follow-up and get on treatment for sleep apnea which should improve her proximal atrial fibrillation as well as her mild pulmonary hypertension.  No signs of heart failure or hypervolemia on exam today.

## 2022-10-26 NOTE — ASSESSMENT & PLAN NOTE
Chronic and ongoing problem, did not follow through with initiating on positive pressure therapy with sleep medicine, encouraged her to call make a follow-up appointment as this is the likely underlying cause of her atrial fibrillation.  She voiced understanding and will call and make follow-up arrangements.

## 2022-10-26 NOTE — ASSESSMENT & PLAN NOTE
New and decompensated problem.  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  Recommend she try topical Voltaren/diclofenac to see if this helps reduce inflammation.  No oral NSAIDs due to use of DOAC.

## 2022-11-09 ENCOUNTER — DOCUMENTATION (OUTPATIENT)
Dept: HEALTH INFORMATION MANAGEMENT | Facility: OTHER | Age: 77
End: 2022-11-09
Payer: MEDICARE

## 2022-11-15 ENCOUNTER — APPOINTMENT (OUTPATIENT)
Dept: RADIOLOGY | Facility: MEDICAL CENTER | Age: 77
End: 2022-11-15
Attending: INTERNAL MEDICINE
Payer: MEDICARE

## 2022-11-15 DIAGNOSIS — Z12.31 ENCOUNTER FOR SCREENING MAMMOGRAM FOR BREAST CANCER: ICD-10-CM

## 2022-11-15 PROCEDURE — 77067 SCR MAMMO BI INCL CAD: CPT

## 2022-11-28 ENCOUNTER — HOSPITAL ENCOUNTER (OUTPATIENT)
Dept: LAB | Facility: MEDICAL CENTER | Age: 77
End: 2022-11-28
Attending: INTERNAL MEDICINE
Payer: MEDICARE

## 2022-11-28 DIAGNOSIS — E66.01 MORBID OBESITY WITH BMI OF 40.0-44.9, ADULT (HCC): ICD-10-CM

## 2022-11-28 DIAGNOSIS — I10 ESSENTIAL HYPERTENSION: ICD-10-CM

## 2022-11-28 DIAGNOSIS — I48.0 PAROXYSMAL ATRIAL FIBRILLATION (HCC): ICD-10-CM

## 2022-11-28 DIAGNOSIS — Z13.6 ENCOUNTER FOR LIPID SCREENING FOR CARDIOVASCULAR DISEASE: ICD-10-CM

## 2022-11-28 DIAGNOSIS — R73.01 ELEVATED FASTING BLOOD SUGAR: ICD-10-CM

## 2022-11-28 DIAGNOSIS — Z13.220 ENCOUNTER FOR LIPID SCREENING FOR CARDIOVASCULAR DISEASE: ICD-10-CM

## 2022-11-28 LAB
25(OH)D3 SERPL-MCNC: 34 NG/ML (ref 30–100)
ALBUMIN SERPL BCP-MCNC: 4.4 G/DL (ref 3.2–4.9)
ALBUMIN/GLOB SERPL: 1.9 G/DL
ALP SERPL-CCNC: 77 U/L (ref 30–99)
ALT SERPL-CCNC: 28 U/L (ref 2–50)
ANION GAP SERPL CALC-SCNC: 8 MMOL/L (ref 7–16)
AST SERPL-CCNC: 24 U/L (ref 12–45)
BASOPHILS # BLD AUTO: 0.6 % (ref 0–1.8)
BASOPHILS # BLD: 0.04 K/UL (ref 0–0.12)
BILIRUB SERPL-MCNC: 1.1 MG/DL (ref 0.1–1.5)
BUN SERPL-MCNC: 21 MG/DL (ref 8–22)
CALCIUM SERPL-MCNC: 9.2 MG/DL (ref 8.5–10.5)
CHLORIDE SERPL-SCNC: 106 MMOL/L (ref 96–112)
CHOLEST SERPL-MCNC: 165 MG/DL (ref 100–199)
CO2 SERPL-SCNC: 25 MMOL/L (ref 20–33)
CREAT SERPL-MCNC: 0.65 MG/DL (ref 0.5–1.4)
EOSINOPHIL # BLD AUTO: 0.12 K/UL (ref 0–0.51)
EOSINOPHIL NFR BLD: 1.8 % (ref 0–6.9)
ERYTHROCYTE [DISTWIDTH] IN BLOOD BY AUTOMATED COUNT: 45.1 FL (ref 35.9–50)
EST. AVERAGE GLUCOSE BLD GHB EST-MCNC: 91 MG/DL
FASTING STATUS PATIENT QL REPORTED: NORMAL
GFR SERPLBLD CREATININE-BSD FMLA CKD-EPI: 92 ML/MIN/1.73 M 2
GLOBULIN SER CALC-MCNC: 2.3 G/DL (ref 1.9–3.5)
GLUCOSE SERPL-MCNC: 96 MG/DL (ref 65–99)
HBA1C MFR BLD: 4.8 % (ref 4–5.6)
HCT VFR BLD AUTO: 39.8 % (ref 37–47)
HDLC SERPL-MCNC: 47 MG/DL
HGB BLD-MCNC: 13.1 G/DL (ref 12–16)
IMM GRANULOCYTES # BLD AUTO: 0.05 K/UL (ref 0–0.11)
IMM GRANULOCYTES NFR BLD AUTO: 0.7 % (ref 0–0.9)
LDLC SERPL CALC-MCNC: 93 MG/DL
LYMPHOCYTES # BLD AUTO: 1.65 K/UL (ref 1–4.8)
LYMPHOCYTES NFR BLD: 24.5 % (ref 22–41)
MCH RBC QN AUTO: 29.2 PG (ref 27–33)
MCHC RBC AUTO-ENTMCNC: 32.9 G/DL (ref 33.6–35)
MCV RBC AUTO: 88.6 FL (ref 81.4–97.8)
MONOCYTES # BLD AUTO: 0.42 K/UL (ref 0–0.85)
MONOCYTES NFR BLD AUTO: 6.2 % (ref 0–13.4)
NEUTROPHILS # BLD AUTO: 4.46 K/UL (ref 2–7.15)
NEUTROPHILS NFR BLD: 66.2 % (ref 44–72)
NRBC # BLD AUTO: 0 K/UL
NRBC BLD-RTO: 0 /100 WBC
PLATELET # BLD AUTO: 269 K/UL (ref 164–446)
PMV BLD AUTO: 9.8 FL (ref 9–12.9)
POTASSIUM SERPL-SCNC: 4.8 MMOL/L (ref 3.6–5.5)
PROT SERPL-MCNC: 6.7 G/DL (ref 6–8.2)
RBC # BLD AUTO: 4.49 M/UL (ref 4.2–5.4)
SODIUM SERPL-SCNC: 139 MMOL/L (ref 135–145)
T4 FREE SERPL-MCNC: 1.13 NG/DL (ref 0.93–1.7)
TRIGL SERPL-MCNC: 127 MG/DL (ref 0–149)
TSH SERPL DL<=0.005 MIU/L-ACNC: 3.63 UIU/ML (ref 0.38–5.33)
VIT B12 SERPL-MCNC: 906 PG/ML (ref 211–911)
WBC # BLD AUTO: 6.7 K/UL (ref 4.8–10.8)

## 2022-11-28 PROCEDURE — 80061 LIPID PANEL: CPT

## 2022-11-28 PROCEDURE — 85025 COMPLETE CBC W/AUTO DIFF WBC: CPT

## 2022-11-28 PROCEDURE — 84439 ASSAY OF FREE THYROXINE: CPT

## 2022-11-28 PROCEDURE — 82306 VITAMIN D 25 HYDROXY: CPT

## 2022-11-28 PROCEDURE — 82607 VITAMIN B-12: CPT

## 2022-11-28 PROCEDURE — 80053 COMPREHEN METABOLIC PANEL: CPT

## 2022-11-28 PROCEDURE — 36415 COLL VENOUS BLD VENIPUNCTURE: CPT

## 2022-11-28 PROCEDURE — 84443 ASSAY THYROID STIM HORMONE: CPT

## 2022-11-28 PROCEDURE — 83036 HEMOGLOBIN GLYCOSYLATED A1C: CPT

## 2022-12-19 ENCOUNTER — OFFICE VISIT (OUTPATIENT)
Dept: SLEEP MEDICINE | Facility: MEDICAL CENTER | Age: 77
End: 2022-12-19
Payer: MEDICARE

## 2022-12-19 VITALS
RESPIRATION RATE: 16 BRPM | HEIGHT: 63 IN | HEART RATE: 82 BPM | WEIGHT: 247 LBS | DIASTOLIC BLOOD PRESSURE: 76 MMHG | BODY MASS INDEX: 43.77 KG/M2 | OXYGEN SATURATION: 97 % | SYSTOLIC BLOOD PRESSURE: 128 MMHG

## 2022-12-19 DIAGNOSIS — Z79.01 ANTICOAGULATED: ICD-10-CM

## 2022-12-19 DIAGNOSIS — G47.33 OSA (OBSTRUCTIVE SLEEP APNEA): ICD-10-CM

## 2022-12-19 DIAGNOSIS — I27.20 PULMONARY HYPERTENSION (HCC): ICD-10-CM

## 2022-12-19 DIAGNOSIS — I10 ESSENTIAL HYPERTENSION: ICD-10-CM

## 2022-12-19 DIAGNOSIS — I48.0 PAROXYSMAL ATRIAL FIBRILLATION (HCC): ICD-10-CM

## 2022-12-19 PROCEDURE — 99213 OFFICE O/P EST LOW 20 MIN: CPT | Performed by: NURSE PRACTITIONER

## 2022-12-19 ASSESSMENT — FIBROSIS 4 INDEX: FIB4 SCORE: 1.26

## 2022-12-19 NOTE — PROGRESS NOTES
Chief Complaint   Patient presents with    Follow-Up     Last seen 10/8/2021        HPI:      Mrs. Emmerling is a 76 y/o female patient who is in today for KOFI f/u. PMH includes Afib is Eliquis, HTN, pulmonary hypertension, KOFI, obesity, binge eating, chronic fatigue, former smoker.    Patient was last seen in October 2021 and states she did not follow-up as she did not want to be put on a recalled machine.  She goes to bed at 7 pm and wakes up at 3:30 am. She reports occasional morning headache, but continuous snoring. Generally she sleeps well through the night, but does have some nights where she has trouble shutting off her brain.  She was again encouraged by her primary care physician to follow-up with sleep medicine as she recently had an exacerbation of A. fib.  She continues to take Eliquis.  She does not have cardiology for management of A. fib but does follow-up with her PCP.  She has not been very active due to twisting her right knee but hopes to resume her daily walking that she is to do for least 30 minutes.  She has noted some weight gain and today's weight is 247 pounds which is up from 232 pounds from last office visit in August 2021.      Sleep/Card Study History:   PSG study from 9/17/21 indicated mild OAS with AHI of 8.1/hr and O2 loretta 78 %. AHI during rem was 30.7 and AHI while supine was 8.09. There was a mean oxygen saturation of 92.0% with a minimum oxygen saturation of 78.0%. Time spent during sleep with oxygen saturations below 89% was 22.0 minutes. The patient spent 0.5 minutes of TST with SaO2 <88%.     Echo from 9/16/21 indicated Normal left ventricular systolic function. The left ventricular ejection fraction is visually estimated to be 70%. Normal right ventricular size and systolic function. Trace tricuspid regurgitation. Estimated right ventricular systolic pressure is 30 mmHg. Normal left atrial size. Trace mitral regurgitation.    ROS:    Constitutional: Denies fevers, Denies  weight changes  Eyes: Denies changes in vision, no eye pain  Ears/Nose/Throat/Mouth: Denies nasal congestion or sore throat   Cardiovascular: Denies chest pain or palpitations   Respiratory: Denies shortness of breath , Denies cough  Gastrointestinal/Hepatic: Denies abdominal pain, nausea, vomiting,   Skin/Breast: Denies rash,   Neurological: + headache, denies confusion,   Psychiatric: denies mood disorder   Sleep: + snoring       Past Medical History:   Diagnosis Date    Breast lump     lumpectomy    Chickenpox     Welsh measles     Low HDL (under 40) 10/15/2018      Ref. Range 6/25/2021 08:25  Cholesterol,Tot Latest Ref Range: 100 - 199 mg/dL 154  Triglycerides Latest Ref Range: 0 - 149 mg/dL 98  HDL Latest Ref Range: >=40 mg/dL 38 (A)  LDL Latest Ref Range: <100 mg/dL 96   She has history of mild LDL elevation.  She had an abnormal EKG since 2013 with Q waves in anterior leads suggestive of prior anterior infarct.  Nuclear stress test in 2013 did not show        Past Surgical History:   Procedure Laterality Date    ANKLE FUSION      CHOLECYSTECTOMY         Family History   Problem Relation Age of Onset    Cancer Mother         Uterine/Lymphnode/Metastisized    Heart Disease Sister     Cancer Sister         Uterian    Breast Cancer Maternal Aunt     Cancer Sister         Skin    Other Sister         something eating her from inside out/rotting skin    Suicide Attempts Father     No Known Problems Brother     No Known Problems Brother     Heart Disease Daughter         HO Congenital heart disease    Other Daughter         epilepsy       Social History     Socioeconomic History    Marital status: Single     Spouse name: Not on file    Number of children: Not on file    Years of education: Not on file    Highest education level: Not on file   Occupational History    Not on file   Tobacco Use    Smoking status: Former     Packs/day: 2.00     Years: 32.00     Pack years: 64.00     Types: Cigarettes     Quit date:  1998     Years since quittin.5    Smokeless tobacco: Never    Tobacco comments:     Started at 20   Vaping Use    Vaping Use: Never used   Substance and Sexual Activity    Alcohol use: Yes     Alcohol/week: 0.0 oz     Comment: 1 beer or a glass of wine per month    Drug use: No    Sexual activity: Never     Partners: Male   Other Topics Concern    Not on file   Social History Narrative    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[employer]     Social Determinants of Health     Financial Resource Strain: Not on file   Food Insecurity: Not on file   Transportation Needs: Not on file   Physical Activity: Not on file   Stress: Not on file   Social Connections: Not on file   Intimate Partner Violence: Not on file   Housing Stability: Not on file       Allergies as of 2022 - Reviewed 2022   Allergen Reaction Noted    Demerol Vomiting 2013        Vitals:  Vitals:    22 0814   BP: 128/76   Pulse: 82   Resp: 16   SpO2: 97%       Current medications as of today   Current Outpatient Medications   Medication Sig Dispense Refill    FIBER PO Take  by mouth. 2 gummies daily      COLLAGEN PO Take  by mouth. 1 scoop powder daily      MULTIPLE VITAMIN PO Take 1 Tablet by mouth every day.      Multiple Vitamins-Minerals (PRESERVISION AREDS 2 PO) Take 2 Tablets by mouth every day.      apixaban (ELIQUIS) 5mg Tab Take 1 Tablet by mouth 2 times a day. 180 Tablet 3    Misc Natural Products (FOCUSED MIND PO) Take 2 Capsules by mouth every day.      SUPER B COMPLEX/C PO Take 1 capsule by mouth every day.      MAGNESIUM PO Take 1 Tablet by mouth. Unable to recall dose      vitamin D (CHOLECALCIFEROL) 1000 UNIT Tab Take 1,000 Units by mouth every day.      POTASSIUM PO Take 1 Tablet by mouth every day. Unable to recall dose       No current facility-administered medications for this visit.         Physical Exam: Limited by COVID-19 precautions.  Appearance: Well developed, obese, no acute distress  Eyes: PERRL, EOM intact, sclera  white, conjunctiva moist  Ears: no lesions or deformities  Hearing: grossly intact  Nose: no lesions or deformities  Respiratory effort: no intercostal retractions or use of accessory muscles  Extremities: no cyanosis or edema  Abdomen: soft, large  Gait and Station: normal  Digits and nails: no clubbing, cyanosis, petechiae or nodes.  Cranial nerves: grossly intact  Skin: no visible rashes, lesions or ulcers noted  Orientation: Oriented to time, person and place  Mood and affect: mood and affect appropriate, normal interaction with examiner  Judgement: Intact    Assessment:  1. KOFI (obstructive sleep apnea)  Overnight Home Sleep Study      2. Paroxysmal atrial fibrillation (HCC)        3. Anticoagulated        4. Pulmonary hypertension ([facility]) RVSP 30        5. Essential hypertension        6. BMI 40.0-44.9, adult ([facility])  Patient identified as having weight management issue.  Appropriate orders and counseling given.            Plan  Discussed the cardiovascular and neuropsychiatric risks of untreated KOFI; including but not limited to: HTN, DM, MI, ASCVD, CVA, CHF, traffic accidents.     1. PSG study from 9/17/21 indicated mild OAS with AHI of 8.1/hr and O2 loretta 78 %. AHI during rem was 30.7 and AHI while supine was 8.09. There was a mean oxygen saturation of 92.0% with a minimum oxygen saturation of 78.0%. Time spent during sleep with oxygen saturations below 89% was 22.0 minutes. The patient spent 0.5 minutes of TST with SaO2 <88%.     *Auto CPAP was recommended at last office visit however the patient did not follow-up due to concern over [employer] recall of CPAP machines.  As the sleep study is over a-year-old patient will need to repeat testing to which she is amenable to.  Order home sleep study to reassess KOFI.  We will review sleep study results in person.    *Patient was provided with a work note for today's office visit at her request.     *Sleep hygiene discussed. Recommend keeping a set  sleep/wake schedule. Logging enough hours of sleep. Limiting/Avoiding naps. No caffeine after noon and no heavy meals in the evening.     3-5. Continue to f/u with PCP.  Is on Eliquis.  6.  Encouraged a healthy diet and exercise to help with weight loss and management.   If your BMI is 25-29.9 you are overweight. If your BMI is 30 or greater you are obese. To lose weight eat less, move more, or both. Any diet that reduces caloric intake can help with weight loss. Extra weight may reduce your lifespan. Avoid dramatic unsustainable dietary changes that result in the yo-yo effect (down then back up.)  Usually small modifications in diet and exercise are easier to stick with.  7. Follow up with appropriate healthcare providers for all other medical problems.  8. F/u in 5 weeks for KOFI, sleep study results.        KATINA Abreu.      This dictation was created using voice recognition software. The accuracy of the dictation is limited to the abilities of the software. I expect there may be some errors of grammar and possibly content.

## 2022-12-19 NOTE — LETTER
December 19, 2022         Patient: Ruby Madgline Emmerling   YOB: 1945   Date of Visit: 12/19/2022           To Whom it May Concern:    Ruby Emmerling was seen in my clinic on 12/19/2022. She may return to work on 12/19/2022.    If you have any questions or concerns, please don't hesitate to call.        Sincerely,           KATINA Abreu.  Electronically Signed

## 2023-01-17 ENCOUNTER — APPOINTMENT (OUTPATIENT)
Dept: SLEEP MEDICINE | Facility: MEDICAL CENTER | Age: 78
End: 2023-01-17
Attending: NURSE PRACTITIONER
Payer: MEDICARE

## 2023-01-23 DIAGNOSIS — D68.69 SECONDARY HYPERCOAGULABLE STATE (HCC): ICD-10-CM

## 2023-01-23 DIAGNOSIS — I48.0 PAROXYSMAL ATRIAL FIBRILLATION (HCC): ICD-10-CM

## 2023-01-23 RX ORDER — APIXABAN 5 MG/1
TABLET, FILM COATED ORAL
Qty: 180 TABLET | Refills: 3 | Status: SHIPPED | OUTPATIENT
Start: 2023-01-23 | End: 2024-02-05

## 2023-01-23 NOTE — TELEPHONE ENCOUNTER
Received request via: Pharmacy    Was the patient seen in the last year in this department? Yes    Does the patient have an active prescription (recently filled or refills available) for medication(s) requested? No    Does the patient have long term Plus and need 100 day supply (blood pressure, diabetes and cholesterol meds only)? Yes, quantity updated to 100 days

## 2023-02-06 ENCOUNTER — HOME STUDY (OUTPATIENT)
Dept: SLEEP MEDICINE | Facility: MEDICAL CENTER | Age: 78
End: 2023-02-06
Attending: NURSE PRACTITIONER
Payer: MEDICARE

## 2023-02-06 DIAGNOSIS — G47.33 OSA (OBSTRUCTIVE SLEEP APNEA): ICD-10-CM

## 2023-02-06 PROCEDURE — 95800 SLP STDY UNATTENDED: CPT | Performed by: STUDENT IN AN ORGANIZED HEALTH CARE EDUCATION/TRAINING PROGRAM

## 2023-02-08 NOTE — PROCEDURES
DIAGNOSTIC HOME SLEEP TEST (HST) REPORT WatchPAT      PATIENT ID:  NAME:  Ruby Emmerling  MRN:               [id number]  YOB: 1945  DATE OF STUDY: 02/06/2023      Impression:     This study shows evidence of:      1. Mild obstructive sleep apnea with 4% PAT apnea hyponea index(pAHI) of 9.0 per hour.  PAT respiratory disturbance index (pRDI) was 27.3 per hour. These findings are based on 7 channels recording of PAT signal with sleep staging, heart rate, pulse oximetry, actigraphy, body position, snoring and respiratory movement.     2. Oxygenation O2 Sat. mean O2 sat was 94%,  loretta was 83%,  and maximum O2 at 99 %. O2 sat was at or  below 88% for 3.1 min of evaluation time. Oxygen Desaturation (>=4%) Index was elevated at 7.9/hr. AVG HR was 69 BPM.      TECHNICAL DESCRIPTION: Patient underwent home sleep apnea testing with peripheral arterial tone signal (WatchPAT™). Monitoring was done with 7 channels recording of PAT signal with sleep staging, heart rate, pulse oximetry, actigraphy, body position, snoring and respiratory movement. Prior to using the device, the patient received verbal and written instructions for its application and was provided with the help desk phone number for additional telephonic instruction with 24-hour availability of qualified personnel to answer questions.    Respiratory events:    pRDI: Total 204 (REM index 54.3/hr. NREM index 21.5/hr, All Night 27.3/hr)    3% pAHIc: Total 0 ( All Night 0/hr)    4% pAHI: Total 67 (All Night 9.0/hr)    General sleep summary: . Total recording time is 8 hours and 27 minutes and total Sleep time is 7 hours and 36 minutes. The patient spent 455 minutes in the supine position and 0 minutes in the nonsupine position.      Recommendations:    1. CPAP titration study vs Auto CPAP trial .   2.   In general patients with sleep apnea are advised to avoid alcohol and sedatives and to not operate a motor vehicle while drowsy. In some cases  alternative treatment options may prove effective in resolving sleep apnea in these options include upper airway surgery, the use of a dental orthotic or weight loss and positional therapy. Clinical correlation is required.         Deejay Garibay MD

## 2023-02-15 ENCOUNTER — OFFICE VISIT (OUTPATIENT)
Dept: SLEEP MEDICINE | Facility: MEDICAL CENTER | Age: 78
End: 2023-02-15
Payer: MEDICARE

## 2023-02-15 VITALS
DIASTOLIC BLOOD PRESSURE: 62 MMHG | SYSTOLIC BLOOD PRESSURE: 138 MMHG | HEIGHT: 63 IN | WEIGHT: 245 LBS | HEART RATE: 64 BPM | OXYGEN SATURATION: 97 % | BODY MASS INDEX: 43.41 KG/M2

## 2023-02-15 DIAGNOSIS — Z79.01 ANTICOAGULATED: ICD-10-CM

## 2023-02-15 DIAGNOSIS — E66.01 MORBID (SEVERE) OBESITY DUE TO EXCESS CALORIES (HCC): ICD-10-CM

## 2023-02-15 DIAGNOSIS — I10 ESSENTIAL HYPERTENSION: ICD-10-CM

## 2023-02-15 DIAGNOSIS — G47.33 OSA (OBSTRUCTIVE SLEEP APNEA): ICD-10-CM

## 2023-02-15 DIAGNOSIS — I48.0 PAROXYSMAL ATRIAL FIBRILLATION (HCC): ICD-10-CM

## 2023-02-15 DIAGNOSIS — I27.20 PULMONARY HYPERTENSION (HCC): ICD-10-CM

## 2023-02-15 PROCEDURE — 99213 OFFICE O/P EST LOW 20 MIN: CPT | Performed by: NURSE PRACTITIONER

## 2023-02-15 ASSESSMENT — PATIENT HEALTH QUESTIONNAIRE - PHQ9: CLINICAL INTERPRETATION OF PHQ2 SCORE: 0

## 2023-02-15 ASSESSMENT — FIBROSIS 4 INDEX: FIB4 SCORE: 1.26

## 2023-02-15 NOTE — LETTER
[place] [facility] PULMONARY MEDICINE  [address] [facility] [address]     February 15, 2023    Patient: Ruby Madgline Emmerling   YOB: 1945   Date of Visit: 2/15/2023       To Whom It May Concern:    Ruby Emmerling was seen and treated in our department on 2/15/2023.     Sincerely,   Jolene Peterson

## 2023-02-15 NOTE — PROGRESS NOTES
Chief Complaint   Patient presents with    Follow-Up     Last seen 12/19/22 with Jolene Peterson     Results     Sleep Study 2/6/23       HPI:      Mrs. Emmerling is a 76 y/o female patient who is in today for KOFI f/u and sleep study results. PMH includes Afib is Eliquis, HTN, pulmonary hypertension, KOFI, obesity, binge eating, chronic fatigue, former smoker.    She goes to bed at 7 pm and wakes up at 3:30 am. She reports occasional morning headache, but continuous snoring.  Generally she sleeps well through the night, but does have some nights where she has trouble shutting off her brain. She does not have cardiology for management of A. fib but does follow-up with her PCP.  She continues to take Eliquis.  She has not been very active due to twisting her right knee but hopes to resume her daily walking that she is to do for least 30 minutes.     Sleep/Card Study History:   HSS from 2/6/23 indicated mild obstructive sleep apnea with 4% PAT apnea hyponea index(pAHI) of 9.0 per hour. PAT respiratory disturbance index (pRDI) was 27.3 per hour. Oxygenation O2 Sat. mean O2 sat was 94%,  loretta was 83%,  and maximum O2 at 99 %. O2 sat was at or  below 88% for 3.1 min of evaluation time. Oxygen Desaturation (>=4%) Index was elevated at 7.9/hr. AVG HR was 69 BPM.    PSG study from 9/17/21 indicated mild OAS with AHI of 8.1/hr and O2 loretta 78 %. AHI during rem was 30.7 and AHI while supine was 8.09. There was a mean oxygen saturation of 92.0% with a minimum oxygen saturation of 78.0%. Time spent during sleep with oxygen saturations below 89% was 22.0 minutes. The patient spent 0.5 minutes of TST with SaO2 <88%.     Echo from 9/16/21 indicated Normal left ventricular systolic function. The left ventricular ejection fraction is visually estimated to be 70%. Normal right ventricular size and systolic function. Trace tricuspid regurgitation. Estimated right ventricular systolic pressure is 30 mmHg. Normal left atrial size. Trace  mitral regurgitation.       ROS:    Constitutional: Denies fevers, Denies weight changes  Eyes: Denies changes in vision, no eye pain  Ears/Nose/Throat/Mouth: Denies nasal congestion or sore throat   Cardiovascular: Denies chest pain or palpitations   Respiratory: Denies shortness of breath , Denies cough  Gastrointestinal/Hepatic: Denies abdominal pain, nausea, vomiting,   Skin/Breast: Denies rash,   Neurological: Denies headache, confusion,   Psychiatric: denies mood disorder   Sleep: + snoring, + intermittent morning headache      Past Medical History:   Diagnosis Date    Breast lump     lumpectomy    Chickenpox     [place] measles     Low HDL (under 40) 10/15/2018      Ref. Range 6/25/2021 08:25  Cholesterol,Tot Latest Ref Range: 100 - 199 mg/dL 154  Triglycerides Latest Ref Range: 0 - 149 mg/dL 98  HDL Latest Ref Range: >=40 mg/dL 38 (A)  LDL Latest Ref Range: <100 mg/dL 96   She has history of mild LDL elevation.  She had an abnormal EKG since 2013 with Q waves in anterior leads suggestive of prior anterior infarct.  Nuclear stress test in 2013 did not show        Past Surgical History:   Procedure Laterality Date    ANKLE FUSION      CHOLECYSTECTOMY         Family History   Problem Relation Age of Onset    Cancer Mother         Uterine/Lymphnode/Metastisized    Heart Disease Sister     Cancer Sister         Uterian    Breast Cancer Maternal Aunt     Cancer Sister         Skin    Other Sister         something eating her from inside out/rotting skin    Suicide Attempts Father     No Known Problems Brother     No Known Problems Brother     Heart Disease Daughter         HO Congenital heart disease    Other Daughter         epilepsy       Social History     Socioeconomic History    Marital status: Single     Spouse name: Not on file    Number of children: Not on file    Years of education: Not on file    Highest education level: Not on file   Occupational History    Not on file   Tobacco Use    Smoking status:  Former     Packs/day: 2.00     Years: 32.00     Pack years: 64.00     Types: Cigarettes     Quit date: 1998     Years since quittin.6    Smokeless tobacco: Never    Tobacco comments:     Started at 20   Vaping Use    Vaping Use: Never used   Substance and Sexual Activity    Alcohol use: Yes     Alcohol/week: 0.0 oz     Comment: 1 beer or a glass of wine per month    Drug use: No    Sexual activity: Never     Partners: Male   Other Topics Concern    Not on file   Social History Narrative    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[employer]     Social Determinants of Health     Financial Resource Strain: Not on file   Food Insecurity: Not on file   Transportation Needs: Not on file   Physical Activity: Not on file   Stress: Not on file   Social Connections: Not on file   Intimate Partner Violence: Not on file   Housing Stability: Not on file       Allergies as of 02/15/2023 - Reviewed 02/15/2023   Allergen Reaction Noted    Demerol Vomiting 2013        Vitals:  Vitals:    02/15/23 0818   BP: 138/62   Pulse: 64   SpO2: 97%       Current medications as of today   Current Outpatient Medications   Medication Sig Dispense Refill    ELIQUIS 5 MG Tab TAKE 1 TABLET BY MOUTH TWICE A  Tablet 3    FIBER PO Take  by mouth. 2 gummies daily      COLLAGEN PO Take  by mouth. 1 scoop powder daily      MULTIPLE VITAMIN PO Take 1 Tablet by mouth every day.      Multiple Vitamins-Minerals (PRESERVISION AREDS 2 PO) Take 2 Tablets by mouth every day.      Misc Natural Products (FOCUSED MIND PO) Take 2 Capsules by mouth every day.      SUPER B COMPLEX/C PO Take 1 capsule by mouth every day.      MAGNESIUM PO Take 1 Tablet by mouth. Unable to recall dose      vitamin D (CHOLECALCIFEROL) 1000 UNIT Tab Take 1,000 Units by mouth every day.      POTASSIUM PO Take 1 Tablet by mouth every day. Unable to recall dose       No current facility-administered medications for this visit.         Physical Exam: Limited by COVID-19  precautions.  Appearance: Well developed, obese, no acute distress  Eyes: PERRL, EOM intact, sclera white, conjunctiva moist  Ears: no lesions or deformities  Hearing: grossly intact  Nose: no lesions or deformities  Respiratory effort: no intercostal retractions or use of accessory muscles  Extremities: no cyanosis or edema  Abdomen: soft, large  Gait and Station: normal  Digits and nails: no clubbing, cyanosis, petechiae or nodes.  Cranial nerves: grossly intact  Skin: no visible rashes, lesions or ulcers noted  Orientation: Oriented to time, person and place  Mood and affect: mood and affect appropriate, normal interaction with examiner  Judgement: Intact    Assessment:  1. KOFI (obstructive sleep apnea)  DME CPAP      2. Paroxysmal atrial fibrillation (HCC)        3. Anticoagulated        4. Pulmonary hypertension (HCC) RVSP 30        5. Essential hypertension        6. Morbid (severe) obesity due to excess calories (HCC)        7. Body mass index (BMI) 40.0-44.9, adult ([facility])  Patient identified as having weight management issue.  Appropriate orders and counseling given.            Plan  Discussed the cardiovascular and neuropsychiatric risks of untreated KOFI; including but not limited to: HTN, DM, MI, ASCVD, CVA, CHF, traffic accidents.     1.HSS from 2/6/23 indicated mild obstructive sleep apnea with 4% PAT apnea hyponea index(pAHI) of 9.0 per hour. PAT respiratory disturbance index (pRDI) was 27.3 per hour. Oxygenation O2 Sat. mean O2 sat was 94%,  loretta was 83%,  and maximum O2 at 99 %. O2 sat was at or  below 88% for 3.1 min of evaluation time. Oxygen Desaturation (>=4%) Index was elevated at 7.9/hr. AVG HR was 69 BPM.  Recommendation:  After review auto CPAP will be initiated.  Other treatment options including mandibular advancement device, UPPP, positional and weight loss therapy were also reviewed with the patient today.  Patient is also advised to avoid the supine position, alcohol 4 hours prior to  bedtime, sedatives and opioids as all can exacerbate apnea.    *Patient is amenable to CPAP therapy.  DME order ([facility] in [place]) for [employer] autoCPAP 5-15 cmH2O, mask (MOC) and supplies was placed today. Clean mask and supplies weekly with soap and water, and change supplies per insurance guidelines. Advised patient to use the CPAP every night for more than four hours for optimal health benefit and to meet the health insurance 70% compliance guideline. Advised patient he may change the mask out if needed within the first 30 days after he receives the equipment.     *Sleep hygiene discussed. Recommend keeping a set sleep/wake schedule. Logging enough hours of sleep. Limiting/Avoiding naps. No caffeine after noon and no heavy meals in the evening.    2-5. Continue to f/u with PCP.  Blood pressure was rechecked and was 138/62.  6-7. Encouraged a healthy diet and exercise to help with weight loss and management.  Today's body weight is 245 pounds with a BMI of 43.40.  If your BMI is 25-29.9 you are overweight. If your BMI is 30 or greater you are obese. To lose weight eat less, move more, or both. Any diet that reduces caloric intake can help with weight loss. Extra weight may reduce your lifespan. Avoid dramatic unsustainable dietary changes that result in the yo-yo effect (down then back up.)  Usually small modifications in diet and exercise are easier to stick with.   8. Follow up with appropriate healthcare providers for all other medical problems.  9. F/u in 4 months for first compliance, KOFI.       KATINA Abreu.      This dictation was created using voice recognition software. The accuracy of the dictation is limited to the abilities of the software. I expect there may be some errors of grammar and possibly content.

## 2023-02-28 ENCOUNTER — APPOINTMENT (OUTPATIENT)
Dept: MEDICAL GROUP | Facility: PHYSICIAN GROUP | Age: 78
End: 2023-02-28
Payer: MEDICARE

## 2023-03-30 ENCOUNTER — OFFICE VISIT (OUTPATIENT)
Dept: MEDICAL GROUP | Facility: PHYSICIAN GROUP | Age: 78
End: 2023-03-30
Payer: MEDICARE

## 2023-03-30 VITALS
DIASTOLIC BLOOD PRESSURE: 70 MMHG | RESPIRATION RATE: 16 BRPM | BODY MASS INDEX: 43.23 KG/M2 | OXYGEN SATURATION: 98 % | WEIGHT: 244 LBS | HEIGHT: 63 IN | HEART RATE: 71 BPM | TEMPERATURE: 97 F | SYSTOLIC BLOOD PRESSURE: 120 MMHG

## 2023-03-30 DIAGNOSIS — Z13.220 ENCOUNTER FOR LIPID SCREENING FOR CARDIOVASCULAR DISEASE: ICD-10-CM

## 2023-03-30 DIAGNOSIS — I10 ESSENTIAL HYPERTENSION: ICD-10-CM

## 2023-03-30 DIAGNOSIS — Z13.6 ENCOUNTER FOR LIPID SCREENING FOR CARDIOVASCULAR DISEASE: ICD-10-CM

## 2023-03-30 DIAGNOSIS — D68.69 SECONDARY HYPERCOAGULABLE STATE (HCC): ICD-10-CM

## 2023-03-30 DIAGNOSIS — I27.20 PULMONARY HYPERTENSION (HCC): ICD-10-CM

## 2023-03-30 DIAGNOSIS — I48.0 PAROXYSMAL ATRIAL FIBRILLATION (HCC): ICD-10-CM

## 2023-03-30 PROCEDURE — 99214 OFFICE O/P EST MOD 30 MIN: CPT | Performed by: INTERNAL MEDICINE

## 2023-03-30 ASSESSMENT — FIBROSIS 4 INDEX: FIB4 SCORE: 1.26

## 2023-03-30 NOTE — PATIENT INSTRUCTIONS
[facility] - [facility] [place]  [address]  [place] [address]  Phone: [phone]

## 2023-03-30 NOTE — ASSESSMENT & PLAN NOTE
Chronic stable problem, doing well off pharmacotherapy for the blood pressure, continue with regular assessment to ensure stability.

## 2023-03-30 NOTE — PROGRESS NOTES
Subjective:   Chief Complaint/History of Present Illness:  Ruby Madgline Emmerling is a 77 y.o. female established patient who presents today to discuss medical problems as listed below. Luda is unaccompanied for today's visit.    Problem   Pulmonary hypertension (HCC) RVSP 30    On echocardiogram at time of atrial fibrillation diagnosis she was noted to have mild pulmonary hypertension with RVSP of 30.  This is likely related to untreated sleep apnea and history of hypertension.     Secondary Hypercoagulable State (Hcc)    She has paroxysmal atrial fibrillation.  Recommended to go on systemic anticoagulation however she is happening pretty coverages not affordable.  She will plan to have coverage as of January 2022.  She continues on aspirin 81 mg in the meantime.    Initial [id number] score was 4 (history of MI, hypertension, age, gender) however stress test and echocardiogram did not show any evidence of prior MI and her blood pressure is controlled without pharmacotherapy so at this moment is closer to 2.    Current regimen: Eliquis 5 mg twice daily     Paroxysmal Atrial Fibrillation (Hcc)    She presents to clinic to review concerns over new onset atrial fibrillation.  She has an apple smart watch and on 2 occasions has been alerted of irregular rhythm.  These events were on April 25 and Marilee 10 and again in September while she was sleeping.  Normally occurs while she is supine in bed resting.  Heart rate goes as high as the mid 140s.  She is symptomatic with feeling restless.  No personal history and no prior evaluation.     She has history of morbid obesity, evaluation for sleep apnea was positive and she is working with sleep medicine on positive pressure therapy.  Former smoker.  She has moderate alcohol consumption.  She had a reassuring stress test in 2013 but no recent cardiac evaluation.  No other history of lung disease. Lab work negative for thyroid disease, anemia, or electrolyte abnormality.    On  "follow-up in March 2023 she reports more symptomatic palpitations with heart rate up to the 140s.  She is not sure if she would want to do an ablation but may be open to medications if needed.  She is hoping the positive pressure therapy for her sleep apnea will help reduce these episodes but if not may reach out to me for a referral to electrophysiology.     Essential Hypertension    She was diagnosed with elevated blood pressure in 2013.  She is not using pharmacotherapy at this time.      Blood pressure in clinic 120/70    EKG shows previous anterior infarct since 2013 with reassuring nuclear stress test completed at that time.              Current Medications:  Current Outpatient Medications Ordered in Epic   Medication Sig Dispense Refill    ELIQUIS 5 MG Tab TAKE 1 TABLET BY MOUTH TWICE A  Tablet 3    FIBER PO Take  by mouth. 2 gummies daily      COLLAGEN PO Take  by mouth. 1 scoop powder daily      MULTIPLE VITAMIN PO Take 1 Tablet by mouth every day.      Multiple Vitamins-Minerals (PRESERVISION AREDS 2 PO) Take 2 Tablets by mouth every day.      Misc Natural Products (FOCUSED MIND PO) Take 2 Capsules by mouth every day.      SUPER B COMPLEX/C PO Take 1 capsule by mouth every day.      MAGNESIUM PO Take 1 Tablet by mouth. Unable to recall dose      vitamin D (CHOLECALCIFEROL) 1000 UNIT Tab Take 1,000 Units by mouth every day.      POTASSIUM PO Take 1 Tablet by mouth every day. Unable to recall dose       No current Epic-ordered facility-administered medications on file.          Objective:   Physical Exam:    Vitals: /70 (BP Location: Right arm, Patient Position: Sitting, BP Cuff Size: Adult)   Pulse 71   Temp 36.1 °C (97 °F) (Temporal)   Resp 16   Ht 1.6 m (5' 3\")   Wt 111 kg (244 lb)   SpO2 98%    BMI: Body mass index is 43.22 kg/m².  Physical Exam  Constitutional:       General: She is not in acute distress.     Appearance: Normal appearance. She is not ill-appearing.   HENT:      Right " Ear: Ear canal and external ear normal. There is no impacted cerumen.      Left Ear: Ear canal and external ear normal. There is no impacted cerumen.   Eyes:      General: No scleral icterus.     Conjunctiva/sclera: Conjunctivae normal.   Cardiovascular:      Rate and Rhythm: Normal rate and regular rhythm.      Pulses: Normal pulses.   Pulmonary:      Effort: Pulmonary effort is normal. No respiratory distress.      Breath sounds: No wheezing, rhonchi or rales.   Musculoskeletal:      Comments: Nonpitting lymphedema in b/l LE, pretibial. Trigger finger left 4th digit.   Skin:     General: Skin is warm and dry.      Findings: No rash.   Neurological:      Gait: Gait normal.   Psychiatric:         Mood and Affect: Mood normal.         Behavior: Behavior normal.         Thought Content: Thought content normal.         Judgment: Judgment normal.             Assessment and Plan:   Luda is a 77 y.o. female with the following:  Problem List Items Addressed This Visit       Essential hypertension     Chronic stable problem, doing well off pharmacotherapy for the blood pressure, continue with regular assessment to ensure stability.         Paroxysmal atrial fibrillation (HCC)     Chronic ongoing problem, still having a few episodes of atrial fibrillation per month, more symptomatic with the episode last week.  She is still adjusting to her CPAP machine in hopes that the episodes will diminish as she continues to get used to this.  She continues to have recurrent palpitations and would be happy to refer her to electrophysiology to discuss medical versus procedural options for her paroxysmal atrial fibrillation.  Continue Eliquis 5 mg twice daily in the meantime for stroke prophylaxis.         Relevant Orders    CBC WITH DIFFERENTIAL    Comp Metabolic Panel    VITAMIN D,25 HYDROXY (DEFICIENCY)    VITAMIN B12    TSH WITH REFLEX TO FT4    Pulmonary hypertension (HCC) RVSP 30     Chronic ongoing problem, she is now on positive  pressure therapy and this should help to resolve the pulmonary hypertension.         Secondary hypercoagulable state (HCC)     Chronic stable problem, doing fine on Eliquis without any bruising or bleeding complications, continue Eliquis 5 mg twice daily.          Other Visit Diagnoses       Encounter for lipid screening for cardiovascular disease        Relevant Orders    Lipid Profile             RTC: Return in about 6 months (around 9/30/2023).    I spent a total of 34 minutes with record review, exam, communication with the patient, communication with other providers, and documentation of this encounter.    PLEASE NOTE: This dictation was created using voice recognition software. I have made every reasonable attempt to correct obvious errors, but I expect that there are errors of grammar and possibly content that I did not discover before finalizing the note.      Kenna Alamo, DO  Geriatric and Internal Medicine  [facility]

## 2023-03-30 NOTE — ASSESSMENT & PLAN NOTE
Chronic ongoing problem, still having a few episodes of atrial fibrillation per month, more symptomatic with the episode last week.  She is still adjusting to her CPAP machine in hopes that the episodes will diminish as she continues to get used to this.  She continues to have recurrent palpitations and would be happy to refer her to electrophysiology to discuss medical versus procedural options for her paroxysmal atrial fibrillation.  Continue Eliquis 5 mg twice daily in the meantime for stroke prophylaxis.

## 2023-03-30 NOTE — ASSESSMENT & PLAN NOTE
Chronic ongoing problem, she is now on positive pressure therapy and this should help to resolve the pulmonary hypertension.

## 2023-03-30 NOTE — ASSESSMENT & PLAN NOTE
Chronic stable problem, doing fine on Eliquis without any bruising or bleeding complications, continue Eliquis 5 mg twice daily.

## 2023-03-30 NOTE — LETTER
March 30, 2023        Ruby Madgline Emmerling  [address]  [facility] [address]        To whom it may concern,      I saw Luda in my clinic today for normal health maintenance examination.        If you have any questions or concerns, please don't hesitate to call.        Sincerely,        Kenna Alamo D.O.    Electronically Signed

## 2023-04-28 ENCOUNTER — HOSPITAL ENCOUNTER (OUTPATIENT)
Dept: LAB | Facility: MEDICAL CENTER | Age: 78
End: 2023-04-28
Attending: INTERNAL MEDICINE
Payer: MEDICARE

## 2023-04-28 DIAGNOSIS — I48.0 PAROXYSMAL ATRIAL FIBRILLATION (HCC): ICD-10-CM

## 2023-04-28 DIAGNOSIS — Z13.6 ENCOUNTER FOR LIPID SCREENING FOR CARDIOVASCULAR DISEASE: ICD-10-CM

## 2023-04-28 DIAGNOSIS — Z13.220 ENCOUNTER FOR LIPID SCREENING FOR CARDIOVASCULAR DISEASE: ICD-10-CM

## 2023-04-28 LAB
25(OH)D3 SERPL-MCNC: 39 NG/ML (ref 30–100)
BASOPHILS # BLD AUTO: 0.4 % (ref 0–1.8)
BASOPHILS # BLD: 0.03 K/UL (ref 0–0.12)
CALCIUM ALBUM COR SERPL-MCNC: 8.6 MG/DL (ref 8.5–10.5)
CHOLEST SERPL-MCNC: 148 MG/DL (ref 100–199)
EOSINOPHIL # BLD AUTO: 0.12 K/UL (ref 0–0.51)
EOSINOPHIL NFR BLD: 1.7 % (ref 0–6.9)
ERYTHROCYTE [DISTWIDTH] IN BLOOD BY AUTOMATED COUNT: 43.8 FL (ref 35.9–50)
FASTING STATUS PATIENT QL REPORTED: NORMAL
GFR SERPLBLD CREATININE-BSD FMLA CKD-EPI: 85 ML/MIN/1.73 M 2
HCT VFR BLD AUTO: 37.7 % (ref 37–47)
HDLC SERPL-MCNC: 40 MG/DL
HGB BLD-MCNC: 12.5 G/DL (ref 12–16)
IMM GRANULOCYTES # BLD AUTO: 0.03 K/UL (ref 0–0.11)
IMM GRANULOCYTES NFR BLD AUTO: 0.4 % (ref 0–0.9)
LDLC SERPL CALC-MCNC: 86 MG/DL
LYMPHOCYTES # BLD AUTO: 1.57 K/UL (ref 1–4.8)
LYMPHOCYTES NFR BLD: 22.7 % (ref 22–41)
MCH RBC QN AUTO: 29.1 PG (ref 27–33)
MCHC RBC AUTO-ENTMCNC: 33.2 G/DL (ref 33.6–35)
MCV RBC AUTO: 87.9 FL (ref 81.4–97.8)
MONOCYTES # BLD AUTO: 0.5 K/UL (ref 0–0.85)
MONOCYTES NFR BLD AUTO: 7.2 % (ref 0–13.4)
NEUTROPHILS # BLD AUTO: 4.66 K/UL (ref 2–7.15)
NEUTROPHILS NFR BLD: 67.6 % (ref 44–72)
NRBC # BLD AUTO: 0 K/UL
NRBC BLD-RTO: 0 /100 WBC
PLATELET # BLD AUTO: 265 K/UL (ref 164–446)
PMV BLD AUTO: 9.6 FL (ref 9–12.9)
RBC # BLD AUTO: 4.29 M/UL (ref 4.2–5.4)
TRIGL SERPL-MCNC: 108 MG/DL (ref 0–149)
TSH SERPL DL<=0.005 MIU/L-ACNC: 3.32 UIU/ML (ref 0.38–5.33)
VIT B12 SERPL-MCNC: 921 PG/ML (ref 211–911)
WBC # BLD AUTO: 6.9 K/UL (ref 4.8–10.8)

## 2023-04-28 PROCEDURE — 85025 COMPLETE CBC W/AUTO DIFF WBC: CPT

## 2023-04-28 PROCEDURE — 80053 COMPREHEN METABOLIC PANEL: CPT

## 2023-04-28 PROCEDURE — 80061 LIPID PANEL: CPT

## 2023-04-28 PROCEDURE — 82306 VITAMIN D 25 HYDROXY: CPT

## 2023-04-28 PROCEDURE — 84443 ASSAY THYROID STIM HORMONE: CPT

## 2023-04-28 PROCEDURE — 36415 COLL VENOUS BLD VENIPUNCTURE: CPT

## 2023-04-28 PROCEDURE — 82607 VITAMIN B-12: CPT

## 2023-04-29 LAB
ALBUMIN SERPL BCP-MCNC: 4.3 G/DL (ref 3.2–4.9)
ALBUMIN/GLOB SERPL: 1.9 G/DL
ALP SERPL-CCNC: 86 U/L (ref 30–99)
ALT SERPL-CCNC: 31 U/L (ref 2–50)
ANION GAP SERPL CALC-SCNC: 10 MMOL/L (ref 7–16)
AST SERPL-CCNC: 28 U/L (ref 12–45)
BILIRUB SERPL-MCNC: 1.3 MG/DL (ref 0.1–1.5)
BUN SERPL-MCNC: 17 MG/DL (ref 8–22)
CALCIUM SERPL-MCNC: 8.8 MG/DL (ref 8.5–10.5)
CHLORIDE SERPL-SCNC: 107 MMOL/L (ref 96–112)
CO2 SERPL-SCNC: 24 MMOL/L (ref 20–33)
CREAT SERPL-MCNC: 0.73 MG/DL (ref 0.5–1.4)
GLOBULIN SER CALC-MCNC: 2.3 G/DL (ref 1.9–3.5)
GLUCOSE SERPL-MCNC: 90 MG/DL (ref 65–99)
POTASSIUM SERPL-SCNC: 4.2 MMOL/L (ref 3.6–5.5)
PROT SERPL-MCNC: 6.6 G/DL (ref 6–8.2)
SODIUM SERPL-SCNC: 141 MMOL/L (ref 135–145)

## 2023-06-14 ENCOUNTER — OFFICE VISIT (OUTPATIENT)
Dept: SLEEP MEDICINE | Facility: MEDICAL CENTER | Age: 78
End: 2023-06-14
Attending: PHYSICIAN ASSISTANT
Payer: MEDICARE

## 2023-06-14 VITALS
RESPIRATION RATE: 16 BRPM | WEIGHT: 240 LBS | OXYGEN SATURATION: 94 % | HEART RATE: 67 BPM | SYSTOLIC BLOOD PRESSURE: 130 MMHG | BODY MASS INDEX: 42.52 KG/M2 | DIASTOLIC BLOOD PRESSURE: 84 MMHG | HEIGHT: 63 IN

## 2023-06-14 DIAGNOSIS — G47.33 OSA (OBSTRUCTIVE SLEEP APNEA): ICD-10-CM

## 2023-06-14 DIAGNOSIS — I27.20 PULMONARY HYPERTENSION (HCC): ICD-10-CM

## 2023-06-14 PROCEDURE — 3075F SYST BP GE 130 - 139MM HG: CPT | Performed by: PHYSICIAN ASSISTANT

## 2023-06-14 PROCEDURE — 99212 OFFICE O/P EST SF 10 MIN: CPT | Performed by: PHYSICIAN ASSISTANT

## 2023-06-14 PROCEDURE — 3079F DIAST BP 80-89 MM HG: CPT | Performed by: PHYSICIAN ASSISTANT

## 2023-06-14 PROCEDURE — 99213 OFFICE O/P EST LOW 20 MIN: CPT | Performed by: PHYSICIAN ASSISTANT

## 2023-06-14 ASSESSMENT — ENCOUNTER SYMPTOMS
SHORTNESS OF BREATH: 0
DIZZINESS: 0
SORE THROAT: 0
COUGH: 0
TREMORS: 0
SPUTUM PRODUCTION: 0
WEIGHT LOSS: 0
CHILLS: 0
WHEEZING: 0
HEARTBURN: 0
ORTHOPNEA: 0
PALPITATIONS: 1
FEVER: 0
HEADACHES: 0
INSOMNIA: 1

## 2023-06-14 ASSESSMENT — FIBROSIS 4 INDEX: FIB4 SCORE: 1.42

## 2023-06-14 NOTE — LETTER
June 14, 2023        Re: Ruby Madgline Emmerling    To whom it may concern:    Patient was seen in clinic today for extended follow up appointment. Questions or concerns can be directed to our clinic.    Respectfully,      GENNARO Reid P.A.-C.

## 2023-06-14 NOTE — PATIENT INSTRUCTIONS
1-Today we reviewed equipment cleaning  once weekly minimum  mask, tubing and water chamber  use dedicated container  use mild soap and water  SoClean or other ozone  are not recommended  white vinegar and water solution is no longer recommended  hang tubing to dry  mask sanitizing wipes are an option for use   2-Equipment replacement schedule : Mask cushion every month, Mask every 6 months, Head gear every 6 months, Tubing every 3 months, Ultra-fine filters 2 times per month, Humidifier chamber every 6 months   3-As a reminder use distilled water only in humidifier chamber.    4-reviewed compliance which was excellent  5-hybrid mask sample provided   6-follow up in 6 months

## 2023-06-14 NOTE — PROGRESS NOTES
Chief Complaint   Patient presents with    Follow-Up     02/15/23 Jolene Hernandez     1st comp       HPI:  Ruby Madgline Emmerling is a 77 y.o. year old female here today for follow-up on obstructive sleep apnea and first compliance .  Previously seen in clinic by SEBASTIEN Abreu 2/15/2023.  Patient is a former smoker with reported 32-pack-year history and quit date 1998.    Patient is a retired nurse.  Pertinent past medical history includes paroxysmal atrial fibrillation, secondary hypercoagulable state morbid obesity, essential hypertension, pulmonary hypertension, chronic low back pain.  Patient is on 5 mg Eliquis twice daily.    Reviewed in clinic vitals including /84, HR 67, O2 sat 94% on room air and BMI of 42.51 kg/m².    No recent chest imaging.  Echocardiogram obtained 9/16/2021 demonstrated normal left ventricular chamber size, wall thickness, systolic and diastolic function.  LVEF estimated 70%.  Normal right ventricular size and systolic function.  Trace mitral regurgitation, trace tricuspid regurgitation, estimated RVSP of 30 mmHg.    Currently using  auto CPAP @5-15 cm H20 pressure; compliance reviewed for 5/15/2023-6/13/2023, days used 30/30, average daily usage 7 hours 59 minutes, 100% of days greater than or equal to 4 hours, mask leak at 21.1 LPM at 95th percentile, AHI 0.9 per hour.  Pressures are consistently running 13-15    Device obtained May 2023  DME provider Rene Ly  Mask interface fullface mask, sample [facility] hybrid fullface mask provided.    Polysomnogram obtained 9/17/2021 demonstrated overall AHI of 8.09/h increasing to 30.7/h during REM.  Minimum O2 sat of 78% with sats less than 89% for 22 minutes.  Titration study versus auto CPAP trial recommended.    Overnight home sleep test demonstrated mild obstructive sleep apnea with PAHI 9/h, low O2 sat of 83% with sats at or below 88% for 3.1 minutes of evaluation time.  Patient slept primarily in the supine  position.  Recommendation for auto CPAP trial versus updated titration.    Sleep schedule goes to bed 10 PM, wakens 7:30 AM , and gets up during the night much less frequently now down to 3 times on average to use the restroom.     Symptoms denies daytime somnolence, morning headache         Review of Systems   Constitutional:  Positive for malaise/fatigue (mild). Negative for chills, fever and weight loss.   HENT:  Negative for congestion, hearing loss, nosebleeds, sore throat and tinnitus.    Eyes:         Presc glasses   Respiratory:  Negative for cough, sputum production, shortness of breath and wheezing.    Cardiovascular:  Positive for palpitations and leg swelling. Negative for chest pain and orthopnea.   Gastrointestinal:  Negative for heartburn.        Partial upper, missing 2-3 teeth, no swallowing issues   Neurological:  Negative for dizziness, tremors and headaches.   Psychiatric/Behavioral:  The patient has insomnia (occasional).        Past Medical History:   Diagnosis Date    Breast lump     lumpectomy    Chickenpox     Irish measles     Low HDL (under 40) 10/15/2018      Ref. Range 6/25/2021 08:25  Cholesterol,Tot Latest Ref Range: 100 - 199 mg/dL 154  Triglycerides Latest Ref Range: 0 - 149 mg/dL 98  HDL Latest Ref Range: >=40 mg/dL 38 (A)  LDL Latest Ref Range: <100 mg/dL 96   She has history of mild LDL elevation.  She had an abnormal EKG since 2013 with Q waves in anterior leads suggestive of prior anterior infarct.  Nuclear stress test in 2013 did not show        Past Surgical History:   Procedure Laterality Date    ANKLE FUSION      CHOLECYSTECTOMY         Family History   Problem Relation Age of Onset    Cancer Mother         Uterine/Lymphnode/Metastisized    Heart Disease Sister     Cancer Sister         Uterian    Breast Cancer Maternal Aunt     Cancer Sister         Skin    Other Sister         something eating her from inside out/rotting skin    Suicide Attempts Father     No Known  "Problems Brother     No Known Problems Brother     Heart Disease Daughter         HO Congenital heart disease    Other Daughter         epilepsy       Social History     Socioeconomic History    Marital status: Single     Spouse name: Not on file    Number of children: Not on file    Years of education: Not on file    Highest education level: Not on file   Occupational History    Not on file   Tobacco Use    Smoking status: Former     Packs/day: 2.00     Years: 32.00     Pack years: 64.00     Types: Cigarettes     Quit date: 1998     Years since quittin.9    Smokeless tobacco: Never    Tobacco comments:     Started at 20   Vaping Use    Vaping Use: Never used   Substance and Sexual Activity    Alcohol use: Yes     Alcohol/week: 0.0 oz     Comment: 1 beer or a glass of wine per month    Drug use: No    Sexual activity: Never     Partners: Male   Other Topics Concern    Not on file   Social History Narrative    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[employer]     Social Determinants of Health     Financial Resource Strain: Not on file   Food Insecurity: Not on file   Transportation Needs: Not on file   Physical Activity: Not on file   Stress: Not on file   Social Connections: Not on file   Intimate Partner Violence: Not on file   Housing Stability: Not on file       Allergies as of 2023 - Reviewed 2023   Allergen Reaction Noted    Demerol Vomiting 2013        Vitals:  /84   Pulse 67   Resp 16   Ht 1.6 m (5' 3\")   Wt 109 kg (240 lb)   SpO2 94%     Current medications as of today   Current Outpatient Medications   Medication Sig Dispense Refill    ELIQUIS 5 MG Tab TAKE 1 TABLET BY MOUTH TWICE A  Tablet 3    FIBER PO Take  by mouth. 2 gummies daily      COLLAGEN PO Take  by mouth. 1 scoop powder daily      MULTIPLE VITAMIN PO Take 1 Tablet by mouth every day.      Multiple Vitamins-Minerals (PRESERVISION AREDS 2 PO) Take 2 Tablets by mouth every day.      Misc Natural Products (FOCUSED MIND " PO) Take 2 Capsules by mouth every day.      SUPER B COMPLEX/C PO Take 1 capsule by mouth every day.      MAGNESIUM PO Take 1 Tablet by mouth. Unable to recall dose      vitamin D (CHOLECALCIFEROL) 1000 UNIT Tab Take 1,000 Units by mouth every day.      POTASSIUM PO Take 1 Tablet by mouth every day. Unable to recall dose       No current facility-administered medications for this visit.         Physical Exam:   Gen:           Alert and oriented, No apparent distress. Mood and affect appropriate, normal interaction with examiner.   Hearing:     Grossly intact.  Nose:          Normal, no lesions or deformities.  Dentition:    fair dentition.   Oropharynx:   Tongue normal, posterior pharynx without erythema or exudate.  Mallampati Classification: II/III  Neck:        Supple, trachea midline, no masses.  Respiratory Effort: No intercostal retractions or use of accessory muscles.   Gait and Station: Normal.  Digits and Nails: No clubbing, cyanosis, petechiae, or nodes.   Skin:        No rashes, lesions or ulcers noted.               Ext:           No cyanosis or edema.      Immunizations:  Flu: 10/26/2022  Pneumovax 23: 1/29/2019  Prevnar 13: 12/4/2017  Moderna SARS-CoV-2 vaccine: 12/20/2021, 4/10/2021, 3/12/2021    Assessment / Plan:  1. KOFI (obstructive sleep apnea)  - DME Other    Reviewed compliance, meeting therapeutic goals, required pressures high end of range, adjust pressure range slightly to accommodate.  Provided with sample of hybrid mask for comfort, notify clinic if wishes to change to that style for orders to [facility].  We will have patient follow-up in 6 months due to changes.    2. BMI 40.0-44.9, adult ([facility])  Discussion regarding impact central adiposity on pulmonary function.  Encouraged to increase activity as tolerated and monitor nutritional intake.      3. Pulmonary hypertension (HCC) RVSP 30  Mild, follow-up as previously recommended, and treating underlying condition with PAP therapy.      Follow-up:    Return in about 6 months (around 12/14/2023) for Return with Ignacia Ramirez PA-C.    Please note that this dictation was created using voice recognition software. I have made every reasonable attempt to correct obvious errors, but it is possible there are errors of grammar and possibly content that I did not discover before finalizing the note.

## 2023-07-17 DIAGNOSIS — I48.0 PAROXYSMAL ATRIAL FIBRILLATION (HCC): ICD-10-CM

## 2023-08-15 ENCOUNTER — APPOINTMENT (OUTPATIENT)
Dept: MEDICAL GROUP | Facility: PHYSICIAN GROUP | Age: 78
End: 2023-08-15
Payer: MEDICARE

## 2023-08-16 ENCOUNTER — OFFICE VISIT (OUTPATIENT)
Dept: CARDIOLOGY | Facility: MEDICAL CENTER | Age: 78
End: 2023-08-16
Attending: INTERNAL MEDICINE
Payer: MEDICARE

## 2023-08-16 VITALS
HEART RATE: 60 BPM | OXYGEN SATURATION: 96 % | SYSTOLIC BLOOD PRESSURE: 128 MMHG | WEIGHT: 244 LBS | BODY MASS INDEX: 43.23 KG/M2 | DIASTOLIC BLOOD PRESSURE: 72 MMHG | HEIGHT: 63 IN | RESPIRATION RATE: 16 BRPM

## 2023-08-16 DIAGNOSIS — I48.0 PAROXYSMAL ATRIAL FIBRILLATION (HCC): ICD-10-CM

## 2023-08-16 PROCEDURE — 3078F DIAST BP <80 MM HG: CPT | Performed by: INTERNAL MEDICINE

## 2023-08-16 PROCEDURE — 93010 ELECTROCARDIOGRAM REPORT: CPT | Performed by: INTERNAL MEDICINE

## 2023-08-16 PROCEDURE — 99204 OFFICE O/P NEW MOD 45 MIN: CPT | Mod: 25 | Performed by: INTERNAL MEDICINE

## 2023-08-16 PROCEDURE — 93005 ELECTROCARDIOGRAM TRACING: CPT | Performed by: INTERNAL MEDICINE

## 2023-08-16 PROCEDURE — 3074F SYST BP LT 130 MM HG: CPT | Performed by: INTERNAL MEDICINE

## 2023-08-16 PROCEDURE — 99212 OFFICE O/P EST SF 10 MIN: CPT | Performed by: INTERNAL MEDICINE

## 2023-08-16 ASSESSMENT — FIBROSIS 4 INDEX: FIB4 SCORE: 1.44

## 2023-08-16 NOTE — PROGRESS NOTES
Arrhythmia Clinic Note (New patient)     DOS: 2023    Referring physician: Dr Alamo    Chief complaint/Reason for consult: Afib    HPI: 77 y/o F with pAF as diagnosed by Apple watch, on Eliquis, presenting for evaluation. She has palpitations that start around 4AM and can last 1-2 hrs sometimes longer. This happens 1-2x a month. She is referred for management.     ROS (+ highlighted in bold):  Constitutional: Fevers/chills/fatigue/weightloss  HEENT: Blurry vision/eye pain/sore throat/hearing loss  Respiratory: Shortness of breath/cough  Cardiovascular: Chest pain/palpitations/edema/orthopnea/syncope  GI: Nausea/vomitting/diarrhea  MSK: Arthralgias/myagias/muscle weakness  Skin: Rash/sores  Neurological: Numbness/tremors/vertigo  Endocrine: Excessive thirst/polyuria/cold intolerance/heat intolerance  Psych: Depression/anxiety    Past Medical History:   Diagnosis Date    Breast lump     lumpectomy    Chickenpox     Yoruba measles     Low HDL (under 40) 10/15/2018      Ref. Range 2021 08:25  Cholesterol,Tot Latest Ref Range: 100 - 199 mg/dL 154  Triglycerides Latest Ref Range: 0 - 149 mg/dL 98  HDL Latest Ref Range: >=40 mg/dL 38 (A)  LDL Latest Ref Range: <100 mg/dL 96   She has history of mild LDL elevation.  She had an abnormal EKG since 2013 with Q waves in anterior leads suggestive of prior anterior infarct.  Nuclear stress test in 2013 did not show        Past Surgical History:   Procedure Laterality Date    ANKLE FUSION      CHOLECYSTECTOMY         Social History     Socioeconomic History    Marital status: Single     Spouse name: Not on file    Number of children: Not on file    Years of education: Not on file    Highest education level: Not on file   Occupational History    Not on file   Tobacco Use    Smoking status: Former     Packs/day: 2.00     Years: 32.00     Additional pack years: 0.00     Total pack years: 64.00     Types: Cigarettes     Quit date: 1998     Years since quittin.1     Smokeless tobacco: Never   Vaping Use    Vaping Use: Never used   Substance and Sexual Activity    Alcohol use: Not Currently    Drug use: No    Sexual activity: Never     Partners: Male   Other Topics Concern    Not on file   Social History Narrative    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[employer]     Social Determinants of Health     Financial Resource Strain: Not on file   Food Insecurity: Not on file   Transportation Needs: Not on file   Physical Activity: Not on file   Stress: Not on file   Social Connections: Not on file   Intimate Partner Violence: Not on file   Housing Stability: Not on file       Family History   Problem Relation Age of Onset    Cancer Mother         Uterine/Lymphnode/Metastisized    Heart Disease Sister     Cancer Sister         Uterian    Breast Cancer Maternal Aunt     Cancer Sister         Skin    Other Sister         something eating her from inside out/rotting skin    Suicide Attempts Father     No Known Problems Brother     No Known Problems Brother     Heart Disease Daughter         HO Congenital heart disease    Other Daughter         epilepsy       Allergies   Allergen Reactions    Demerol Vomiting       Current Outpatient Medications   Medication Sig Dispense Refill    ELIQUIS 5 MG Tab TAKE 1 TABLET BY MOUTH TWICE A  Tablet 3    FIBER PO Take  by mouth. 2 gummies daily      COLLAGEN PO Take  by mouth. 1 scoop powder daily      MULTIPLE VITAMIN PO Take 1 Tablet by mouth every day.      Multiple Vitamins-Minerals (PRESERVISION AREDS 2 PO) Take 2 Tablets by mouth every day.      Misc Natural Products (FOCUSED MIND PO) Take 2 Capsules by mouth every day.      SUPER B COMPLEX/C PO Take 1 capsule by mouth every day.      MAGNESIUM PO Take 1 Tablet by mouth. Unable to recall dose      vitamin D (CHOLECALCIFEROL) 1000 UNIT Tab Take 1,000 Units by mouth every day.      POTASSIUM PO Take 1 Tablet by mouth every day. Unable to recall dose       No current facility-administered medications for  "this visit.       Physical Exam:  Vitals:    08/16/23 0951   BP: 128/72   BP Location: Left arm   Patient Position: Sitting   BP Cuff Size: Adult   Pulse: 60   Resp: 16   SpO2: 96%   Weight: 111 kg (244 lb)   Height: 1.6 m (5' 3\")     General appearance: NAD, conversant   Eyes: anicteric sclerae, moist conjunctivae; no lid-lag; PERRLA  HENT: Atraumatic; oropharynx clear with moist mucous membranes and no mucosal ulcerations; normal hard and soft palate  Neck: Trachea midline; FROM, supple, no thyromegaly or lymphadenopathy  Lungs: CTA, with normal respiratory effort and no intercostal retractions  CV: RRR, no MRGs, no JVD   Abdomen: Soft, non-tender; no masses or HSM  Extremities: No peripheral edema or extremity lymphadenopathy  Skin: Normal temperature, turgor and texture; no rash, ulcers or subcutaneous nodules  Psych: Appropriate affect, alert and oriented to person, place and time    Data:  Lipids:   Lab Results   Component Value Date/Time    CHOLSTRLTOT 148 04/28/2023 01:09 PM    TRIGLYCERIDE 108 04/28/2023 01:09 PM    HDL 40 04/28/2023 01:09 PM    LDL 86 04/28/2023 01:09 PM        BMP:  Lab Results   Component Value Date/Time    SODIUM 141 04/28/2023 1309    POTASSIUM 4.2 04/28/2023 1309    CHLORIDE 107 04/28/2023 1309    CO2 24 04/28/2023 1309    GLUCOSE 90 04/28/2023 1309    BUN 17 04/28/2023 1309    CREATININE 0.73 04/28/2023 1309    CALCIUM 8.8 04/28/2023 1309    ANION 10.0 04/28/2023 1309        TSH:   Lab Results   Component Value Date/Time    TSHULTRASEN 3.320 04/28/2023 1309        THYROXINE (T4):   No results found for: \"FREEDIR\"     CBC:   Lab Results   Component Value Date/Time    WBC 6.9 04/28/2023 01:09 PM    RBC 4.29 04/28/2023 01:09 PM    HEMOGLOBIN 12.5 04/28/2023 01:09 PM    HEMATOCRIT 37.7 04/28/2023 01:09 PM    MCV 87.9 04/28/2023 01:09 PM    MCH 29.1 04/28/2023 01:09 PM    MCHC 33.2 (L) 04/28/2023 01:09 PM    RDW 43.8 04/28/2023 01:09 PM    PLATELETCT 265 04/28/2023 01:09 PM    MPV 9.6 " 04/28/2023 01:09 PM    NEUTSPOLYS 67.60 04/28/2023 01:09 PM    LYMPHOCYTES 22.70 04/28/2023 01:09 PM    MONOCYTES 7.20 04/28/2023 01:09 PM    EOSINOPHILS 1.70 04/28/2023 01:09 PM    BASOPHILS 0.40 04/28/2023 01:09 PM    IMMGRAN 0.40 04/28/2023 01:09 PM    NRBC 0.00 04/28/2023 01:09 PM    NEUTS 4.66 04/28/2023 01:09 PM    LYMPHS 1.57 04/28/2023 01:09 PM    MONOS 0.50 04/28/2023 01:09 PM    EOS 0.12 04/28/2023 01:09 PM    BASO 0.03 04/28/2023 01:09 PM    IMMGRANAB 0.03 04/28/2023 01:09 PM    NRBCAB 0.00 04/28/2023 01:09 PM        CBC w/o DIFF  Lab Results   Component Value Date/Time    WBC 6.9 04/28/2023 01:09 PM    RBC 4.29 04/28/2023 01:09 PM    HEMOGLOBIN 12.5 04/28/2023 01:09 PM    MCV 87.9 04/28/2023 01:09 PM    MCH 29.1 04/28/2023 01:09 PM    MCHC 33.2 (L) 04/28/2023 01:09 PM    RDW 43.8 04/28/2023 01:09 PM    MPV 9.6 04/28/2023 01:09 PM       Prior echo/stress results reviewed: Normal EF    EKG interpreted by me: NSR    Impression/Plan:  1. Paroxysmal atrial fibrillation (HCC)  EKG    Cardiac Event Monitor        pAF  Hypercoagulable state due to afib    - Afib diagnosed by apple watch but should confirm with Biotel I will order 30 day monitor  - She is not interested in invasive options for management at this time, would potentially consider AAD if symptoms worsen but for now is reassured regarding non-malignant diagnosis as long as she stays on OAC  - Labs reviewed from April 2023 continue Eliquis for [id number] 3-4    FV 6 months with afib clinic TOREY    Kolby Walsh MD  Cardiac Electrophysiology

## 2023-08-17 ENCOUNTER — NON-PROVIDER VISIT (OUTPATIENT)
Dept: CARDIOLOGY | Facility: MEDICAL CENTER | Age: 78
End: 2023-08-17
Attending: INTERNAL MEDICINE
Payer: MEDICARE

## 2023-08-17 ENCOUNTER — TELEPHONE (OUTPATIENT)
Dept: MEDICAL GROUP | Facility: PHYSICIAN GROUP | Age: 78
End: 2023-08-17
Payer: MEDICARE

## 2023-08-17 DIAGNOSIS — I48.0 PAROXYSMAL ATRIAL FIBRILLATION (HCC): ICD-10-CM

## 2023-08-17 NOTE — PROGRESS NOTES
Home enrollment completed for the 30 day [employer] Heart monitoring program per Kolby Walsh MD..  >Monitor shipped to patient by Al.  >Pending Baseline.  >Pending EOS.

## 2023-08-17 NOTE — TELEPHONE ENCOUNTER
1. Caller Name: Ruby Madgline Emmerling                          Call Back Number: [phone] (home)         How would the patient prefer to be contacted with a response: Phone call OK to leave a detailed message    Called to offer earlier appointments asked her to call me back.

## 2023-08-18 ENCOUNTER — OFFICE VISIT (OUTPATIENT)
Dept: MEDICAL GROUP | Facility: PHYSICIAN GROUP | Age: 78
End: 2023-08-18
Payer: MEDICARE

## 2023-08-18 VITALS
HEIGHT: 63 IN | WEIGHT: 243 LBS | BODY MASS INDEX: 43.05 KG/M2 | HEART RATE: 61 BPM | OXYGEN SATURATION: 97 % | RESPIRATION RATE: 16 BRPM | SYSTOLIC BLOOD PRESSURE: 126 MMHG | DIASTOLIC BLOOD PRESSURE: 72 MMHG | TEMPERATURE: 98 F

## 2023-08-18 DIAGNOSIS — I48.0 PAROXYSMAL ATRIAL FIBRILLATION (HCC): ICD-10-CM

## 2023-08-18 DIAGNOSIS — D68.69 SECONDARY HYPERCOAGULABLE STATE (HCC): ICD-10-CM

## 2023-08-18 DIAGNOSIS — I10 ESSENTIAL HYPERTENSION: ICD-10-CM

## 2023-08-18 DIAGNOSIS — G47.33 OBSTRUCTIVE SLEEP APNEA: ICD-10-CM

## 2023-08-18 DIAGNOSIS — Z12.31 ENCOUNTER FOR SCREENING MAMMOGRAM FOR BREAST CANCER: ICD-10-CM

## 2023-08-18 DIAGNOSIS — E78.6 LOW HDL (UNDER 40): ICD-10-CM

## 2023-08-18 DIAGNOSIS — Z78.0 MENOPAUSE: ICD-10-CM

## 2023-08-18 PROCEDURE — 3078F DIAST BP <80 MM HG: CPT | Performed by: INTERNAL MEDICINE

## 2023-08-18 PROCEDURE — 3074F SYST BP LT 130 MM HG: CPT | Performed by: INTERNAL MEDICINE

## 2023-08-18 PROCEDURE — 99214 OFFICE O/P EST MOD 30 MIN: CPT | Performed by: INTERNAL MEDICINE

## 2023-08-18 ASSESSMENT — FIBROSIS 4 INDEX: FIB4 SCORE: 1.44

## 2023-08-18 NOTE — PROGRESS NOTES
Subjective:   Chief Complaint/History of Present Illness:  Ruby Madgline Emmerling is a 78 y.o. female established patient who presents today to discuss medical problems as listed below. Luda is unaccompanied for today's visit.    Problem   Obstructive Sleep Apnea    PSG study from 9/17/21 indicated mild OAS with AHI of 8.1/hr and O2 loretta 78 %. AHI during rem was 30.7 and AHI while supine was 8.09. There was a mean oxygen saturation of 92.0% with a minimum oxygen saturation of 78.0%. Time spent during sleep with oxygen saturations below 89% was 22.0 minutes. The patient spent 0.5 minutes of TST with SaO2 <88%.    Currently using  auto CPAP @5-15 cm H20 pressure; compliance reviewed for 5/15/2023-6/13/2023, days used 30/30, average daily usage 7 hours 59 minutes, 100% of days greater than or equal to 4 hours, mask leak at 21.1 LPM at 95th percentile, AHI 0.9 per hour.  Pressures are consistently running 13-15     Secondary Hypercoagulable State (Hcc)    She has paroxysmal atrial fibrillation.  Recommended to go on systemic anticoagulation however she is happening pretty coverages not affordable.  She will plan to have coverage as of January 2022.  She continues on aspirin 81 mg in the meantime.    Initial [id number] score was 4 (history of MI, hypertension, age, gender) however stress test and echocardiogram did not show any evidence of prior MI and her blood pressure is controlled without pharmacotherapy so at this moment is closer to 2.    Current regimen: Eliquis 5 mg twice daily     Paroxysmal Atrial Fibrillation (Hcc)    She presents to clinic to review concerns over new onset atrial fibrillation.  She has an apple smart watch and on 2 occasions has been alerted of irregular rhythm.  These events were on April 25 and Marilee 10 and again in September while she was sleeping.  Normally occurs while she is supine in bed resting.  Heart rate goes as high as the mid 140s.  She is symptomatic with feeling restless.   No personal history and no prior evaluation.     She has history of morbid obesity, evaluation for sleep apnea was positive and she is working with sleep medicine on positive pressure therapy.  Former smoker.  She has moderate alcohol consumption.  She had a reassuring stress test in 2013 but no recent cardiac evaluation.  No other history of lung disease. Lab work negative for thyroid disease, anemia, or electrolyte abnormality.    On follow-up in March 2023 she reports more symptomatic palpitations with heart rate up to the 140s.  She is not sure if she would want to do an ablation but may be open to medications if needed.  S    She met with Dr. Walsh of electrophysiology in August 2023 and he recommends 30-day Biotel monitor to look at burden of paroxysmal atrial fibrillation.  Appropriate continue with oral anticoagulation but no rate or rhythm medicines indicated at this time.     Essential hypertension_off pharmacotherapy    She was diagnosed with elevated blood pressure in 2013.  She is not using pharmacotherapy at this time.      Blood pressure in clinic 126/72    EKG shows previous anterior infarct since 2013 with reassuring nuclear stress test completed at that time.              Current Medications:  Current Outpatient Medications Ordered in Epic   Medication Sig Dispense Refill    ELIQUIS 5 MG Tab TAKE 1 TABLET BY MOUTH TWICE A  Tablet 3    FIBER PO Take  by mouth. 2 gummies daily      COLLAGEN PO Take  by mouth. 1 scoop powder daily      MULTIPLE VITAMIN PO Take 1 Tablet by mouth every day.      Multiple Vitamins-Minerals (PRESERVISION AREDS 2 PO) Take 2 Tablets by mouth every day.      Misc Natural Products (FOCUSED MIND PO) Take 2 Capsules by mouth every day.      SUPER B COMPLEX/C PO Take 1 capsule by mouth every day.      MAGNESIUM PO Take 1 Tablet by mouth. Unable to recall dose      vitamin D (CHOLECALCIFEROL) 1000 UNIT Tab Take 1,000 Units by mouth every day.      POTASSIUM PO Take 1 Tablet  "by mouth every day. Unable to recall dose       No current Epic-ordered facility-administered medications on file.          Objective:   Physical Exam:    Vitals: /72 (BP Location: Right arm, Patient Position: Sitting, BP Cuff Size: Large adult)   Pulse 61   Temp 36.7 °C (98 °F) (Temporal)   Resp 16   Ht 1.6 m (5' 3\")   Wt 110 kg (243 lb)   SpO2 97%    BMI: Body mass index is 43.05 kg/m².  Physical Exam  Constitutional:       General: She is not in acute distress.     Appearance: Normal appearance. She is not ill-appearing.   HENT:      Right Ear: External ear normal.      Left Ear: External ear normal.   Eyes:      General: No scleral icterus.     Conjunctiva/sclera: Conjunctivae normal.   Cardiovascular:      Rate and Rhythm: Normal rate and regular rhythm.      Pulses: Normal pulses.      Heart sounds: No murmur heard.  Pulmonary:      Effort: Pulmonary effort is normal. No respiratory distress.      Breath sounds: No wheezing, rhonchi or rales.   Abdominal:      General: Bowel sounds are normal. There is no distension.      Palpations: Abdomen is soft.      Tenderness: There is no abdominal tenderness.   Musculoskeletal:      Right lower leg: No edema.      Left lower leg: No edema.   Lymphadenopathy:      Cervical: No cervical adenopathy.   Skin:     General: Skin is warm and dry.      Findings: No rash.   Neurological:      Gait: Gait normal.   Psychiatric:         Mood and Affect: Mood normal.         Behavior: Behavior normal.         Thought Content: Thought content normal.         Judgment: Judgment normal.               Assessment and Plan:   Luda is a 78 y.o. female with the following:  Problem List Items Addressed This Visit       Essential hypertension_off pharmacotherapy     Chronic stable problem.  Continues to be appropriate to monitor off pharmacotherapy, blood pressure averages 120s over 70s in clinic.  Continue with follow-up twice annually to ensure stability.         Obstructive " sleep apnea     Chronic stable problem.  She is compliant with positive pressure therapy, continue follow-up with sleep medicine as recommended and continue CPAP 5 to 15 cm water pressure, 100% compliance on review in May 2023.         Paroxysmal atrial fibrillation (HCC)     Chronic stable problem.  Still with intermittent episodes of palpitations, is going to wear a 30-day Biotel monitor to determine overall burden of her paroxysmal atrial fibrillation.  She will continue with oral anticoagulation for stroke prophylaxis but no indication for medications or procedures at this time.         Relevant Orders    FREE THYROXINE    TSH    Comp Metabolic Panel    CBC WITH DIFFERENTIAL    Secondary hypercoagulable state (HCC)     Chronic ongoing problem, continue with Eliquis 5 mg twice daily for stroke prophylaxis.          Other Visit Diagnoses       Menopause        Relevant Orders    DS-BONE DENSITY STUDY (DEXA)    Encounter for screening mammogram for breast cancer        Relevant Orders    MA-SCREENING MAMMO BILAT W/TOMOSYNTHESIS W/CAD    Low HDL (under 40)        Relevant Orders    FREE THYROXINE    TSH    VITAMIN B12    VITAMIN D,25 HYDROXY (DEFICIENCY)    Lipid Profile    Comp Metabolic Panel    CBC WITH DIFFERENTIAL               RTC: Return in about 6 months (around 2/18/2024).    I spent a total of 30 minutes with record review, exam, communication with the patient, communication with other providers, and documentation of this encounter.    PLEASE NOTE: This dictation was created using voice recognition software. I have made every reasonable attempt to correct obvious errors, but I expect that there are errors of grammar and possibly content that I did not discover before finalizing the note.      Kenna Alamo, DO  Geriatric and Internal Medicine  [facility]

## 2023-08-18 NOTE — ASSESSMENT & PLAN NOTE
Chronic stable problem.  Continues to be appropriate to monitor off pharmacotherapy, blood pressure averages 120s over 70s in clinic.  Continue with follow-up twice annually to ensure stability.

## 2023-08-18 NOTE — ASSESSMENT & PLAN NOTE
Chronic stable problem.  Still with intermittent episodes of palpitations, is going to wear a 30-day Biotel monitor to determine overall burden of her paroxysmal atrial fibrillation.  She will continue with oral anticoagulation for stroke prophylaxis but no indication for medications or procedures at this time.

## 2023-08-18 NOTE — ASSESSMENT & PLAN NOTE
Chronic stable problem.  She is compliant with positive pressure therapy, continue follow-up with sleep medicine as recommended and continue CPAP 5 to 15 cm water pressure, 100% compliance on review in May 2023.

## 2023-08-22 ENCOUNTER — APPOINTMENT (OUTPATIENT)
Dept: ADMISSIONS | Facility: MEDICAL CENTER | Age: 78
End: 2023-08-22
Attending: INTERNAL MEDICINE
Payer: MEDICARE

## 2023-08-31 LAB — EKG IMPRESSION: NORMAL

## 2023-09-01 ENCOUNTER — PRE-ADMISSION TESTING (OUTPATIENT)
Dept: ADMISSIONS | Facility: MEDICAL CENTER | Age: 78
End: 2023-09-01
Attending: INTERNAL MEDICINE
Payer: MEDICARE

## 2023-09-01 VITALS — HEIGHT: 63 IN | BODY MASS INDEX: 43.05 KG/M2

## 2023-09-01 NOTE — PREPROCEDURE INSTRUCTIONS
Pre-admit telephone appointment completed. Reviewed the Preparing for your procedure handout with patient over the phone. Patient instructed per pharmacy guidelines regarding taking or holding regularly prescribed medications before surgery. Instructed to take the following medications the day of surgery with a sip of water per pharmacy medication guidelines: None.  Pt has colonoscopy prep instructions.    Denies anesthesia complications  Will bring CPAP

## 2023-09-05 ENCOUNTER — TELEPHONE (OUTPATIENT)
Dept: CARDIOLOGY | Facility: MEDICAL CENTER | Age: 78
End: 2023-09-05
Payer: MEDICARE

## 2023-09-05 NOTE — TELEPHONE ENCOUNTER
"S/w patient regarding heart monitor, patient only could talk about 2 minutes (she was on the other line with a customer)  Patient stated she only felt tired, no other symptoms.   Vitals reported stable \"I guess okay\"      "

## 2023-09-05 NOTE — TELEPHONE ENCOUNTER
Phone Number Called: [phone]    Call outcome: Did not leave a detailed message. Requested patient to call back.    Message: Called to inform patient of heart monitor showing Afib, asked patient to call back to discuss.

## 2023-09-05 NOTE — TELEPHONE ENCOUNTER
Caller: Angelo Lares    Topic/issue: Luda is returning your call.     Callback Number: [phone]    Thank you,   Tierney LOZOYA

## 2023-09-13 ENCOUNTER — ANESTHESIA EVENT (OUTPATIENT)
Dept: SURGERY | Facility: MEDICAL CENTER | Age: 78
End: 2023-09-13
Payer: MEDICARE

## 2023-09-13 ENCOUNTER — ANESTHESIA (OUTPATIENT)
Dept: SURGERY | Facility: MEDICAL CENTER | Age: 78
End: 2023-09-13
Payer: MEDICARE

## 2023-09-13 ENCOUNTER — HOSPITAL ENCOUNTER (OUTPATIENT)
Facility: MEDICAL CENTER | Age: 78
End: 2023-09-13
Attending: INTERNAL MEDICINE | Admitting: INTERNAL MEDICINE
Payer: MEDICARE

## 2023-09-13 VITALS
BODY MASS INDEX: 42.73 KG/M2 | OXYGEN SATURATION: 97 % | DIASTOLIC BLOOD PRESSURE: 80 MMHG | WEIGHT: 241.18 LBS | HEART RATE: 51 BPM | SYSTOLIC BLOOD PRESSURE: 162 MMHG | TEMPERATURE: 97.9 F | RESPIRATION RATE: 20 BRPM | HEIGHT: 63 IN

## 2023-09-13 PROBLEM — K63.5 COLON POLYPS: Status: ACTIVE | Noted: 2023-09-13

## 2023-09-13 LAB — PATHOLOGY CONSULT NOTE: NORMAL

## 2023-09-13 PROCEDURE — 88305 TISSUE EXAM BY PATHOLOGIST: CPT | Mod: 59

## 2023-09-13 PROCEDURE — 160009 HCHG ANES TIME/MIN: Performed by: INTERNAL MEDICINE

## 2023-09-13 PROCEDURE — 160208 HCHG ENDO MINUTES - EA ADDL 1 MIN LEVEL 4: Performed by: INTERNAL MEDICINE

## 2023-09-13 PROCEDURE — 110371 HCHG SHELL REV 272: Performed by: INTERNAL MEDICINE

## 2023-09-13 PROCEDURE — 700101 HCHG RX REV CODE 250: Performed by: STUDENT IN AN ORGANIZED HEALTH CARE EDUCATION/TRAINING PROGRAM

## 2023-09-13 PROCEDURE — 700111 HCHG RX REV CODE 636 W/ 250 OVERRIDE (IP): Performed by: STUDENT IN AN ORGANIZED HEALTH CARE EDUCATION/TRAINING PROGRAM

## 2023-09-13 PROCEDURE — 700105 HCHG RX REV CODE 258: Performed by: INTERNAL MEDICINE

## 2023-09-13 PROCEDURE — 700105 HCHG RX REV CODE 258: Performed by: STUDENT IN AN ORGANIZED HEALTH CARE EDUCATION/TRAINING PROGRAM

## 2023-09-13 PROCEDURE — 160048 HCHG OR STATISTICAL LEVEL 1-5: Performed by: INTERNAL MEDICINE

## 2023-09-13 PROCEDURE — 160025 RECOVERY II MINUTES (STATS): Performed by: INTERNAL MEDICINE

## 2023-09-13 PROCEDURE — 160046 HCHG PACU - 1ST 60 MINS PHASE II: Performed by: INTERNAL MEDICINE

## 2023-09-13 PROCEDURE — 160002 HCHG RECOVERY MINUTES (STAT): Performed by: INTERNAL MEDICINE

## 2023-09-13 PROCEDURE — 160035 HCHG PACU - 1ST 60 MINS PHASE I: Performed by: INTERNAL MEDICINE

## 2023-09-13 PROCEDURE — 160203 HCHG ENDO MINUTES - 1ST 30 MINS LEVEL 4: Performed by: INTERNAL MEDICINE

## 2023-09-13 RX ORDER — ONDANSETRON 2 MG/ML
4 INJECTION INTRAMUSCULAR; INTRAVENOUS
Status: DISCONTINUED | OUTPATIENT
Start: 2023-09-13 | End: 2023-09-13 | Stop reason: HOSPADM

## 2023-09-13 RX ORDER — HYDROMORPHONE HYDROCHLORIDE 1 MG/ML
0.2 INJECTION, SOLUTION INTRAMUSCULAR; INTRAVENOUS; SUBCUTANEOUS
Status: DISCONTINUED | OUTPATIENT
Start: 2023-09-13 | End: 2023-09-13 | Stop reason: HOSPADM

## 2023-09-13 RX ORDER — SODIUM CHLORIDE, SODIUM LACTATE, POTASSIUM CHLORIDE, CALCIUM CHLORIDE 600; 310; 30; 20 MG/100ML; MG/100ML; MG/100ML; MG/100ML
INJECTION, SOLUTION INTRAVENOUS CONTINUOUS
Status: DISCONTINUED | OUTPATIENT
Start: 2023-09-13 | End: 2023-09-13 | Stop reason: HOSPADM

## 2023-09-13 RX ORDER — HYDROMORPHONE HYDROCHLORIDE 1 MG/ML
0.1 INJECTION, SOLUTION INTRAMUSCULAR; INTRAVENOUS; SUBCUTANEOUS
Status: DISCONTINUED | OUTPATIENT
Start: 2023-09-13 | End: 2023-09-13 | Stop reason: HOSPADM

## 2023-09-13 RX ORDER — SODIUM CHLORIDE, SODIUM LACTATE, POTASSIUM CHLORIDE, CALCIUM CHLORIDE 600; 310; 30; 20 MG/100ML; MG/100ML; MG/100ML; MG/100ML
INJECTION, SOLUTION INTRAVENOUS
Status: DISCONTINUED | OUTPATIENT
Start: 2023-09-13 | End: 2023-09-13 | Stop reason: SURG

## 2023-09-13 RX ORDER — HALOPERIDOL 5 MG/ML
1 INJECTION INTRAMUSCULAR
Status: DISCONTINUED | OUTPATIENT
Start: 2023-09-13 | End: 2023-09-13 | Stop reason: HOSPADM

## 2023-09-13 RX ORDER — DIPHENHYDRAMINE HYDROCHLORIDE 50 MG/ML
12.5 INJECTION INTRAMUSCULAR; INTRAVENOUS
Status: DISCONTINUED | OUTPATIENT
Start: 2023-09-13 | End: 2023-09-13 | Stop reason: HOSPADM

## 2023-09-13 RX ORDER — HYDRALAZINE HYDROCHLORIDE 20 MG/ML
5 INJECTION INTRAMUSCULAR; INTRAVENOUS
Status: DISCONTINUED | OUTPATIENT
Start: 2023-09-13 | End: 2023-09-13 | Stop reason: HOSPADM

## 2023-09-13 RX ORDER — HYDROMORPHONE HYDROCHLORIDE 1 MG/ML
0.4 INJECTION, SOLUTION INTRAMUSCULAR; INTRAVENOUS; SUBCUTANEOUS
Status: DISCONTINUED | OUTPATIENT
Start: 2023-09-13 | End: 2023-09-13 | Stop reason: HOSPADM

## 2023-09-13 RX ORDER — LIDOCAINE HYDROCHLORIDE 20 MG/ML
INJECTION, SOLUTION EPIDURAL; INFILTRATION; INTRACAUDAL; PERINEURAL PRN
Status: DISCONTINUED | OUTPATIENT
Start: 2023-09-13 | End: 2023-09-13 | Stop reason: SURG

## 2023-09-13 RX ORDER — SODIUM CHLORIDE, SODIUM LACTATE, POTASSIUM CHLORIDE, CALCIUM CHLORIDE 600; 310; 30; 20 MG/100ML; MG/100ML; MG/100ML; MG/100ML
INJECTION, SOLUTION INTRAVENOUS
Status: DISCONTINUED | OUTPATIENT
Start: 2023-09-13 | End: 2023-09-13

## 2023-09-13 RX ORDER — EPHEDRINE SULFATE 50 MG/ML
5 INJECTION, SOLUTION INTRAVENOUS
Status: DISCONTINUED | OUTPATIENT
Start: 2023-09-13 | End: 2023-09-13 | Stop reason: HOSPADM

## 2023-09-13 RX ADMIN — LIDOCAINE HYDROCHLORIDE 30 MG: 20 INJECTION, SOLUTION EPIDURAL; INFILTRATION; INTRACAUDAL at 08:20

## 2023-09-13 RX ADMIN — PROPOFOL 50 MG: 10 INJECTION, EMULSION INTRAVENOUS at 08:20

## 2023-09-13 RX ADMIN — PROPOFOL 100 MCG/KG/MIN: 10 INJECTION, EMULSION INTRAVENOUS at 08:19

## 2023-09-13 RX ADMIN — SODIUM CHLORIDE, POTASSIUM CHLORIDE, SODIUM LACTATE AND CALCIUM CHLORIDE: 600; 310; 30; 20 INJECTION, SOLUTION INTRAVENOUS at 07:37

## 2023-09-13 RX ADMIN — SODIUM CHLORIDE, POTASSIUM CHLORIDE, SODIUM LACTATE AND CALCIUM CHLORIDE: 600; 310; 30; 20 INJECTION, SOLUTION INTRAVENOUS at 08:17

## 2023-09-13 RX ADMIN — PROPOFOL 40 MG: 10 INJECTION, EMULSION INTRAVENOUS at 08:21

## 2023-09-13 ASSESSMENT — FIBROSIS 4 INDEX: FIB4 SCORE: 1.44

## 2023-09-13 ASSESSMENT — PAIN SCALES - GENERAL: PAIN_LEVEL: 2

## 2023-09-13 NOTE — DISCHARGE INSTRUCTIONS
If any questions arise, call your provider.  If your provider is not available, please feel free to call the [facility] at [phone].    MEDICATIONS: Resume taking daily medication.  Take prescribed pain medication with food.  If no medication is prescribed, you may take non-aspirin pain medication if needed.  PAIN MEDICATION CAN BE VERY CONSTIPATING.  Take a stool softener or laxative such as senokot, pericolace, or milk of magnesia if needed.    Last pain medication given: None    What to Expect Post Anesthesia    Rest and take it easy for the first 24 hours.  A responsible adult is recommended to remain with you during that time.  It is normal to feel sleepy.  We encourage you to not do anything that requires balance, judgment or coordination.    FOR 24 HOURS DO NOT:  Drive, operate machinery or run household appliances.  Drink beer or alcoholic beverages.  Make important decisions or sign legal documents.    To avoid nausea, slowly advance diet as tolerated, avoiding spicy or greasy foods for the first day.  Add more substantial food to your diet according to your provider's instructions.  Babies can be fed formula or breast milk as soon as they are hungry.  INCREASE FLUIDS AND FIBER TO AVOID CONSTIPATION.    MILD FLU-LIKE SYMPTOMS ARE NORMAL.  YOU MAY EXPERIENCE GENERALIZED MUSCLE ACHES, THROAT IRRITATION, HEADACHE AND/OR SOME NAUSEA.      ENDOSCOPY HOME CARE INSTRUCTIONS      COLONOSCOPY OR FLEXIBLE SIGMOIDOSCOPY  1. If you received a barium enema, take a mild laxative such as dulcolax, Amy's M.O., or Milk of Magnesia to clean out the barium.  2. Drink plenty of fluids. Eat a diet high in fiber (whole-grain breads, fresh fruit and vegetables).  3. You may notice a few drops of blood with your first bowel movement. If you develop any large amount of bleeding, black stools, a fever, or abdominal pain, call your doctor right away.    May restart Eliquis tonight    You should call 911 if you develop  problems with breathing or chest pain.    Dr. Fleming [phone]  If any questions arise, call your doctor. If your doctor is not available, please feel free to call [phone].

## 2023-09-13 NOTE — ANESTHESIA POSTPROCEDURE EVALUATION
Patient: Ruby Madgline Emmerling    Procedure Summary     Date: 09/13/23 Room / Location:  ENDOSCOPIC ULTRASOUND ROOM / SURGERY [facility]    Anesthesia Start: 0817 Anesthesia Stop: 0851    Procedure: AVERAGE RISK SCREENING COLONOSCOPY Diagnosis: (COLON POLYP )    Surgeons: Gennaro Fleming M.D. Responsible Provider: Davon Sommer D.O.    Anesthesia Type: general ASA Status: 3          Final Anesthesia Type: general  Last vitals  BP   Blood Pressure : (!) 170/88    Temp   36.4 °C (97.5 °F)    Pulse   75   Resp   18    SpO2   95 %      Anesthesia Post Evaluation    Patient location during evaluation: PACU  Patient participation: complete - patient participated  Level of consciousness: awake and alert  Pain score: 2    Airway patency: patent  Anesthetic complications: no  Cardiovascular status: hemodynamically stable  Respiratory status: acceptable  Hydration status: euvolemic    PONV: none          There were no known notable events for this encounter.     Nurse Pain Score: 0 (NPRS)

## 2023-09-13 NOTE — PROCEDURES
DATE OF PROCEDURE:  09/13/2023     GI PROCEDURE NOTE     PROCEDURE:  Colonoscopy with cold snare polypectomy.     :  Gennaro Fleming MD     INDICATIONS:  The patient is a 78-year-old female who presents for average   risk colon cancer screening.  No prior colonoscopy.  Mother was diagnosed with   colon cancer at 89.     INFORMED CONSENT:  Written informed consent was obtained from the patient   after thorough explanation of indications, benefits, and risks of the   procedure, which included but was not limited to bleeding, infection,   perforation, adverse reaction to medications and missed lesions.     MEDICATIONS GIVEN:  Monitored anesthesia care with propofol.     Continuous telemetry, BP, pulse oximetry, and capnography were performed by   the anesthesiologist throughout the procedure.     An adult colonoscope was used for the procedure.     FINDINGS: The patient was placed in left lateral decubitus position.  After   anesthesia was achieved, a digital rectal exam was performed and found to be   normal.  The endoscope was then inserted into the rectum and advanced to the   cecum, which was identified by the appendiceal orifice and the ileocecal   valve.  It did require abdominal pressure to reach the cecum.  The terminal   ileum was intubated and found to be normal.  The colonoscope was then   withdrawn with careful inspection of mucosa.  She did have 3 sessile polyps in   the ascending colon measuring 4-6 mm in size.  These were all removed by cold   snare polypectomy and placed in bottle A.  She had a 3 mm sessile polyp in   the transverse colon, removed by cold snare polypectomy and placed in bottle   B.  She had a 5 mm sessile polyp in the sigmoid colon, removed by cold snare   and placed in bottle C.  She had mild sigmoid diverticulosis.  She had small   internal hemorrhoids on retroflexion.  The patient underwent a GoLYTELY prep,   the results of which were good.  The patient tolerated the  procedure well.     IMPRESSION:    1.  Five colon polyps, removed.  2.  Mild sigmoid diverticulosis.  3.  Small internal hemorrhoids.     PLAN:    1.  Await pathology.  2.  Likely consider repeat colonoscopy in 3 years depending on overall health.  3.  Resume all home medications including Eliquis.        ______________________________  MD PETRA RODAS/SAM/JAN    DD:  09/13/2023 08:50  DT:  09/13/2023 09:03    Job#:  [id number]

## 2023-09-13 NOTE — OR NURSING
0611 Procedure, patient allergies and NPO status verified. Home med rec completed and belongings secured. Patient verbalizes understanding of pain scale, expected course of stay and plan of care. IV access established.     0742 Preop complete.

## 2023-09-13 NOTE — ANESTHESIA TIME REPORT
Anesthesia Start and Stop Event Times     Date Time Event    9/13/2023 0727 Ready for Procedure     0817 Anesthesia Start     0851 Anesthesia Stop        Responsible Staff  09/13/23    Name Role Begin End    Davon Sommer D.O. Anesth 0817 0851        Overtime Reason:  no overtime (within assigned shift)    Comments:

## 2023-09-13 NOTE — OR NURSING
0848: To PACU post colonoscopy. Pt is awake, breathing is spontaneous and unlabored. No pain or nausea.    0900: Dr. Fleming at bedside.    0920: Pt remains pain/nausea free. Meets criteria for stage ll.

## 2023-09-13 NOTE — OR NURSING
0991 Pt arrived from PACU post AVERAGE RISK SCREENING COLONOSCOPY , report received. Pt states pain is tollerable and denies nausea at this time. Pt dressing @ BS with assistance.    3270 Discharge instructions and medications gone over with Pt and ride. All questions answered. Pt meets DC criteria. PIV DC'd. Pt transported to POV via steady gait in stable condition.

## 2023-09-13 NOTE — OR SURGEON
Immediate Post OP Note    PreOp Diagnosis: Colon cancer screening      PostOp Diagnosis: Colon polyps, diverticulosis      Procedure(s):  AVERAGE RISK SCREENING COLONOSCOPY with cold snare polypectomy- Wound Class: Dirty or Infected    Surgeon(s):  Gennaro Fleming M.D.    Anesthesiologist/Type of Anesthesia:  Anesthesiologist: Davon Sommer D.O./MAC    Surgical Staff:  Circulator: Vandana Vences R.N.; Bishnu Galeano R.N.  Endoscopy Technician: Mark Caldera C.N.A.; Arun Carty; Anne-Marie Beckford    Specimens removed if any:  ID Type Source Tests Collected by Time Destination   A :  Tissue Colon - Ascending PATHOLOGY SPECIMEN Gennaro Fleming M.D. 9/13/2023  8:31 AM    B :  Polyp Colon - Transverse PATHOLOGY SPECIMEN Gennaro Fleming M.D. 9/13/2023  8:36 AM    C :  Polyp Colon - Sigmoid PATHOLOGY SPECIMEN Gennaro Fleming M.D. 9/13/2023  8:42 AM        Estimated Blood Loss: None    Findings:   3 ascending colon polyps, 4-6 mm in size, removed with cold snare  1 transverse colon polyp, 3 mm, removed with cold snare  1 sigmoid colon polyp, 5 mm, removed with cold snare  Mild sigmoid diverticulosis  Small internal hemorrhoids    Complications: None        9/13/2023 8:45 AM Gennaro Fleming M.D.

## 2023-09-13 NOTE — ANESTHESIA PREPROCEDURE EVALUATION
Case: [id number] Date/Time: 09/13/23 0745    Procedure: AVERAGE RISK SCREENING COLONOSCOPY    Pre-op diagnosis: SCREENING FOR COLON CANCER    Location:  ENDOSCOPIC ULTRASOUND ROOM / SURGERY [facility]    Surgeons: Gennaro Fleming M.D.          Relevant Problems   ANESTHESIA   (positive) Obstructive sleep apnea      CARDIAC   (positive) Disorder of arteries and arterioles, unspecified (HCC)   (positive) Essential hypertension_off pharmacotherapy   (positive) Paroxysmal atrial fibrillation (HCC)   (positive) Pulmonary hypertension (HCC) RVSP 30       Physical Exam    Airway   Mallampati: III  TM distance: >3 FB  Neck ROM: full       Cardiovascular - normal exam  Rhythm: regular  Rate: normal  (-) murmur     Dental - normal exam           Pulmonary - normal exam  Breath sounds clear to auscultation     Abdominal    Neurological - normal exam                 Anesthesia Plan    ASA 3   ASA physical status 3 criteria: morbid obesity - BMI greater than or equal to 40    Plan - general       Airway plan will be natural airway          Induction: intravenous    Postoperative Plan: Postoperative administration of opioids is intended.    Pertinent diagnostic labs and testing reviewed    Informed Consent:    Anesthetic plan and risks discussed with patient.    Use of blood products discussed with: patient whom consented to blood products.

## 2023-09-25 ENCOUNTER — TELEPHONE (OUTPATIENT)
Dept: CARDIOLOGY | Facility: MEDICAL CENTER | Age: 78
End: 2023-09-25
Payer: MEDICARE

## 2023-10-18 ENCOUNTER — APPOINTMENT (OUTPATIENT)
Dept: MEDICAL GROUP | Facility: PHYSICIAN GROUP | Age: 78
End: 2023-10-18
Payer: MEDICARE

## 2023-10-23 PROCEDURE — 93228 REMOTE 30 DAY ECG REV/REPORT: CPT | Performed by: INTERNAL MEDICINE

## 2023-12-14 ENCOUNTER — OFFICE VISIT (OUTPATIENT)
Dept: SLEEP MEDICINE | Facility: MEDICAL CENTER | Age: 78
End: 2023-12-14
Attending: PHYSICIAN ASSISTANT
Payer: MEDICARE

## 2023-12-14 VITALS
BODY MASS INDEX: 42.52 KG/M2 | WEIGHT: 240 LBS | DIASTOLIC BLOOD PRESSURE: 80 MMHG | HEIGHT: 63 IN | OXYGEN SATURATION: 97 % | HEART RATE: 63 BPM | RESPIRATION RATE: 16 BRPM | SYSTOLIC BLOOD PRESSURE: 130 MMHG

## 2023-12-14 DIAGNOSIS — G47.33 OSA (OBSTRUCTIVE SLEEP APNEA): ICD-10-CM

## 2023-12-14 PROCEDURE — 99213 OFFICE O/P EST LOW 20 MIN: CPT | Performed by: PHYSICIAN ASSISTANT

## 2023-12-14 PROCEDURE — 3075F SYST BP GE 130 - 139MM HG: CPT | Performed by: PHYSICIAN ASSISTANT

## 2023-12-14 PROCEDURE — 99212 OFFICE O/P EST SF 10 MIN: CPT | Performed by: PHYSICIAN ASSISTANT

## 2023-12-14 PROCEDURE — 3079F DIAST BP 80-89 MM HG: CPT | Performed by: PHYSICIAN ASSISTANT

## 2023-12-14 ASSESSMENT — ENCOUNTER SYMPTOMS
FEVER: 0
SPUTUM PRODUCTION: 0
TREMORS: 0
CHILLS: 0
SORE THROAT: 0
WHEEZING: 0
DIZZINESS: 0
COUGH: 0
INSOMNIA: 0
HEADACHES: 0
WEIGHT LOSS: 0
SINUS PAIN: 0
PALPITATIONS: 1
ORTHOPNEA: 0
SHORTNESS OF BREATH: 0
HEARTBURN: 1

## 2023-12-14 ASSESSMENT — FIBROSIS 4 INDEX: FIB4 SCORE: 1.44

## 2023-12-14 NOTE — PROGRESS NOTES
"Chief Complaint   Patient presents with    Follow-Up     06/14/23 James    Apnea       HPI:  Ruby Madgline Emmerling is a 78 y.o. year old female here today for follow-up on obstructive sleep apnea.  Last seen in clinic 6/14/2023 by Ignacia Ramirez PA-C.  Previously evaluated by Dr. Waters 8/12/2021.    Past Medical History: Paroxysmal atrial fibrillation, secondary hypercoagulable state, morbid obesity, essential hypertension, pulmonary hypertension, chronic low back pain. She is a former smoker with reported 32-pack-year history and quit date in 1998.  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    Vitals:  /80   Pulse 63   Resp 16   Ht 1.6 m (5' 3\")   Wt 109 kg (240 lb)   SpO2 97% BMI of 42.51 kg/m².    Recent Imaging: None    Echocardiogram obtained 9/16/2021 demonstrated normal left ventricular chamber size, wall thickness, systolic and diastolic function. LVEF estimated 70%. Normal right ventricular size and systolic function. Trace mitral regurgitation, trace tricuspid regurgitation, estimated RVSP of 30 mmHg.     Currently using  Resmed auto CPAP @8-16 cm H20 pressure; compliance reviewed for 11/14/2023 through 12/13/2023, days used 30/30, average daily usage 7 hours 53 minutes, 100% of days greater than or equal to 4 hours, mask leak at 18 LPM at 95th percentile, AHI 0.5 per hour.  See media for full report.    Device obtained May 2023  DME provider [facility] [place]  Mask interface hybrid fullface mask    Polysomnogram  obtained 9/17/2021 demonstrated overall AHI of 8.09/h increasing to 30.7/h during REM.  Minimum O2 sat of 78% with sats less than 89% for 22 minutes.  Titration study versus auto CPAP trial recommended.     Overnight home sleep test demonstrated mild obstructive sleep apnea with PAHI 9/h, low O2 sat of 83% with sats at or below 88% for 3.1 minutes of evaluation time.  Patient slept primarily in the supine position.  Recommendation for auto CPAP trial versus updated titration.  "     Sleep schedule goes to bed 7 PM, wakens 3:30 AM , and gets up during the night 1-2 times to use the restroom.   Symptoms experiences daytime somnolence occasionally if doesn't sleep well, denies morning headache.      [place] Sleepiness Scale No data recorded   Stop Bang Score 5 (9/13/2023  6:56 AM)         Review of Systems   Constitutional:  Negative for chills, fever, malaise/fatigue and weight loss.   HENT:  Negative for congestion, hearing loss, nosebleeds, sinus pain, sore throat and tinnitus.    Eyes:         Presc glasses   Respiratory:  Negative for cough, sputum production, shortness of breath and wheezing.    Cardiovascular:  Positive for palpitations and leg swelling (worse in summer). Negative for chest pain and orthopnea.        Following up with cardiology after loop recorder    Gastrointestinal:  Positive for heartburn (rare with spicy food).        Partial upper, missing 2-3 teeth on bottom, no choking or swallowing issues    Neurological:  Negative for dizziness, tremors and headaches.   Psychiatric/Behavioral:  The patient does not have insomnia.        Past Medical History:   Diagnosis Date    Breast lump     lumpectomy    Chickenpox     Dental disorder     upper partials    [place] measles     Heart burn     with spicy foods    Low HDL (under 40) 10/15/2018      Ref. Range 6/25/2021 08:25  Cholesterol,Tot Latest Ref Range: 100 - 199 mg/dL 154  Triglycerides Latest Ref Range: 0 - 149 mg/dL 98  HDL Latest Ref Range: >=40 mg/dL 38 (A)  LDL Latest Ref Range: <100 mg/dL 96   She has history of mild LDL elevation.  She had an abnormal EKG since 2013 with Q waves in anterior leads suggestive of prior anterior infarct.  Nuclear stress test in 2013 did not show     Sleep apnea     uses CPAP    Urinary incontinence        Past Surgical History:   Procedure Laterality Date    [place] COLONOSCOPY,DIAGNOSTIC  9/13/2023    Procedure: AVERAGE RISK SCREENING COLONOSCOPY;  Surgeon: Gennaro Fleming M.D.;   Location: SURGERY [facility];  Service: Gastroenterology    ANKLE FUSION      CHOLECYSTECTOMY         Family History   Problem Relation Age of Onset    Cancer Mother         Uterine/Lymphnode/Metastisized    Heart Disease Sister     Cancer Sister         Uterian    Breast Cancer Maternal Aunt     Cancer Sister         Skin    Other Sister         something eating her from inside out/rotting skin    Suicide Attempts Father     No Known Problems Brother     No Known Problems Brother     Heart Disease Daughter         HO Congenital heart disease    Other Daughter         epilepsy       Social History     Socioeconomic History    Marital status: Single     Spouse name: Not on file    Number of children: Not on file    Years of education: Not on file    Highest education level: Not on file   Occupational History    Not on file   Tobacco Use    Smoking status: Former     Current packs/day: 0.00     Average packs/day: 2.0 packs/day for 32.0 years (64.0 ttl pk-yrs)     Types: Cigarettes     Start date: 1966     Quit date: 1998     Years since quittin.4    Smokeless tobacco: Never   Vaping Use    Vaping Use: Never used   Substance and Sexual Activity    Alcohol use: Not Currently    Drug use: No    Sexual activity: Never     Partners: Male   Other Topics Concern    Not on file   Social History Narrative    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[employer]     Social Determinants of Health     Financial Resource Strain: Not on file   Food Insecurity: Not on file   Transportation Needs: Not on file   Physical Activity: Not on file   Stress: Not on file   Social Connections: Not on file   Intimate Partner Violence: Not on file   Housing Stability: Not on file       Allergies as of 2023 - Reviewed 2023   Allergen Reaction Noted    Demerol Vomiting 2013          Current medications as of today   Current Outpatient Medications   Medication Sig Dispense Refill    ELIQUIS 5 MG Tab TAKE 1 TABLET BY MOUTH TWICE A DAY  180 Tablet 3    FIBER PO Take  by mouth. 2 gummies daily      COLLAGEN PO Take  by mouth. 1 scoop powder daily      MULTIPLE VITAMIN PO Take 1 Tablet by mouth every day.      Multiple Vitamins-Minerals (PRESERVISION AREDS 2 PO) Take 2 Tablets by mouth every day.      Misc Natural Products (FOCUSED MIND PO) Take 2 Capsules by mouth every day.      SUPER B COMPLEX/C PO Take 1 capsule by mouth every day.      MAGNESIUM PO Take 1 Tablet by mouth. Unable to recall dose      vitamin D (CHOLECALCIFEROL) 1000 UNIT Tab Take 1,000 Units by mouth every day.      POTASSIUM PO Take 1 Tablet by mouth every day. Unable to recall dose       No current facility-administered medications for this visit.         Physical Exam:   Gen:           Alert and oriented, No apparent distress. Mood and affect appropriate, normal interaction with examiner.   Hearing:     Grossly intact.  Nose:          Normal, no lesions or deformities.  Dentition:    poor dentition.   Oropharynx:   Tongue normal, posterior pharynx without erythema or exudate.  Mallampati Classification: II/III  Neck:        Supple, trachea midline, no masses.  Respiratory Effort: No intercostal retractions or use of accessory muscles.   Gait and Station: Normal.  Digits and Nails: No clubbing, cyanosis, petechiae, or nodes.   Skin:        No rashes, lesions or ulcers noted.               Ext:           No cyanosis or edema.      Immunizations:  Flu: 10/26/2022  Pneumovax 23: 1/29/2019  Prevnar 13: 12/24/2017  Moderna SARS CoV2 Vaccine: 12/20/2021, 4/10/2021, 3/12/2021    Assessment / Plan:  1. KOFI (obstructive sleep apnea)  - DME Mask and Supplies    Patient likes hybrid fullface mask, trial ResMed or DreamWear versions for top loading feature.  Reviewed compliance which is excellent.  Will send updated mask and supply order to [facility].  We did review equipment cleaning, patient reminded to use distilled water only in humidifier chamber, follow-up in 1 year or sooner if  needed.    2. BMI 40.0-44.9, adult (HCC)    Elevated BMI: Discussion regarding impact central adiposity on pulmonary function.  Encouraged to increase activity as tolerated and monitor nutritional intake.        Follow-up:   Return in about 1 year (around 12/14/2024) for Return with Ignacia Ramirez PA-C.    Please note that this dictation was created using voice recognition software. I have made every reasonable attempt to correct obvious errors, but it is possible there are errors of grammar and possibly content that I did not discover before finalizing the note.

## 2023-12-14 NOTE — PATIENT INSTRUCTIONS
1-likes hybrid full face mask, trial resmed F30i or dreamwear version for top load  2-reviewed compliance which is excellent  3-updated mask and supply order to [facility]  4-Today we reviewed equipment cleaning  once weekly minimum  mask, tubing and water chamber  use dedicated container  use mild soap and water  SoClean or other ozone  are not recommended  white vinegar and water solution is no longer recommended  hang tubing to dry  mask sanitizing wipes are an option for use   5-As a reminder use distilled water only in humidifier chamber.    6-follow up in one year, sooner

## 2023-12-15 ENCOUNTER — HOSPITAL ENCOUNTER (OUTPATIENT)
Dept: RADIOLOGY | Facility: MEDICAL CENTER | Age: 78
End: 2023-12-15
Attending: INTERNAL MEDICINE
Payer: MEDICARE

## 2023-12-15 DIAGNOSIS — Z78.0 MENOPAUSE: ICD-10-CM

## 2023-12-15 DIAGNOSIS — Z12.31 ENCOUNTER FOR SCREENING MAMMOGRAM FOR BREAST CANCER: ICD-10-CM

## 2023-12-15 PROCEDURE — 77080 DXA BONE DENSITY AXIAL: CPT

## 2023-12-15 PROCEDURE — 77063 BREAST TOMOSYNTHESIS BI: CPT

## 2023-12-20 ENCOUNTER — OFFICE VISIT (OUTPATIENT)
Dept: CARDIOLOGY | Facility: MEDICAL CENTER | Age: 78
End: 2023-12-20
Attending: INTERNAL MEDICINE
Payer: MEDICARE

## 2023-12-20 VITALS
HEART RATE: 68 BPM | RESPIRATION RATE: 14 BRPM | SYSTOLIC BLOOD PRESSURE: 124 MMHG | OXYGEN SATURATION: 98 % | HEIGHT: 63 IN | DIASTOLIC BLOOD PRESSURE: 82 MMHG | BODY MASS INDEX: 43.41 KG/M2 | WEIGHT: 245 LBS

## 2023-12-20 DIAGNOSIS — I48.0 PAROXYSMAL ATRIAL FIBRILLATION (HCC): ICD-10-CM

## 2023-12-20 PROCEDURE — 3079F DIAST BP 80-89 MM HG: CPT | Performed by: INTERNAL MEDICINE

## 2023-12-20 PROCEDURE — 93005 ELECTROCARDIOGRAM TRACING: CPT | Performed by: INTERNAL MEDICINE

## 2023-12-20 PROCEDURE — 3074F SYST BP LT 130 MM HG: CPT | Performed by: INTERNAL MEDICINE

## 2023-12-20 PROCEDURE — 99214 OFFICE O/P EST MOD 30 MIN: CPT | Performed by: INTERNAL MEDICINE

## 2023-12-20 PROCEDURE — 99212 OFFICE O/P EST SF 10 MIN: CPT | Performed by: INTERNAL MEDICINE

## 2023-12-20 ASSESSMENT — FIBROSIS 4 INDEX: FIB4 SCORE: 1.44

## 2023-12-20 NOTE — PROGRESS NOTES
Arrhythmia Clinic Note (Established patient)    DOS: 12/20/2023    Chief complaint/Reason for consult: Afib    Interval History: 79 y/o F with low burden asymptomatic pAF on OAC. Here for routine followup, no complaints.     ROS (+ highlighted in bold):  Constitutional: Fevers/chills/fatigue/weightloss  HEENT: Blurry vision/eye pain/sore throat/hearing loss  Respiratory: Shortness of breath/cough  Cardiovascular: Chest pain/palpitations/edema/orthopnea/syncope  GI: Nausea/vomitting/diarrhea  MSK: Arthralgias/myagias/muscle weakness  Skin: Rash/sores  Neurological: Numbness/tremors/vertigo  Endocrine: Excessive thirst/polyuria/cold intolerance/heat intolerance  Psych: Depression/anxiety    Past Medical History:   Diagnosis Date    Breast lump     lumpectomy    Chickenpox     Dental disorder     upper partials    American measles     Heart burn     with spicy foods    Low HDL (under 40) 10/15/2018      Ref. Range 6/25/2021 08:25  Cholesterol,Tot Latest Ref Range: 100 - 199 mg/dL 154  Triglycerides Latest Ref Range: 0 - 149 mg/dL 98  HDL Latest Ref Range: >=40 mg/dL 38 (A)  LDL Latest Ref Range: <100 mg/dL 96   She has history of mild LDL elevation.  She had an abnormal EKG since 2013 with Q waves in anterior leads suggestive of prior anterior infarct.  Nuclear stress test in 2013 did not show     Sleep apnea     uses CPAP    Urinary incontinence        Past Surgical History:   Procedure Laterality Date    [place] COLONOSCOPY,DIAGNOSTIC  9/13/2023    Procedure: AVERAGE RISK SCREENING COLONOSCOPY;  Surgeon: Gennaro Fleming M.D.;  Location: SURGERY [facility];  Service: Gastroenterology    ANKLE FUSION      CHOLECYSTECTOMY         Social History     Socioeconomic History    Marital status: Single     Spouse name: Not on file    Number of children: Not on file    Years of education: Not on file    Highest education level: Not on file   Occupational History    Not on file   Tobacco Use    Smoking status: Former      Current packs/day: 0.00     Average packs/day: 2.0 packs/day for 32.0 years (64.0 ttl pk-yrs)     Types: Cigarettes     Start date: 1966     Quit date: 1998     Years since quittin.5    Smokeless tobacco: Never   Vaping Use    Vaping Use: Never used   Substance and Sexual Activity    Alcohol use: Not Currently    Drug use: No    Sexual activity: Never     Partners: Male   Other Topics Concern    Not on file   Social History Narrative    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[employer]     Social Determinants of Health     Financial Resource Strain: Not on file   Food Insecurity: Not on file   Transportation Needs: Not on file   Physical Activity: Not on file   Stress: Not on file   Social Connections: Not on file   Intimate Partner Violence: Not on file   Housing Stability: Not on file       Family History   Problem Relation Age of Onset    Cancer Mother         Uterine/Lymphnode/Metastisized    Heart Disease Sister     Cancer Sister         Uterian    Breast Cancer Maternal Aunt     Cancer Sister         Skin    Other Sister         something eating her from inside out/rotting skin    Suicide Attempts Father     No Known Problems Brother     No Known Problems Brother     Heart Disease Daughter         HO Congenital heart disease    Other Daughter         epilepsy       Allergies   Allergen Reactions    Demerol Vomiting       Current Outpatient Medications   Medication Sig Dispense Refill    ELIQUIS 5 MG Tab TAKE 1 TABLET BY MOUTH TWICE A  Tablet 3    FIBER PO Take  by mouth. 2 gummies daily      COLLAGEN PO Take  by mouth. 1 scoop powder daily      MULTIPLE VITAMIN PO Take 1 Tablet by mouth every day.      Multiple Vitamins-Minerals (PRESERVISION AREDS 2 PO) Take 2 Tablets by mouth every day.      Misc Natural Products (FOCUSED MIND PO) Take 2 Capsules by mouth every day.      SUPER B COMPLEX/C PO Take 1 capsule by mouth every day.      MAGNESIUM PO Take 1 Tablet by mouth. Unable to recall dose      vitamin D  "(CHOLECALCIFEROL) 1000 UNIT Tab Take 1,000 Units by mouth every day.      POTASSIUM PO Take 1 Tablet by mouth every day. Unable to recall dose       No current facility-administered medications for this visit.       Physical Exam:  Vitals:    12/20/23 0755   BP: 124/82   BP Location: Left arm   Patient Position: Sitting   BP Cuff Size: Large adult   Pulse: 68   Resp: 14   SpO2: 98%   Weight: 111 kg (245 lb)   Height: 1.6 m (5' 3\")     General appearance: NAD, conversant   Eyes: anicteric sclerae, moist conjunctivae; no lid-lag; PERRLA  HENT: Atraumatic; oropharynx clear with moist mucous membranes and no mucosal ulcerations; normal hard and soft palate  Neck: Trachea midline; FROM, supple, no thyromegaly or lymphadenopathy  Lungs: CTA, with normal respiratory effort and no intercostal retractions  CV: RRR, no MRGs, no JVD  Abdomen: Soft, non-tender; no masses or HSM  Extremities: No peripheral edema or extremity lymphadenopathy  Skin: Normal temperature, turgor and texture; no rash, ulcers or subcutaneous nodules  Psych: Appropriate affect, alert and oriented to person, place and time    Data:  Lipids:   Lab Results   Component Value Date/Time    CHOLSTRLTOT 148 04/28/2023 01:09 PM    TRIGLYCERIDE 108 04/28/2023 01:09 PM    HDL 40 04/28/2023 01:09 PM    LDL 86 04/28/2023 01:09 PM        BMP:  Lab Results   Component Value Date/Time    SODIUM 141 04/28/2023 1309    POTASSIUM 4.2 04/28/2023 1309    CHLORIDE 107 04/28/2023 1309    CO2 24 04/28/2023 1309    GLUCOSE 90 04/28/2023 1309    BUN 17 04/28/2023 1309    CREATININE 0.73 04/28/2023 1309    CALCIUM 8.8 04/28/2023 1309    ANION 10.0 04/28/2023 1309        TSH:   Lab Results   Component Value Date/Time    TSHULTRASEN 3.320 04/28/2023 1309        THYROXINE (T4):   No results found for: \"FREEDIR\"     CBC:   Lab Results   Component Value Date/Time    WBC 6.9 04/28/2023 01:09 PM    RBC 4.29 04/28/2023 01:09 PM    HEMOGLOBIN 12.5 04/28/2023 01:09 PM    HEMATOCRIT 37.7 " 04/28/2023 01:09 PM    MCV 87.9 04/28/2023 01:09 PM    MCH 29.1 04/28/2023 01:09 PM    MCHC 33.2 (L) 04/28/2023 01:09 PM    RDW 43.8 04/28/2023 01:09 PM    PLATELETCT 265 04/28/2023 01:09 PM    MPV 9.6 04/28/2023 01:09 PM    NEUTSPOLYS 67.60 04/28/2023 01:09 PM    LYMPHOCYTES 22.70 04/28/2023 01:09 PM    MONOCYTES 7.20 04/28/2023 01:09 PM    EOSINOPHILS 1.70 04/28/2023 01:09 PM    BASOPHILS 0.40 04/28/2023 01:09 PM    IMMGRAN 0.40 04/28/2023 01:09 PM    NRBC 0.00 04/28/2023 01:09 PM    NEUTS 4.66 04/28/2023 01:09 PM    LYMPHS 1.57 04/28/2023 01:09 PM    MONOS 0.50 04/28/2023 01:09 PM    EOS 0.12 04/28/2023 01:09 PM    BASO 0.03 04/28/2023 01:09 PM    IMMGRANAB 0.03 04/28/2023 01:09 PM    NRBCAB 0.00 04/28/2023 01:09 PM        CBC w/o DIFF  Lab Results   Component Value Date/Time    WBC 6.9 04/28/2023 01:09 PM    RBC 4.29 04/28/2023 01:09 PM    HEMOGLOBIN 12.5 04/28/2023 01:09 PM    MCV 87.9 04/28/2023 01:09 PM    MCH 29.1 04/28/2023 01:09 PM    MCHC 33.2 (L) 04/28/2023 01:09 PM    RDW 43.8 04/28/2023 01:09 PM    MPV 9.6 04/28/2023 01:09 PM       Prior echo/stress reviewed: EF 70%    EKG interpreted by me: NSR    Event monitor: 1% burden Afib    Impression/Plan:  1. Paroxysmal atrial fibrillation (HCC)  EKG        pAF  Hypercoagulable state due to afib    - Continue Eliquis for CVA ppx  - Asymptomatic low burden afib, at this time deferring rhythm control    FV 1 year    Kolby Walsh MD  Cardiac Electrophysiology

## 2023-12-23 LAB — EKG IMPRESSION: NORMAL

## 2023-12-23 PROCEDURE — 93010 ELECTROCARDIOGRAM REPORT: CPT | Performed by: INTERNAL MEDICINE

## 2024-02-05 DIAGNOSIS — I48.0 PAROXYSMAL ATRIAL FIBRILLATION (HCC): ICD-10-CM

## 2024-02-05 DIAGNOSIS — D68.69 SECONDARY HYPERCOAGULABLE STATE (HCC): ICD-10-CM

## 2024-02-05 RX ORDER — APIXABAN 5 MG/1
TABLET, FILM COATED ORAL
Qty: 200 TABLET | Refills: 3 | Status: SHIPPED | OUTPATIENT
Start: 2024-02-05

## 2024-02-20 ENCOUNTER — APPOINTMENT (OUTPATIENT)
Dept: MEDICAL GROUP | Facility: PHYSICIAN GROUP | Age: 79
End: 2024-02-20
Payer: MEDICARE

## 2024-02-29 ENCOUNTER — OFFICE VISIT (OUTPATIENT)
Dept: MEDICAL GROUP | Facility: PHYSICIAN GROUP | Age: 79
End: 2024-02-29
Payer: MEDICARE

## 2024-02-29 VITALS
TEMPERATURE: 97 F | HEART RATE: 57 BPM | SYSTOLIC BLOOD PRESSURE: 128 MMHG | DIASTOLIC BLOOD PRESSURE: 64 MMHG | WEIGHT: 246.5 LBS | BODY MASS INDEX: 43.68 KG/M2 | RESPIRATION RATE: 16 BRPM | OXYGEN SATURATION: 97 % | HEIGHT: 63 IN

## 2024-02-29 DIAGNOSIS — D68.69 SECONDARY HYPERCOAGULABLE STATE (HCC): ICD-10-CM

## 2024-02-29 DIAGNOSIS — I48.0 PAROXYSMAL ATRIAL FIBRILLATION (HCC): ICD-10-CM

## 2024-02-29 DIAGNOSIS — D12.6 COLON ADENOMA: ICD-10-CM

## 2024-02-29 DIAGNOSIS — I27.20 PULMONARY HYPERTENSION (HCC): ICD-10-CM

## 2024-02-29 DIAGNOSIS — E66.01 MORBID OBESITY WITH BMI OF 40.0-44.9, ADULT (HCC): ICD-10-CM

## 2024-02-29 DIAGNOSIS — I77.9 DISORDER OF ARTERIES AND ARTERIOLES, UNSPECIFIED (HCC): ICD-10-CM

## 2024-02-29 PROCEDURE — 3074F SYST BP LT 130 MM HG: CPT | Performed by: INTERNAL MEDICINE

## 2024-02-29 PROCEDURE — 99214 OFFICE O/P EST MOD 30 MIN: CPT | Performed by: INTERNAL MEDICINE

## 2024-02-29 PROCEDURE — 3078F DIAST BP <80 MM HG: CPT | Performed by: INTERNAL MEDICINE

## 2024-02-29 ASSESSMENT — PATIENT HEALTH QUESTIONNAIRE - PHQ9: CLINICAL INTERPRETATION OF PHQ2 SCORE: 0

## 2024-02-29 ASSESSMENT — FIBROSIS 4 INDEX: FIB4 SCORE: 1.44

## 2024-02-29 NOTE — ASSESSMENT & PLAN NOTE
Previous problem identified on screening test.  She has no claudication and is walking daily, continue with observation.

## 2024-02-29 NOTE — ASSESSMENT & PLAN NOTE
Chronic stable problem, episodes of atrial fibrillation continue to decline, she wears her Apple Watch and notifies her when she goes out of rhythm.  No medication for rate or rhythm control at this time.  She is compliant with anticoagulation with apixaban 5 mg twice daily.

## 2024-02-29 NOTE — LETTER
February 29, 2024        Luda Topete Emmerling  [address]  [place] [address]        To whom it may concern,      I saw Ms. Emmerling in clinic on 2/29/2024 for routine follow up.           If you have any questions or concerns, please don't hesitate to call.          Sincerely,          Kenna Alamo D.O.    Electronically Signed

## 2024-02-29 NOTE — ASSESSMENT & PLAN NOTE
Chronic stable problem, continues to be appropriate for oral anticoagulation due to elevated [id number] score, no significant bruising or bleeding concerns at this time, continue apixaban 5 mg twice daily.

## 2024-02-29 NOTE — ASSESSMENT & PLAN NOTE
Chronic ongoing problem, she has started walking now and notes her balance is improving and I encouraged her to continue with regular exercise.

## 2024-02-29 NOTE — ASSESSMENT & PLAN NOTE
New problem, 5 adenomas identified on screening colonoscopy, encouraged her to follow-up in 3 years with Dr. Fleming due to number and type of polyps.

## 2024-02-29 NOTE — PROGRESS NOTES
Subjective:   Chief Complaint/History of Present Illness:  Ruby Madgline Emmerling is a 78 y.o. female established patient who presents today to discuss medical problems as listed below. Luda is unaccompanied for today's visit.    Problem   Colon Adenoma    She underwent screening colonoscopy in September 2020 through Dr. Fleming.  She had 5 adenomas detected and was recommended to have repeat colonoscopy in 3 years which would be fall 2026.     Pulmonary hypertension (HCC) RVSP 30    On echocardiogram at time of atrial fibrillation diagnosis she was noted to have mild pulmonary hypertension with RVSP of 30.  This is likely related to untreated sleep apnea and history of hypertension.     Secondary Hypercoagulable State (Hcc)    She has paroxysmal atrial fibrillation.  Recommended to go on systemic anticoagulation however she is happening pretty coverages not affordable.  She will plan to have coverage as of January 2022.  She continues on aspirin 81 mg in the meantime.    Initial [id number] score was 4 (history of MI, hypertension, age, gender) however stress test and echocardiogram did not show any evidence of prior MI and her blood pressure is controlled without pharmacotherapy so at this moment is closer to 2.    Current regimen: Eliquis 5 mg twice daily     Paroxysmal Atrial Fibrillation (Hcc)    She presents to clinic to review concerns over new onset atrial fibrillation.  She has an apple smart watch and on 2 occasions has been alerted of irregular rhythm.  These events were on April 25 and Marilee 10 and again in September while she was sleeping.  Normally occurs while she is supine in bed resting.  Heart rate goes as high as the mid 140s.  She is symptomatic with feeling restless.  No personal history and no prior evaluation.     She has history of morbid obesity, evaluation for sleep apnea was positive and she is working with sleep medicine on positive pressure therapy.  Former smoker.  She has moderate  alcohol consumption.  She had a reassuring stress test in 2013 but no recent cardiac evaluation.  No other history of lung disease. Lab work negative for thyroid disease, anemia, or electrolyte abnormality.    On follow-up in March 2023 she reports more symptomatic palpitations with heart rate up to the 140s.  She is not sure if she would want to do an ablation but may be open to medications if needed.  S    She met with Dr. Walsh of electrophysiology in August 2023 and he recommends 30-day Biotel monitor to look at burden of paroxysmal atrial fibrillation.  Appropriate continue with oral anticoagulation but no rate or rhythm medicines indicated at this time.     Disorder of Arteries and Arterioles, Unspecified (Hcc)    She had an abnormal screening test with right lower extremity 0.8 left lower extremity 0.83 on Quanta flow.  She is a former smoker of approximately 2 packs/day.  She has history of hyperlipidemia, hypertension, and morbid obesity.  No clear claudication at this time.     Morbid Obesity With Bmi of 40.0-44.9, Adult ([facility])    She has elevated weight of 246 lb with BMI of 43.67.          Current Medications:  Current Outpatient Medications Ordered in Epic   Medication Sig Dispense Refill    apixaban (ELIQUIS) 5mg Tab TAKE 1 TABLET BY MOUTH TWICE A  Tablet 3    FIBER PO Take  by mouth. 2 gummies daily      COLLAGEN PO Take  by mouth. 1 scoop powder daily      MULTIPLE VITAMIN PO Take 1 Tablet by mouth every day.      Multiple Vitamins-Minerals (PRESERVISION AREDS 2 PO) Take 2 Tablets by mouth every day.      Misc Natural Products (FOCUSED MIND PO) Take 2 Capsules by mouth every day.      SUPER B COMPLEX/C PO Take 1 capsule by mouth every day.      MAGNESIUM PO Take 1 Tablet by mouth. Unable to recall dose      vitamin D (CHOLECALCIFEROL) 1000 UNIT Tab Take 1,000 Units by mouth every day.      POTASSIUM PO Take 1 Tablet by mouth every day. Unable to recall dose       No current Epic-ordered  "facility-administered medications on file.          Objective:   Physical Exam:    Vitals: /64 (BP Location: Right arm, Patient Position: Sitting, BP Cuff Size: Large adult)   Pulse (!) 57   Temp 36.1 °C (97 °F) (Temporal)   Resp 16   Ht 1.6 m (5' 3\")   Wt 112 kg (246 lb 8 oz)   SpO2 97%    BMI: Body mass index is 43.67 kg/m².  Physical Exam  Constitutional:       General: She is not in acute distress.     Appearance: Normal appearance. She is not ill-appearing.   HENT:      Right Ear: Ear canal and external ear normal. There is no impacted cerumen.      Left Ear: Ear canal and external ear normal. There is no impacted cerumen.   Eyes:      General: No scleral icterus.     Conjunctiva/sclera: Conjunctivae normal.   Cardiovascular:      Rate and Rhythm: Normal rate and regular rhythm.      Pulses: Normal pulses.   Pulmonary:      Effort: Pulmonary effort is normal. No respiratory distress.      Breath sounds: No wheezing or rhonchi.   Abdominal:      General: Bowel sounds are normal.      Palpations: Abdomen is soft.   Lymphadenopathy:      Cervical: No cervical adenopathy.   Skin:     General: Skin is warm and dry.      Findings: No rash.   Neurological:      Gait: Gait normal.   Psychiatric:         Mood and Affect: Mood normal.         Behavior: Behavior normal.         Thought Content: Thought content normal.         Judgment: Judgment normal.               Assessment and Plan:   Luda is a 78 y.o. female with the following:  Problem List Items Addressed This Visit       Colon adenoma     New problem, 5 adenomas identified on screening colonoscopy, encouraged her to follow-up in 3 years with Dr. Fleming due to number and type of polyps.         Disorder of arteries and arterioles, unspecified (HCC)     Previous problem identified on screening test.  She has no claudication and is walking daily, continue with observation.         Morbid obesity with BMI of 40.0-44.9, adult (HCC)     Chronic ongoing " problem, she has started walking now and notes her balance is improving and I encouraged her to continue with regular exercise.         Paroxysmal atrial fibrillation (HCC)     Chronic stable problem, episodes of atrial fibrillation continue to decline, she wears her Apple Watch and notifies her when she goes out of rhythm.  No medication for rate or rhythm control at this time.  She is compliant with anticoagulation with apixaban 5 mg twice daily.         Pulmonary hypertension (HCC) RVSP 30     Chronic stable problem, likely related to prior history of hypertension and untreated sleep apnea, could consider follow-up in 2025 to ensure ongoing stability.  No issues with hypertension and she is euvolemic.         Secondary hypercoagulable state (HCC)     Chronic stable problem, continues to be appropriate for oral anticoagulation due to elevated [id number] score, no significant bruising or bleeding concerns at this time, continue apixaban 5 mg twice daily.               RTC: Return in about 6 months (around 8/29/2024).    I spent a total of 34 minutes with record review, exam, communication with the patient, communication with other providers, and documentation of this encounter.    PLEASE NOTE: This dictation was created using voice recognition software. I have made every reasonable attempt to correct obvious errors, but I expect that there are errors of grammar and possibly content that I did not discover before finalizing the note.      Kenna Alamo, DO  Geriatric and Internal Medicine  [facility]

## 2024-02-29 NOTE — ASSESSMENT & PLAN NOTE
Chronic stable problem, likely related to prior history of hypertension and untreated sleep apnea, could consider follow-up in 2025 to ensure ongoing stability.  No issues with hypertension and she is euvolemic.

## 2024-04-03 ENCOUNTER — TELEPHONE (OUTPATIENT)
Dept: HEALTH INFORMATION MANAGEMENT | Facility: OTHER | Age: 79
End: 2024-04-03
Payer: MEDICARE

## 2024-04-24 ENCOUNTER — HOSPITAL ENCOUNTER (OUTPATIENT)
Dept: LAB | Facility: MEDICAL CENTER | Age: 79
End: 2024-04-24
Attending: INTERNAL MEDICINE
Payer: MEDICARE

## 2024-04-24 DIAGNOSIS — E78.6 LOW HDL (UNDER 40): ICD-10-CM

## 2024-04-24 DIAGNOSIS — I48.0 PAROXYSMAL ATRIAL FIBRILLATION (HCC): ICD-10-CM

## 2024-04-24 LAB
25(OH)D3 SERPL-MCNC: 35 NG/ML (ref 30–100)
ALBUMIN SERPL BCP-MCNC: 4.5 G/DL (ref 3.2–4.9)
ALBUMIN/GLOB SERPL: 2 G/DL
ALP SERPL-CCNC: 76 U/L (ref 30–99)
ALT SERPL-CCNC: 33 U/L (ref 2–50)
ANION GAP SERPL CALC-SCNC: 12 MMOL/L (ref 7–16)
AST SERPL-CCNC: 28 U/L (ref 12–45)
BASOPHILS # BLD AUTO: 0.5 % (ref 0–1.8)
BASOPHILS # BLD: 0.03 K/UL (ref 0–0.12)
BILIRUB SERPL-MCNC: 0.9 MG/DL (ref 0.1–1.5)
BUN SERPL-MCNC: 25 MG/DL (ref 8–22)
CALCIUM ALBUM COR SERPL-MCNC: 8.6 MG/DL (ref 8.5–10.5)
CALCIUM SERPL-MCNC: 9 MG/DL (ref 8.5–10.5)
CHLORIDE SERPL-SCNC: 108 MMOL/L (ref 96–112)
CHOLEST SERPL-MCNC: 153 MG/DL (ref 100–199)
CO2 SERPL-SCNC: 23 MMOL/L (ref 20–33)
CREAT SERPL-MCNC: 0.68 MG/DL (ref 0.5–1.4)
EOSINOPHIL # BLD AUTO: 0.2 K/UL (ref 0–0.51)
EOSINOPHIL NFR BLD: 3 % (ref 0–6.9)
ERYTHROCYTE [DISTWIDTH] IN BLOOD BY AUTOMATED COUNT: 45.2 FL (ref 35.9–50)
GFR SERPLBLD CREATININE-BSD FMLA CKD-EPI: 90 ML/MIN/1.73 M 2
GLOBULIN SER CALC-MCNC: 2.2 G/DL (ref 1.9–3.5)
GLUCOSE SERPL-MCNC: 104 MG/DL (ref 65–99)
HCT VFR BLD AUTO: 39.1 % (ref 37–47)
HDLC SERPL-MCNC: 45 MG/DL
HGB BLD-MCNC: 12.9 G/DL (ref 12–16)
IMM GRANULOCYTES # BLD AUTO: 0.02 K/UL (ref 0–0.11)
IMM GRANULOCYTES NFR BLD AUTO: 0.3 % (ref 0–0.9)
LDLC SERPL CALC-MCNC: 85 MG/DL
LYMPHOCYTES # BLD AUTO: 1.69 K/UL (ref 1–4.8)
LYMPHOCYTES NFR BLD: 25.6 % (ref 22–41)
MCH RBC QN AUTO: 29.1 PG (ref 27–33)
MCHC RBC AUTO-ENTMCNC: 33 G/DL (ref 32.2–35.5)
MCV RBC AUTO: 88.1 FL (ref 81.4–97.8)
MONOCYTES # BLD AUTO: 0.48 K/UL (ref 0–0.85)
MONOCYTES NFR BLD AUTO: 7.3 % (ref 0–13.4)
NEUTROPHILS # BLD AUTO: 4.18 K/UL (ref 1.82–7.42)
NEUTROPHILS NFR BLD: 63.3 % (ref 44–72)
NRBC # BLD AUTO: 0 K/UL
NRBC BLD-RTO: 0 /100 WBC (ref 0–0.2)
PLATELET # BLD AUTO: 266 K/UL (ref 164–446)
PMV BLD AUTO: 10.5 FL (ref 9–12.9)
POTASSIUM SERPL-SCNC: 5 MMOL/L (ref 3.6–5.5)
PROT SERPL-MCNC: 6.7 G/DL (ref 6–8.2)
RBC # BLD AUTO: 4.44 M/UL (ref 4.2–5.4)
SODIUM SERPL-SCNC: 143 MMOL/L (ref 135–145)
T4 FREE SERPL-MCNC: 0.97 NG/DL (ref 0.93–1.7)
TRIGL SERPL-MCNC: 116 MG/DL (ref 0–149)
TSH SERPL DL<=0.005 MIU/L-ACNC: 2.78 UIU/ML (ref 0.38–5.33)
VIT B12 SERPL-MCNC: 830 PG/ML (ref 211–911)
WBC # BLD AUTO: 6.6 K/UL (ref 4.8–10.8)

## 2024-04-24 PROCEDURE — 36415 COLL VENOUS BLD VENIPUNCTURE: CPT

## 2024-04-24 PROCEDURE — 84439 ASSAY OF FREE THYROXINE: CPT

## 2024-04-24 PROCEDURE — 85025 COMPLETE CBC W/AUTO DIFF WBC: CPT

## 2024-04-24 PROCEDURE — 82607 VITAMIN B-12: CPT

## 2024-04-24 PROCEDURE — 82306 VITAMIN D 25 HYDROXY: CPT

## 2024-04-24 PROCEDURE — 84443 ASSAY THYROID STIM HORMONE: CPT

## 2024-04-24 PROCEDURE — 80053 COMPREHEN METABOLIC PANEL: CPT

## 2024-04-24 PROCEDURE — 80061 LIPID PANEL: CPT

## 2024-07-26 ASSESSMENT — PATIENT HEALTH QUESTIONNAIRE - PHQ9
2. FEELING DOWN, DEPRESSED, IRRITABLE, OR HOPELESS: SEVERAL DAYS
1. LITTLE INTEREST OR PLEASURE IN DOING THINGS: NOT AT ALL

## 2024-07-26 ASSESSMENT — ENCOUNTER SYMPTOMS: GENERAL WELL-BEING: EXCELLENT

## 2024-07-26 ASSESSMENT — ACTIVITIES OF DAILY LIVING (ADL): BATHING_REQUIRES_ASSISTANCE: 0

## 2024-08-01 PROBLEM — Z86.0100 HISTORY OF COLON POLYPS: Status: ACTIVE | Noted: 2023-09-13

## 2024-08-01 PROBLEM — Z86.010 HISTORY OF COLON POLYPS: Status: ACTIVE | Noted: 2023-09-13

## 2024-08-01 PROBLEM — I77.810 ECTATIC THORACIC AORTA (HCC): Status: ACTIVE | Noted: 2024-08-01

## 2024-08-01 NOTE — ASSESSMENT & PLAN NOTE
Noted on 2021 echocardiogram with finding of ascending aorta measuring 3.5cm. Continue to monitor. Follow up with PCP.

## 2024-08-01 NOTE — ASSESSMENT & PLAN NOTE
Diagnosed via findings with screening Quantaflo study that suggested mild atherosclerotic burden in lower extremities. Pt does have a hx of smoking, HLD, and HTN increasing risk factors for peripheral arterial dz as well. Pt denies claudication. Continue to monitor. Follow up with PCP.

## 2024-08-01 NOTE — ASSESSMENT & PLAN NOTE
Chronic, stable. Pt maintains Eliquis 5mg BID for stroke prophylaxis. Follow up with cardiology per routine for continued monitoring and management.

## 2024-08-01 NOTE — ASSESSMENT & PLAN NOTE
Chronic, stable. Noted on 2021 echocardiogram with RVSP mildly elevated at 30. Pt denies dyspnea. Follow up with PCP.

## 2024-08-01 NOTE — ASSESSMENT & PLAN NOTE
Chronic, ongoing, secondary to hx of PAF. Pt maintains on Eliquis 5mg BID for stroke prophylaxis. Follow up with cardiology at least annually for continued monitoring and management.

## 2024-08-01 NOTE — ASSESSMENT & PLAN NOTE
Chronic, ongoing. BMI today 44.99. Associated with KOFI, hx of HTN. Continue to encourage healthy calorie conscious diet with regular physical activity with goal of weight loss. Follow up with PCP at least annually for continued monitoring and management.

## 2024-08-02 ENCOUNTER — APPOINTMENT (OUTPATIENT)
Dept: FAMILY PLANNING/WOMEN'S HEALTH CLINIC | Facility: PHYSICIAN GROUP | Age: 79
End: 2024-08-02
Attending: FAMILY MEDICINE
Payer: MEDICARE

## 2024-08-02 VITALS
DIASTOLIC BLOOD PRESSURE: 62 MMHG | WEIGHT: 254 LBS | SYSTOLIC BLOOD PRESSURE: 122 MMHG | HEIGHT: 63 IN | BODY MASS INDEX: 45 KG/M2

## 2024-08-02 DIAGNOSIS — D68.69 SECONDARY HYPERCOAGULABLE STATE (HCC): ICD-10-CM

## 2024-08-02 DIAGNOSIS — I77.810 ECTATIC THORACIC AORTA (HCC): ICD-10-CM

## 2024-08-02 DIAGNOSIS — E66.01 MORBID OBESITY WITH BMI OF 40.0-44.9, ADULT (HCC): ICD-10-CM

## 2024-08-02 DIAGNOSIS — I48.0 PAROXYSMAL ATRIAL FIBRILLATION (HCC): ICD-10-CM

## 2024-08-02 DIAGNOSIS — I27.20 PULMONARY HYPERTENSION (HCC): ICD-10-CM

## 2024-08-02 DIAGNOSIS — I77.9 DISORDER OF ARTERIES AND ARTERIOLES, UNSPECIFIED (HCC): ICD-10-CM

## 2024-08-02 DIAGNOSIS — G47.33 OBSTRUCTIVE SLEEP APNEA: ICD-10-CM

## 2024-08-02 DIAGNOSIS — I10 ESSENTIAL HYPERTENSION: ICD-10-CM

## 2024-08-02 PROCEDURE — 1126F AMNT PAIN NOTED NONE PRSNT: CPT | Performed by: PHYSICIAN ASSISTANT

## 2024-08-02 PROCEDURE — 3074F SYST BP LT 130 MM HG: CPT | Performed by: PHYSICIAN ASSISTANT

## 2024-08-02 PROCEDURE — 3078F DIAST BP <80 MM HG: CPT | Performed by: PHYSICIAN ASSISTANT

## 2024-08-02 PROCEDURE — G0439 PPPS, SUBSEQ VISIT: HCPCS | Performed by: PHYSICIAN ASSISTANT

## 2024-08-02 SDOH — ECONOMIC STABILITY: FOOD INSECURITY: WITHIN THE PAST 12 MONTHS, THE FOOD YOU BOUGHT JUST DIDN'T LAST AND YOU DIDN'T HAVE MONEY TO GET MORE.: NEVER TRUE

## 2024-08-02 SDOH — ECONOMIC STABILITY: INCOME INSECURITY: HOW HARD IS IT FOR YOU TO PAY FOR THE VERY BASICS LIKE FOOD, HOUSING, MEDICAL CARE, AND HEATING?: NOT HARD AT ALL

## 2024-08-02 SDOH — ECONOMIC STABILITY: TRANSPORTATION INSECURITY
IN THE PAST 12 MONTHS, HAS LACK OF TRANSPORTATION KEPT YOU FROM MEETINGS, WORK, OR FROM GETTING THINGS NEEDED FOR DAILY LIVING?: NO

## 2024-08-02 SDOH — ECONOMIC STABILITY: TRANSPORTATION INSECURITY
IN THE PAST 12 MONTHS, HAS THE LACK OF TRANSPORTATION KEPT YOU FROM MEDICAL APPOINTMENTS OR FROM GETTING MEDICATIONS?: NO

## 2024-08-02 SDOH — ECONOMIC STABILITY: FOOD INSECURITY: WITHIN THE PAST 12 MONTHS, YOU WORRIED THAT YOUR FOOD WOULD RUN OUT BEFORE YOU GOT MONEY TO BUY MORE.: NEVER TRUE

## 2024-08-02 ASSESSMENT — PATIENT HEALTH QUESTIONNAIRE - PHQ9
CLINICAL INTERPRETATION OF PHQ2 SCORE: 1
5. POOR APPETITE OR OVEREATING: 1 - SEVERAL DAYS
SUM OF ALL RESPONSES TO PHQ QUESTIONS 1-9: 4

## 2024-08-02 ASSESSMENT — FIBROSIS 4 INDEX: FIB4 SCORE: 1.41

## 2024-08-02 ASSESSMENT — PAIN SCALES - GENERAL: PAINLEVEL: NO PAIN

## 2024-08-02 NOTE — LETTER
[facility] HEALTH ASSESSMENT PROGRAM [facility]     August 2, 2024    Patient: Ruby Madgline Emmerling   YOB: 1945   Date of Visit: 8/2/2024       To Whom It May Concern:    Ruby Emmerling was seen and treated in our department on 8/2/2024. Please excuse her time missed from work as she will be returning. If you have any questions or concerns you can contact me at [phone].     Sincerely,     GUILLERMO Perez.

## 2024-08-02 NOTE — PROGRESS NOTES
Comprehensive Health Assessment Program     Ruby Madgline Emmerling is a 79 y.o. here for her comprehensive health assessment.    Patient Active Problem List    Diagnosis Date Noted    Ectatic thoracic aorta (HCC) 2024    History of colon polyps 2023    Pulmonary hypertension (HCC) RVSP 30 10/26/2022    Pain of right hand 10/26/2022    Obstructive sleep apnea 2022    Secondary hypercoagulable state (HCC) 2021    Paroxysmal atrial fibrillation (HCC) 2021    Disorder of arteries and arterioles, unspecified ([facility]) 2021    Essential hypertension_off pharmacotherapy 10/15/2018    Chronic bilateral low back pain without sciatica 10/15/2018    Morbid obesity with BMI of 40.0-44.9, adult ([facility]) 06/15/2017       Current Outpatient Medications   Medication Sig Dispense Refill    apixaban (ELIQUIS) 5mg Tab TAKE 1 TABLET BY MOUTH TWICE A  Tablet 3    FIBER PO Take  by mouth. 2 gummies daily      COLLAGEN PO Take  by mouth. 1 scoop powder daily      MULTIPLE VITAMIN PO Take 1 Tablet by mouth every day.      Multiple Vitamins-Minerals (PRESERVISION AREDS 2 PO) Take 2 Tablets by mouth every day.      Misc Natural Products (FOCUSED MIND PO) Take 2 Capsules by mouth every day.      SUPER B COMPLEX/C PO Take 1 capsule by mouth every day.      MAGNESIUM PO Take 1 Tablet by mouth. Unable to recall dose      vitamin D (CHOLECALCIFEROL) 1000 UNIT Tab Take 1,000 Units by mouth every day.      POTASSIUM PO Take 1 Tablet by mouth every day. Unable to recall dose       No current facility-administered medications for this visit.          Current supplements as per medication list.     Allergies:   Demerol  Social History     Tobacco Use    Smoking status: Former     Current packs/day: 0.00     Average packs/day: 2.0 packs/day for 32.0 years (64.0 ttl pk-yrs)     Types: Cigarettes     Start date: 1966     Quit date: 1998     Years since quittin.1    Smokeless tobacco: Never   Vaping  Use    Vaping status: Never Used   Substance Use Topics    Alcohol use: Not Currently    Drug use: No     Family History   Problem Relation Age of Onset    Cancer Mother         Uterine/Lymphnode/Metastisized    Heart Disease Sister     Cancer Sister         Uterian    Breast Cancer Maternal Aunt     Cancer Sister         Skin    Other Sister         something eating her from inside out/rotting skin    Suicide Attempts Father     No Known Problems Brother     No Known Problems Brother     Heart Disease Daughter         HO Congenital heart disease    Other Daughter         epilepsy     Luda  has a past medical history of Breast lump, Chickenpox, Dental disorder, [place] citizen measles, Heart burn, Low HDL (under 40) (10/15/2018), Sleep apnea, and Urinary incontinence.    She has no past medical history of Diabetes or Unspecified hemorrhagic conditions.   Past Surgical History:   Procedure Laterality Date    [place] COLONOSCOPY,DIAGNOSTIC  9/13/2023    Procedure: AVERAGE RISK SCREENING COLONOSCOPY;  Surgeon: Gennaro Fleming M.D.;  Location: SURGERY [facility];  Service: Gastroenterology    ANKLE FUSION      CHOLECYSTECTOMY         Screening:  In the last six months have you experienced any leakage of urine? Yes, urgency in morning    Depression Screening  Little interest or pleasure in doing things?  0 - not at all  Feeling down, depressed , or hopeless? 1 - several days  Trouble falling or staying asleep, or sleeping too much?  0 - not at all  Feeling tired or having little energy?  1 - several days  Poor appetite or overeating?  1 - several days  Feeling bad about yourself - or that you are a failure or have let yourself or your family down? 1 - several days  Trouble concentrating on things, such as reading the newspaper or watching television? 0 - not at all  Moving or speaking so slowly that other people could have noticed.  Or the opposite - being so fidgety or restless that you have been moving around a lot more than  usual?  0 - not at all  Thoughts that you would be better off dead, or of hurting yourself?  0 - not at all  Patient Health Questionnaire Score: 4    If depressive symptoms identified deferred to follow up visit unless specifically addressed in assessment and plan.    Interpretation of PHQ-9 Total Score   Score Severity   1-4 No Depression   5-9 Mild Depression   10-14 Moderate Depression   15-19 Moderately Severe Depression   20-27 Severe Depression    Screening for Cognitive Impairment  Do you or any of your friends or family members have any concern about your memory? No  Three Minute Recall (Leader, Season, Table) 3/3    Gonzalo clock face with all 12 numbers and set the hands to show 10 minutes after 11.  Yes 5  Cognitive concerns identified deferred for follow up unless specifically addressed in assessment and plan.    Fall Risk Assessment  Has the patient had two or more falls in the last year or any fall with injury in the last year?  No    Safety Assessment  Do you always wear your seatbelt?  Yes  Any changes to home needed to function safely? No  Difficulty hearing.  No  Patient counseled about all safety risks that were identified.    Functional Assessment ADLs  Are there any barriers preventing you from cooking for yourself or meeting nutritional needs?  No.    Are there any barriers preventing you from driving safely or obtaining transportation?  No.    Are there any barriers preventing you from using a telephone or calling for help?  No    Are there any barriers preventing you from shopping?  No.    Are there any barriers preventing you from taking care of your own finances?  No    Are there any barriers preventing you from managing your medications?  No    Are there any barriers preventing you from showering, bathing or dressing yourself? No    Are there any barriers preventing you from doing housework or laundry? No    Are there any barriers preventing you from using the toilet?No    Are you currently  engaging in any exercise or physical activity?  Yes. Walking daily 25-45 minutes    Self-Assessment of Health  What is your perception of your health? Excellent    Do you sleep more than six hours a night? Yes    In the past 7 days, how much did pain keep you from doing your normal work? None    Do you spend quality time with family or friends (virtually or in person)? Yes    Do you usually eat a heart healthy diet that constists of a variety of fruits, vegetables, whole grains and fiber? Yes    Do you eat foods high in fat and/or Fast Food more than three times per week? No    How concerned are you that your medical conditions are not being well managed? Not at all    Are you worried that in the next 2 months, you may not have stable housing that you own, rent, or stay in as part of a household? No        Advance Care Planning  Do you have an Advance Directive, Living Will, Durable Power of , or POLST? No  Provided patient with educational brochure regarding Advance Care Planning.                    Health Maintenance Summary            Overdue - COVID-19 Vaccine (4 - 2023-24 season) Overdue since 9/1/2023 12/20/2021  Imm Admin: MODERNA SARS-COV-2 VACCINE (12+)    04/10/2021  Imm Admin: MODERNA SARS-COV-2 VACCINE (12+)    03/12/2021  Imm Admin: MODERNA SARS-COV-2 VACCINE (12+)              Influenza Vaccine (1) Next due on 9/1/2024 09/16/2023  Imm Admin: Influenza Vaccine Adult HD    10/26/2022  Imm Admin: Influenza Vaccine Adult HD    09/23/2021  Imm Admin: Influenza Vaccine Adult HD    10/10/2020  Imm Admin: Influenza Vaccine Quad Recombinant    11/08/2019  Imm Admin: Influenza Vaccine Adult HD    Only the first 5 history entries have been loaded, but more history exists.              Mammogram (Yearly) Next due on 12/15/2024      12/15/2023  MA-SCREENING MAMMO BILAT W/TOMOSYNTHESIS W/CAD    11/15/2022  MA-SCREENING MAMMO BILAT W/TOMOSYNTHESIS W/CAD    07/02/2021  MA-SCREENING MAMMO BILAT  W/TOMOSYNTHESIS W/CAD    11/28/2018  MA-SCREENING MAMMO BILAT W/TOMOSYNTHESIS W/CAD              Annual Wellness Visit (Yearly) Next due on 8/2/2025 08/02/2024  Level of Service: ANNUAL WELLNESS VISIT-INCLUDES PPPS SUBSEQUE*    06/16/2023  Level of Service: TX ANNUAL WELLNESS VISIT-INCLUDES PPPS SUBSEQUE*    05/20/2022  Level of Service: TX ANNUAL WELLNESS VISIT-INCLUDES PPPS SUBSEQUE*    06/22/2021  Level of Service: TX ANNUAL WELLNESS VISIT-INCLUDES PPPS SUBSEQUE*    01/21/2019  Initial Annual Wellness Visit - Includes PPPS ()    Only the first 5 history entries have been loaded, but more history exists.              Colorectal Cancer Screening (Colonoscopy - Every 3 Years) Next due on 9/13/2026 09/13/2023  Surgical Procedure: [place] COLONOSCOPY,DIAGNOSTIC    07/06/2021  OCCULT BLOOD FECES IMMUNOASSAY (FIT)    11/12/2018  OCCULT BLOOD FECES IMMUNOASSAY              Bone Density Scan (Every 5 Years) Next due on 12/15/2028      12/15/2023  DS-BONE DENSITY STUDY (DEXA)    11/28/2018  DS-BONE DENSITY STUDY (DEXA)              IMM DTaP/Tdap/Td Vaccine (2 - Td or Tdap) Next due on 7/8/2031 07/08/2021  Imm Admin: Tdap Vaccine              Pneumococcal Vaccine: 65+ Years (Series Information) Completed      01/29/2019  Imm Admin: Pneumococcal polysaccharide vaccine (PPSV-23)    12/04/2017  Imm Admin: Pneumococcal Conjugate Vaccine (Prevnar/PCV-13)              Hepatitis C Screening  Tentatively Complete      06/25/2021  Hepatitis C Antibody component of HEP C VIRUS ANTIBODY              Zoster (Shingles) Vaccines (Series Information) Completed      09/23/2021  Imm Admin: Zoster Vaccine Recombinant (RZV) (SHINGRIX)    07/08/2021  Imm Admin: Zoster Vaccine Recombinant (RZV) (SHINGRIX)              Hepatitis A Vaccine (Hep A) (Series Information) Aged Out      No completion history exists for this topic.              HPV Vaccines (Series Information) Aged Out      No completion history exists for this topic.  "             Polio Vaccine (Inactivated Polio) (Series Information) Aged Out      No completion history exists for this topic.              Meningococcal Immunization (Series Information) Aged Out      No completion history exists for this topic.              Discontinued - Hepatitis B Vaccine (Hep B)  Discontinued      No completion history exists for this topic.                    Patient Care Team:  Kenna Alamo D.O. as PCP - General (Internal Medicine)  Baltazar Raphael Ass't as    Rene as Respiratory Therapist (DME Supplier)  Ignacia Ramirez P.A.-C. (Sleep Medicine)    Financial Resource Strain: Low Risk  (8/2/2024)    Overall Financial Resource Strain (CARDIA)     Difficulty of Paying Living Expenses: Not hard at all      Transportation Needs: No Transportation Needs (8/2/2024)    PRAPARE - Transportation     Lack of Transportation (Medical): No     Lack of Transportation (Non-Medical): No      Food Insecurity: No Food Insecurity (8/2/2024)    Hunger Vital Sign     Worried About Running Out of Food in the Last Year: Never true     Ran Out of Food in the Last Year: Never true        Encounter Vitals  Blood Pressure : 122/62  Weight: 115 kg (254 lb)  Height: 160 cm (5' 3\")  BMI (Calculated): 44.99  Pain Score: No pain     Alert, oriented in no acute distress.  Eye contact is good, speech goal directed, affect calm.    Assessment and Plan. The following treatment and monitoring plan is recommended:  Disorder of arteries and arterioles, unspecified (HCC)  Diagnosed via findings with screening Quantaflo study that suggested mild atherosclerotic burden in lower extremities. Pt does have a hx of smoking, HLD, and HTN increasing risk factors for peripheral arterial dz as well. Pt denies claudication. Continue to monitor. Follow up with PCP.    Paroxysmal atrial fibrillation (HCC)  Chronic, stable. Pt maintains Eliquis 5mg BID for stroke prophylaxis. Follow up with " cardiology per routine for continued monitoring and management.    Secondary hypercoagulable state (HCC)  Chronic, ongoing, secondary to hx of PAF. Pt maintains on Eliquis 5mg BID for stroke prophylaxis. Follow up with cardiology at least annually for continued monitoring and management.    Pulmonary hypertension (HCC) RVSP 30  Chronic, stable. Noted on 2021 echocardiogram with RVSP mildly elevated at 30. Pt denies dyspnea. Follow up with PCP.    Morbid obesity with BMI of 40.0-44.9, adult (HCC)  Chronic, ongoing. BMI today 44.99. Associated with KOFI, hx of HTN. Continue to encourage healthy calorie conscious diet with regular physical activity with goal of weight loss. Follow up with PCP at least annually for continued monitoring and management.    Obstructive sleep apnea  Chronic, stable Pt maintains on CPAP nightly. Follow up with sleep medicine per routine.    Ectatic thoracic aorta (HCC)  Noted on 2021 echocardiogram with finding of ascending aorta measuring 3.5cm. Continue to monitor. Follow up with PCP.    Services suggested: No services needed at this time  Health Care Screening: Age-appropriate preventive services recommended by USPTF and ACIP covered by Medicare were discussed today. Services ordered if indicated and agreed upon by the patient.  Referrals offered: Community-based lifestyle interventions to reduce health risks and promote self-management and wellness, fall prevention, nutrition, physical activity, tobacco-use cessation, weight loss, and mental health services as per orders if indicated.    Discussion today about general wellness and lifestyle habits:    Prevent falls and reduce trip hazards; Cautioned about securing or removing rugs.  Have a working fire alarm and carbon monoxide detector.  Engage in regular physical activity and social activities.    Follow-up: Return for follow up visit with PCP as previously scheduled.

## 2024-08-27 ENCOUNTER — OFFICE VISIT (OUTPATIENT)
Dept: MEDICAL GROUP | Facility: PHYSICIAN GROUP | Age: 79
End: 2024-08-27
Payer: MEDICARE

## 2024-08-27 VITALS
TEMPERATURE: 96.4 F | OXYGEN SATURATION: 96 % | HEIGHT: 63 IN | SYSTOLIC BLOOD PRESSURE: 142 MMHG | BODY MASS INDEX: 44.88 KG/M2 | RESPIRATION RATE: 16 BRPM | HEART RATE: 59 BPM | DIASTOLIC BLOOD PRESSURE: 80 MMHG | WEIGHT: 253.3 LBS

## 2024-08-27 DIAGNOSIS — R73.01 ELEVATED FASTING BLOOD SUGAR: ICD-10-CM

## 2024-08-27 DIAGNOSIS — I27.20 PULMONARY HYPERTENSION (HCC): ICD-10-CM

## 2024-08-27 DIAGNOSIS — R60.9 EDEMA, UNSPECIFIED TYPE: ICD-10-CM

## 2024-08-27 DIAGNOSIS — E78.6 LOW HDL (UNDER 40): ICD-10-CM

## 2024-08-27 DIAGNOSIS — I10 ESSENTIAL HYPERTENSION: ICD-10-CM

## 2024-08-27 DIAGNOSIS — I48.0 PAROXYSMAL ATRIAL FIBRILLATION (HCC): ICD-10-CM

## 2024-08-27 PROCEDURE — 3077F SYST BP >= 140 MM HG: CPT | Performed by: INTERNAL MEDICINE

## 2024-08-27 PROCEDURE — 99214 OFFICE O/P EST MOD 30 MIN: CPT | Performed by: INTERNAL MEDICINE

## 2024-08-27 PROCEDURE — 3079F DIAST BP 80-89 MM HG: CPT | Performed by: INTERNAL MEDICINE

## 2024-08-27 ASSESSMENT — FIBROSIS 4 INDEX: FIB4 SCORE: 1.41

## 2024-08-27 NOTE — PROGRESS NOTES
Subjective:   Chief Complaint/History of Present Illness:  Ruby Madgline Emmerling is a 79 y.o. female established patient who presents today to discuss medical problems as listed below. Luda is unaccompanied for today's visit.    History of Present Illness  The patient presents for evaluation of multiple medical concerns.    She reports that her blood pressure readings have been lower in recent weeks, with a reading of 122/62 taken during her wellness assessment a few weeks ago. She has a history of fluid retention, particularly in her ankles, which worsens during the summer and after prolonged standing at work. Despite this, she tried to remain active, including daily walks. She has had varicose veins since high school and does not experience shortness of breath or chest congestion. Her last episode of atrial fibrillation occurred on 07/01/2024, but she has not experienced any episodes since 07/02/2024. She has a home blood pressure monitor and plans to record her readings on her phone. She does not feel dizzy or unwell. Previously on medication for blood pressure in her 60's.    She felt fatigued for 3 to 4 days after receiving her RSV and influenza vaccines. She sleeps well and feels refreshed upon waking. She has increased her daily step count from 1000 to over 3000 steps and aims to walk between 5000 and 6000 steps per day. She does not experience joint pain during her walks. She experiences back pain when transitioning from sitting to standing, but this improves with movement. She has regular bowel movements and was previously on medication for high blood pressure. She is considering reducing her salt intake as another approach. She continues to work.    She consumes one cup of coffee and one cup of tea daily, and occasionally drinks soda. She consumed coffee shortly before today's visit.    FAMILY HISTORY  Her daughters have high blood pressure.       Problem   Edema        Current Medications:  Current  "Outpatient Medications Ordered in Epic   Medication Sig Dispense Refill    apixaban (ELIQUIS) 5mg Tab TAKE 1 TABLET BY MOUTH TWICE A  Tablet 3    COLLAGEN PO Take  by mouth. 1 scoop powder daily      Misc Natural Products (FOCUSED MIND PO) Take 2 Capsules by mouth every day.      SUPER B COMPLEX/C PO Take 1 capsule by mouth every day.      MAGNESIUM PO Take 1 Tablet by mouth. Unable to recall dose      vitamin D (CHOLECALCIFEROL) 1000 UNIT Tab Take 1,000 Units by mouth every day.      POTASSIUM PO Take 1 Tablet by mouth every day. Unable to recall dose       No current Epic-ordered facility-administered medications on file.          Objective:   Physical Exam:    Vitals: BP (!) 142/80 (BP Location: Left arm, Patient Position: Sitting, BP Cuff Size: Adult)   Pulse (!) 59   Temp (!) 35.8 °C (96.4 °F) (Temporal)   Resp 16   Ht 1.6 m (5' 3\")   Wt 115 kg (253 lb 4.8 oz)   SpO2 96%    BMI: Body mass index is 44.87 kg/m².  Physical Exam  Constitutional:       General: She is not in acute distress.     Appearance: Normal appearance. She is not ill-appearing.   HENT:      Right Ear: External ear normal.      Left Ear: External ear normal.   Eyes:      General: No scleral icterus.     Conjunctiva/sclera: Conjunctivae normal.   Cardiovascular:      Rate and Rhythm: Normal rate and regular rhythm.      Pulses: Normal pulses.   Pulmonary:      Effort: Pulmonary effort is normal. No respiratory distress.      Breath sounds: No wheezing, rhonchi or rales.   Abdominal:      General: Bowel sounds are normal.      Palpations: Abdomen is soft.   Musculoskeletal:      Comments: Left > right nonpitting edema, pretibial   Skin:     General: Skin is warm and dry.      Findings: No rash.      Comments: +spider veins lower legs, left > right   Neurological:      Gait: Gait normal.   Psychiatric:         Mood and Affect: Mood normal.         Behavior: Behavior normal.         Thought Content: Thought content normal.         " Judgment: Judgment normal.         Assessment & Plan  Elevated blood pressure reading in office.  History of hypertension  Elevated blood pressure was noted during the visit, potentially due to recent caffeine intake or fluid retention. Her weight has increased by 7 to 8 pounds, which could be a result of fluid retention exacerbated by hypertension. The varicose veins may also contribute to swelling and fluid retention. A slight increase in weight is not unusual given the time of day and recent food and drink intake. The goal is to maintain blood pressure below 140/80. She was advised to monitor her blood pressure at home daily for two weeks, recording the readings at different times of the day. These readings can then be sent via [employer]. If the average reading exceeds 140/80, additional treatment may be necessary to prevent potential damage to the heart and lungs. She was also advised to reduce salt and caffeine intake.    3. Atrial Fibrillation, paroxysmal.  She reported the last episode of atrial fibrillation occurred on July 1 and normally lasts for hours and she feels symptoms. She was advised to continue monitoring for any new episodes and report them if they occur. No new episodes have been reported since July 2. Continue apixaban 5 mg twice daily for stroke prophylaxis.    4. Health Maintenance.  She received the RSV and influenza vaccines recently. She reported feeling tired for three to four days post-vaccination but has since recovered. No further RSV vaccination is needed as it is typically a one-time dose for adults.    5. Pulmonary hypertension  6. Lower extremity edema  May be related or contributing to elevated blood pressure reading today. Check home readings for the next 2 weeks. May need to add back pharmacotherapy. Can also consider updating echocardiogram if pulmonary hypertension felt to be contributing.    7. Low HDL  8. Elevated fasting blood sugar  Lipid panel and A1c stable on last  evaluation earlier this year, continue following twice annually to ensure stability. No current medications for these issues.          Assessment and Plan:   Luda is a 79 y.o. female with the following:  Problem List Items Addressed This Visit       Edema    Relevant Orders    TSH    Essential hypertension_off pharmacotherapy    Paroxysmal atrial fibrillation (HCC)    Pulmonary hypertension (HCC) RVSP 30    Relevant Orders    TSH    Comp Metabolic Panel    CBC WITH DIFFERENTIAL     Other Visit Diagnoses       Elevated fasting blood sugar        Relevant Orders    TSH    HEMOGLOBIN A1C    Low HDL (under 40)        Relevant Orders    FREE THYROXINE    TSH    VITAMIN B12    VITAMIN D,25 HYDROXY (DEFICIENCY)    Lipid Profile    Comp Metabolic Panel    CBC WITH DIFFERENTIAL               RTC: Return in about 3 months (around 11/27/2024).    I spent a total of 32 minutes with record review, exam, communication with the patient, communication with other providers, and documentation of this encounter.    Verbal consent was acquired by the patient to use [employer] ambient listening note generation during this visit Yes       PLEASE NOTE: This dictation was created using voice recognition software. I have made every reasonable attempt to correct obvious errors, but I expect that there are errors of grammar and possibly content that I did not discover before finalizing the note.      Kenna Alamo, DO  Geriatric and Internal Medicine  [facility]

## 2024-08-27 NOTE — LETTER
August 27, 2024        Ruby Madgline Emmerling      To whom it may concern,      Luda was seen in my office today for a regular follow up visit on August 27th, 2024. Please excuse her from work during our appointment and reach out if you have any follow up questions.          Sincerely,                Kenna Alamo D.O.

## 2024-09-03 ENCOUNTER — TELEPHONE (OUTPATIENT)
Dept: MEDICAL GROUP | Facility: PHYSICIAN GROUP | Age: 79
End: 2024-09-03
Payer: MEDICARE

## 2024-09-03 NOTE — TELEPHONE ENCOUNTER
1. Caller Name: Ruby Madgline Emmerling                          Call Back Number: [phone] (home)         How would the patient prefer to be contacted with a response: Phone call OK to leave a detailed message    Called to get BP readings   Left a message to have her call back with readings or My Chart us.

## 2024-11-27 ENCOUNTER — APPOINTMENT (OUTPATIENT)
Dept: MEDICAL GROUP | Facility: PHYSICIAN GROUP | Age: 79
End: 2024-11-27
Payer: MEDICARE

## 2025-03-19 DIAGNOSIS — D68.69 SECONDARY HYPERCOAGULABLE STATE (HCC): ICD-10-CM

## 2025-03-19 DIAGNOSIS — I48.0 PAROXYSMAL ATRIAL FIBRILLATION (HCC): ICD-10-CM

## 2025-03-19 RX ORDER — APIXABAN 5 MG/1
5 TABLET, FILM COATED ORAL 2 TIMES DAILY
Qty: 200 TABLET | Refills: 3 | Status: SHIPPED | OUTPATIENT
Start: 2025-03-19 | End: 2025-03-24

## 2025-03-20 ENCOUNTER — OFFICE VISIT (OUTPATIENT)
Dept: MEDICAL GROUP | Facility: PHYSICIAN GROUP | Age: 80
End: 2025-03-20
Payer: MEDICARE

## 2025-03-20 ENCOUNTER — PATIENT MESSAGE (OUTPATIENT)
Dept: HEALTH INFORMATION MANAGEMENT | Facility: OTHER | Age: 80
End: 2025-03-20

## 2025-03-20 VITALS
WEIGHT: 234.5 LBS | RESPIRATION RATE: 16 BRPM | BODY MASS INDEX: 41.55 KG/M2 | DIASTOLIC BLOOD PRESSURE: 70 MMHG | OXYGEN SATURATION: 99 % | HEIGHT: 63 IN | HEART RATE: 67 BPM | SYSTOLIC BLOOD PRESSURE: 136 MMHG | TEMPERATURE: 97.1 F

## 2025-03-20 DIAGNOSIS — G47.33 OBSTRUCTIVE SLEEP APNEA: ICD-10-CM

## 2025-03-20 DIAGNOSIS — I48.0 PAROXYSMAL ATRIAL FIBRILLATION (HCC): ICD-10-CM

## 2025-03-20 DIAGNOSIS — E66.01 MORBID OBESITY WITH BMI OF 40.0-44.9, ADULT (HCC): ICD-10-CM

## 2025-03-20 DIAGNOSIS — D68.69 SECONDARY HYPERCOAGULABLE STATE (HCC): ICD-10-CM

## 2025-03-20 DIAGNOSIS — I27.20 PULMONARY HYPERTENSION (HCC): ICD-10-CM

## 2025-03-20 DIAGNOSIS — Z12.31 ENCOUNTER FOR SCREENING MAMMOGRAM FOR BREAST CANCER: ICD-10-CM

## 2025-03-20 PROCEDURE — 99214 OFFICE O/P EST MOD 30 MIN: CPT | Performed by: INTERNAL MEDICINE

## 2025-03-20 PROCEDURE — 3078F DIAST BP <80 MM HG: CPT | Performed by: INTERNAL MEDICINE

## 2025-03-20 PROCEDURE — RXMED WILLOW AMBULATORY MEDICATION CHARGE: Performed by: INTERNAL MEDICINE

## 2025-03-20 PROCEDURE — 3075F SYST BP GE 130 - 139MM HG: CPT | Performed by: INTERNAL MEDICINE

## 2025-03-20 PROCEDURE — G2211 COMPLEX E/M VISIT ADD ON: HCPCS | Performed by: INTERNAL MEDICINE

## 2025-03-20 ASSESSMENT — FIBROSIS 4 INDEX: FIB4 SCORE: 1.41

## 2025-03-20 ASSESSMENT — PATIENT HEALTH QUESTIONNAIRE - PHQ9: CLINICAL INTERPRETATION OF PHQ2 SCORE: 0

## 2025-03-20 NOTE — PATIENT INSTRUCTIONS
Zepbound- tirzepatide. We will see if we can get this covered by insurance for the new FDA approval for obesity and obstructive sleep apnea.

## 2025-03-20 NOTE — LETTER
To whom it may concern,      Ruby Emmerling (- 1945) was seen in my clinic on 2025 for a routine follow up appointment. Please excuse her from work for the time of her appointment. Reach out if any clarification is needed.        Sincerely,      Dr. Kenna Alamo  [phone]

## 2025-03-20 NOTE — PROGRESS NOTES
Subjective:   Chief Complaint/History of Present Illness:  Ruby Madgline Emmerling is a 79 y.o. female established patient who presents today to discuss medical problems as listed below. Luda is accompanied by her daughter.    History of Present Illness  The patient presents for weight management, atrial fibrillation, and blood pressure management.    She has been experiencing difficulties with her blood pressure monitoring device, which she finds challenging to operate. The readings have been inconsistent, fluctuating between extremely high and low values. She has recently acquired a new device but has not yet had the opportunity to test it.    She has initiated a regimen of semaglutide through an online compounding pharmacy, motivated by a fear of falling due to her weight. She started this treatment in 11/2024, just prior to Thanksgiving. She has observed that overeating results in nausea and occasional vomiting. Her daughter assists with the administration of the injections every Thursday night. She has experienced significant nausea, particularly on Fridays, which has led her to take Zofran. Despite these side effects, she continues to work daily. She maintains regular bowel movements and supplements her diet with fiber, negating the need for laxatives. She has been gradually increasing her semaglutide dosage, with the most recent increase occurring in 01/2025. She has not increased the dosage in 02/2025 and 03/2025 due to concerns about the associated nausea. She reports that the medication sometimes suppresses her appetite, but not consistently. She has noticed an increased desire to eat meat, which was more pronounced before starting the medication. She is currently on a 0.4 mg dose of semaglutide.    She has not experienced any episodes of atrial fibrillation since 10/2024. She has been engaging in daily walks for the past year, gradually increasing her step count from 1000 to over 3000. She initially  "walked for 30 minutes, but now walks for 45 to 50 minutes. If she does not achieve her step goal, she continues to walk.    She is currently on Eliquis 5 mg twice daily and has a week's supply remaining.    MEDICATIONS  Current: Semaglutide, Zofran, Eliquis           Current Medications:  Current Outpatient Medications Ordered in Epic   Medication Sig Dispense Refill    apixaban (ELIQUIS) 5mg Tab Take 1 Tablet by mouth 2 times a day. Please dispense 30 day sample 60 Tablet 0    SEMAGLUTIDE, 1 MG/DOSE, SC Inject 0.4 mg under the skin every 7 days.      ELIQUIS 5 MG Tab TAKE 1 TABLET BY MOUTH TWICE A  Tablet 3    COLLAGEN PO Take  by mouth. 1 scoop powder daily      Misc Natural Products (FOCUSED MIND PO) Take 2 Capsules by mouth every day.      SUPER B COMPLEX/C PO Take 1 capsule by mouth every day.      MAGNESIUM PO Take 1 Tablet by mouth. Unable to recall dose      vitamin D (CHOLECALCIFEROL) 1000 UNIT Tab Take 1,000 Units by mouth every day.      POTASSIUM PO Take 1 Tablet by mouth every day. Unable to recall dose       No current Epic-ordered facility-administered medications on file.          Objective:   Physical Exam:    Vitals: /70 (BP Location: Right arm, Patient Position: Sitting, BP Cuff Size: Large adult)   Pulse 67   Temp 36.2 °C (97.1 °F) (Temporal)   Resp 16   Ht 1.6 m (5' 3\")   Wt 106 kg (234 lb 8 oz)   SpO2 99%    BMI: Body mass index is 41.54 kg/m².  Physical Exam  Constitutional:       General: She is not in acute distress.     Appearance: Normal appearance. She is not ill-appearing.   HENT:      Right Ear: External ear normal.      Left Ear: External ear normal.   Eyes:      General: No scleral icterus.     Conjunctiva/sclera: Conjunctivae normal.   Cardiovascular:      Rate and Rhythm: Normal rate and regular rhythm.      Pulses: Normal pulses.   Pulmonary:      Effort: Pulmonary effort is normal. No respiratory distress.      Breath sounds: No wheezing or rhonchi. "   Psychiatric:         Mood and Affect: Mood normal.         Behavior: Behavior normal.         Thought Content: Thought content normal.         Judgment: Judgment normal.          Assessment & Plan  1. Severe obesity  2. Obstructive sleep apnea   She has experienced a weight loss of approximately 20 pounds since August 2024. She is currently on semaglutide 0.4 mg, which she obtains through a compounding pharmacy. She reports nausea as a side effect, especially when overeating. Discussed about FDA approval for tirzepatide for diabetes and Zepbound for weight loss where if she has a BMI above 30 and has history of obstructive sleep apnea, it is FDA approved for treatment. A prescription for tirzepatide (Zepbound) will be submitted, starting at the lowest dose of 2.5 mg, with a prior authorization process initiated to determine if insurance coverage can be secured under the new FDA approval for obesity and obstructive sleep apnea. She is advised to continue her current regimen of semaglutide until the transition to tirzepatide is confirmed.    3. Paroxysmal atrial fibrillation.  4. Secondary hypercoagulable state  She has not had an episode since October 2024. She is advised to continue monitoring her condition using her watch and to report any symptoms such as a racing heart or other irregularities. If symptoms occur, a referral back to cardiology will be considered.    5. History of hypertension, off pharmacotherapy  Her blood pressure was normal on 08/02/2024 but borderline high on 08/27/2024. A recheck of her blood pressure will be conducted today. If elevated, a prescription for amlodipine 5 mg may be considered.    6. Pulmonary hypertension  Secondary to obesity and sleep apnea, continue weight loss efforts, will recheck in 3 months.      Assessment and Plan:   Luda is a 79 y.o. female with the following:  Problem List Items Addressed This Visit       Morbid obesity with BMI of 40.0-44.9, adult (HCC)    Relevant  Medications    SEMAGLUTIDE, 1 MG/DOSE, SC    Obstructive sleep apnea    Paroxysmal atrial fibrillation (HCC)    Relevant Medications    apixaban (ELIQUIS) 5mg Tab    Pulmonary hypertension (HCC) RVSP 30    Relevant Medications    apixaban (ELIQUIS) 5mg Tab    Secondary hypercoagulable state (HCC)    Relevant Medications    apixaban (ELIQUIS) 5mg Tab     Other Visit Diagnoses         Encounter for screening mammogram for breast cancer        Relevant Orders    MA-SCREENING MAMMO BILAT W/TOMOSYNTHESIS W/CAD               RTC: Return in about 3 months (around 6/20/2025).    I spent a total of 34 minutes with record review, exam, communication with the patient, communication with other providers, and documentation of this encounter.    Verbal consent was acquired by the patient to use [employer] ambient listening note generation during this visit Yes     Billing : secondary to the complexity of this patient's illnesses and their interactions.  All problems listed were discussed during the office visit, medications were evaluated and complexities were discussed as well as plan for the future.        PLEASE NOTE: This dictation was created using voice recognition software. I have made every reasonable attempt to correct obvious errors, but I expect that there are errors of grammar and possibly content that I did not discover before finalizing the note.      Kenna Alamo, DO  Geriatric and Internal Medicine  [facility]

## 2025-03-21 ENCOUNTER — PHARMACY VISIT (OUTPATIENT)
Dept: PHARMACY | Facility: MEDICAL CENTER | Age: 80
End: 2025-03-21
Payer: COMMERCIAL

## 2025-03-24 DIAGNOSIS — D68.69 SECONDARY HYPERCOAGULABLE STATE (HCC): ICD-10-CM

## 2025-03-24 DIAGNOSIS — I48.0 PAROXYSMAL ATRIAL FIBRILLATION (HCC): ICD-10-CM

## 2025-03-24 DIAGNOSIS — G47.33 OBSTRUCTIVE SLEEP APNEA: ICD-10-CM

## 2025-03-24 DIAGNOSIS — E66.01 MORBID OBESITY WITH BMI OF 40.0-44.9, ADULT (HCC): ICD-10-CM

## 2025-03-24 RX ORDER — TIRZEPATIDE 5 MG/.5ML
5 INJECTION, SOLUTION SUBCUTANEOUS
Qty: 2 ML | Refills: 11 | Status: SHIPPED | OUTPATIENT
Start: 2025-03-24

## 2025-04-03 ENCOUNTER — TELEPHONE (OUTPATIENT)
Dept: SLEEP MEDICINE | Facility: MEDICAL CENTER | Age: 80
End: 2025-04-03
Payer: MEDICARE

## 2025-04-04 ENCOUNTER — OFFICE VISIT (OUTPATIENT)
Dept: SLEEP MEDICINE | Facility: MEDICAL CENTER | Age: 80
End: 2025-04-04
Attending: PHYSICIAN ASSISTANT
Payer: MEDICARE

## 2025-04-04 ENCOUNTER — TELEPHONE (OUTPATIENT)
Dept: MEDICAL GROUP | Facility: PHYSICIAN GROUP | Age: 80
End: 2025-04-04
Payer: MEDICARE

## 2025-04-04 VITALS
WEIGHT: 231 LBS | RESPIRATION RATE: 16 BRPM | BODY MASS INDEX: 40.93 KG/M2 | HEART RATE: 66 BPM | OXYGEN SATURATION: 96 % | DIASTOLIC BLOOD PRESSURE: 74 MMHG | SYSTOLIC BLOOD PRESSURE: 134 MMHG | HEIGHT: 63 IN

## 2025-04-04 DIAGNOSIS — G47.33 OSA (OBSTRUCTIVE SLEEP APNEA): ICD-10-CM

## 2025-04-04 PROCEDURE — 3078F DIAST BP <80 MM HG: CPT | Performed by: PHYSICIAN ASSISTANT

## 2025-04-04 PROCEDURE — 99213 OFFICE O/P EST LOW 20 MIN: CPT | Performed by: PHYSICIAN ASSISTANT

## 2025-04-04 PROCEDURE — 3075F SYST BP GE 130 - 139MM HG: CPT | Performed by: PHYSICIAN ASSISTANT

## 2025-04-04 ASSESSMENT — ENCOUNTER SYMPTOMS
ORTHOPNEA: 0
WHEEZING: 0
SORE THROAT: 0
CHILLS: 0
TREMORS: 0
WEIGHT LOSS: 0
PALPITATIONS: 1
INSOMNIA: 1
SPUTUM PRODUCTION: 0
HEARTBURN: 0
SINUS PAIN: 0
DIZZINESS: 0
SHORTNESS OF BREATH: 0
FEVER: 0
COUGH: 0
HEADACHES: 0

## 2025-04-04 ASSESSMENT — FIBROSIS 4 INDEX: FIB4 SCORE: 1.41

## 2025-04-04 NOTE — PROGRESS NOTES
"Chief Complaint   Patient presents with    Apnea     Last Office Visit 12/14/2023 with Ignacia Ramirez P.A.-C.    PAP/O2/OAT: auto CPAP @8-16 cm H20       HPI:  Ruby Madgline Emmerling is a 79 y.o. year old female here today for follow-up on obstructive sleep apnea.  Last seen in clinic 12/14/2023 by RASHID Reid.  Previously evaluated by Dr. Waters 8/12/2021.     Past Medical History: KOFI, morbid obesity, paroxysmal atrial fibrillation, secondary hypercoagulable state, essential hypertension, pulmonary hypertension, chronic low back pain, ectatic thoracic aorta. She is a former smoker with reported 32-pack-year history and quit date in 1998.     Vitals:  /74 (BP Location: Left arm, Patient Position: Sitting, BP Cuff Size: Large adult)   Pulse 66   Resp 16   Ht 1.6 m (5' 3\")   Wt 105 kg (231 lb)   SpO2 96% BMI 40.92 kg/m².    Recent Imaging: None    Echocardiogram obtained 9/16/2021 demonstrated normal left ventricular chamber size, wall thickness, systolic and diastolic function. LVEF estimated 70%. Normal right ventricular size and systolic function. Trace mitral regurgitation, trace tricuspid regurgitation, estimated RVSP of 30 mmHg.     Currently using ResMed auto CPAP@8-16 cm H20 pressure; compliance reviewed for 3/5/2025 through 4/3/2025, days used 30/30, average daily usage 7 hours 56 minutes, 100% of days greater than or equal to 4 hours, mask leak at 44 LPM at 95th percentile, AHI 0.7 per hour.  Severe mask leak, see media for full report.    Device obtained May 2023  DME provider [facility]   Mask interface hybrid fullface mask    Overnight home sleep study obtained 2/6/2023 demonstrating findings consistent with mild obstructive sleep apnea with pAHI of 9 events per hour and P RDI of 27.3 events per hour.  Low O2 sat of 83% with sats less than or equal to 88% for 3.1 minutes of evaluated time.  Recommendation was made for titration study versus auto CPAP trial.     Sleep schedule goes to bed 7 " PM, wakens 3 AM , and gets up during the night bathroom x 1   Symptoms denies day time somnolence and denies morning headache    Stop bang score of 5 on 9/13/2023       Review of Systems   Constitutional:  Negative for chills, fever, malaise/fatigue and weight loss.   HENT:  Negative for congestion, hearing loss, nosebleeds, sinus pain, sore throat and tinnitus.    Eyes:         Presc glasses    Respiratory:  Negative for cough, sputum production, shortness of breath and wheezing.    Cardiovascular:  Positive for palpitations (last october). Negative for chest pain, orthopnea and leg swelling.   Gastrointestinal:  Negative for heartburn.        Partial upper teeth, missing two lower molars, no swallowing issues    Neurological:  Negative for dizziness, tremors and headaches.   Psychiatric/Behavioral:  The patient has insomnia (occasional, wakens during night and can't go back to sleep).        Past Medical History:   Diagnosis Date    Breast lump     lumpectomy    Chickenpox     Dental disorder     upper partials    [place] measles     Heart burn     with spicy foods    Low HDL (under 40) 10/15/2018      Ref. Range 6/25/2021 08:25  Cholesterol,Tot Latest Ref Range: 100 - 199 mg/dL 154  Triglycerides Latest Ref Range: 0 - 149 mg/dL 98  HDL Latest Ref Range: >=40 mg/dL 38 (A)  LDL Latest Ref Range: <100 mg/dL 96   She has history of mild LDL elevation.  She had an abnormal EKG since 2013 with Q waves in anterior leads suggestive of prior anterior infarct.  Nuclear stress test in 2013 did not show     Sleep apnea     uses CPAP    Urinary incontinence        Past Surgical History:   Procedure Laterality Date    NC COLONOSCOPY-FLEXIBLE  9/13/2023    Procedure: AVERAGE RISK SCREENING COLONOSCOPY;  Surgeon: Gennaro Fleming M.D.;  Location: SURGERY [facility];  Service: Gastroenterology    ANKLE FUSION      CHOLECYSTECTOMY         Family History   Problem Relation Age of Onset    Cancer Mother          Uterine/Lymphnode/Metastisized    Heart Disease Sister     Cancer Sister         Uterian    Breast Cancer Maternal Aunt     Cancer Sister         Skin    Other Sister         something eating her from inside out/rotting skin    Suicide Attempts Father     No Known Problems Brother     No Known Problems Brother     Heart Disease Daughter         HO Congenital heart disease    Other Daughter         epilepsy       Social History     Socioeconomic History    Marital status: Single     Spouse name: Not on file    Number of children: Not on file    Years of education: Not on file    Highest education level: Not on file   Occupational History    Not on file   Tobacco Use    Smoking status: Former     Current packs/day: 0.00     Average packs/day: 2.0 packs/day for 32.0 years (64.0 ttl pk-yrs)     Types: Cigarettes     Start date: 1966     Quit date: 1998     Years since quittin.7    Smokeless tobacco: Never   Vaping Use    Vaping status: Never Used   Substance and Sexual Activity    Alcohol use: Not Currently    Drug use: No    Sexual activity: Never     Partners: Male   Other Topics Concern    Not on file   Social History Narrative    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[employer]     Social Drivers of Health     Financial Resource Strain: Low Risk  (2024)    Overall Financial Resource Strain (CARDIA)     Difficulty of Paying Living Expenses: Not hard at all   Food Insecurity: No Food Insecurity (2024)    Hunger Vital Sign     Worried About Running Out of Food in the Last Year: Never true     Ran Out of Food in the Last Year: Never true   Transportation Needs: No Transportation Needs (2024)    PRAPARE - Transportation     Lack of Transportation (Medical): No     Lack of Transportation (Non-Medical): No   Physical Activity: Not on file   Stress: Not on file   Social Connections: Not on file   Intimate Partner Violence: Not on file   Housing Stability: Not on file       Allergies as of 2025 - Reviewed  04/04/2025   Allergen Reaction Noted    Demerol Vomiting 02/07/2013          Current medications as of today   Current Outpatient Medications   Medication Sig Dispense Refill    apixaban (ELIQUIS) 5mg Tab Take 1 Tablet by mouth 2 times a day. 200 Tablet 3    ZEPBOUND 5 MG/0.5ML Solution Auto-injector Inject 5 mg under the skin every 7 days. 2 mL 11    SEMAGLUTIDE, 1 MG/DOSE, SC Inject 0.4 mg under the skin every 7 days.      COLLAGEN PO Take  by mouth. 1 scoop powder daily      Misc Natural Products (FOCUSED MIND PO) Take 2 Capsules by mouth every day.      SUPER B COMPLEX/C PO Take 1 capsule by mouth every day.      MAGNESIUM PO Take 1 Tablet by mouth. Unable to recall dose      vitamin D (CHOLECALCIFEROL) 1000 UNIT Tab Take 1,000 Units by mouth every day.      POTASSIUM PO Take 1 Tablet by mouth every day. Unable to recall dose       No current facility-administered medications for this visit.         Physical Exam:   Gen:           Alert and oriented, No apparent distress. Mood and affect appropriate, normal interaction with examiner.   Hearing:     Grossly intact.  Nose:          Normal, no lesions or deformities.  Dentition:    poor dentition.   Oropharynx:   Tongue normal, posterior pharynx without erythema or exudate.  Mallampati Classification: III  Neck:        Supple, trachea midline, no masses.  Respiratory Effort: No intercostal retractions or use of accessory muscles.   Gait and Station: Grossly normal.  Digits and Nails: No clubbing, cyanosis, petechiae, or nodes.   Skin:        No rashes, lesions or ulcers noted.               Ext:           No cyanosis or edema.      Immunizations:  Flu: 8/3/2024  Pneumovax 23: 1/29/2019  Prevnar 13: 12/24/2017  Moderna SARS CoV2 Vaccine: 12/20/2021, 4/10/2021, 3/12/2021.     Assessment / Plan:  1. KOFI (obstructive sleep apnea)  - DME Mask and Supplies    Reviewed compliance demonstrating use and benefit.  Severe mask leak was noted however.  Patient is advised to  consider an alternative and hybrid fullface mask such as of 30I.  Will send updated order for mask and supplies to [facility].  Reminded to use distilled water only in humidifier chamber as well as reviewing equipment cleaning and replacement schedule.  Follow-up in 3 months.    2. Obesity BMI 48.92 kg/m².    Elevated BMI: Discussion regarding impact central adiposity on pulmonary function.  Encouraged to increase activity as tolerated and monitor nutritional intake.  Patient reports working on receiving Zepbound, may need to consider Wegovy given her cardiac history.    Follow-up:   Return in about 3 months (around 7/4/2025) for Return with Ignacia Ramirez PA-C.    Please note that this dictation was created using voice recognition software. I have made every reasonable attempt to correct obvious errors, but it is possible there are errors of grammar and possibly content that I did not discover before finalizing the note.

## 2025-04-04 NOTE — PATIENT INSTRUCTIONS
1-working on zepbound, may need to consider wegovy for cardiac history  2-reviewed compliance, demonstrating use and benefit  3-however, severe mask leak noted  4-consider alternate hybrid full face mask such as F30i  5-send updated order for mask and supplies to [facility]  6-As a reminder use distilled water only in humidifier chamber.  Fresh fill daily  7-Today we reviewed equipment cleaning  once weekly minimum  mask, tubing and water chamber  use dedicated container  use mild soap and water  SoClean or other ozone  are not recommended  white vinegar and water solution is no longer recommended  hang tubing to dry  mask sanitizing wipes are an option for use   8-Equipment replacement schedule : Mask cushion every month, Head gear every 6 months, Tubing every 3 months, Ultra-fine filters 2 times per month, Humidifier chamber every 6 months  9-follow up in 3 months due to severe mask leak to reassess

## 2025-04-16 ENCOUNTER — PHARMACY VISIT (OUTPATIENT)
Dept: PHARMACY | Facility: MEDICAL CENTER | Age: 80
End: 2025-04-16
Payer: COMMERCIAL

## 2025-04-16 PROCEDURE — RXMED WILLOW AMBULATORY MEDICATION CHARGE: Performed by: INTERNAL MEDICINE

## 2025-04-25 ENCOUNTER — HOSPITAL ENCOUNTER (OUTPATIENT)
Dept: LAB | Facility: MEDICAL CENTER | Age: 80
End: 2025-04-25
Attending: INTERNAL MEDICINE
Payer: MEDICARE

## 2025-04-25 DIAGNOSIS — R73.01 ELEVATED FASTING BLOOD SUGAR: ICD-10-CM

## 2025-04-25 DIAGNOSIS — R60.9 EDEMA, UNSPECIFIED TYPE: ICD-10-CM

## 2025-04-25 DIAGNOSIS — I27.20 PULMONARY HYPERTENSION (HCC): ICD-10-CM

## 2025-04-25 DIAGNOSIS — E78.6 LOW HDL (UNDER 40): ICD-10-CM

## 2025-04-25 LAB
25(OH)D3 SERPL-MCNC: 37 NG/ML (ref 30–100)
ALBUMIN SERPL BCP-MCNC: 4.1 G/DL (ref 3.2–4.9)
ALBUMIN/GLOB SERPL: 1.6 G/DL
ALP SERPL-CCNC: 87 U/L (ref 30–99)
ALT SERPL-CCNC: 45 U/L (ref 2–50)
ANION GAP SERPL CALC-SCNC: 10 MMOL/L (ref 7–16)
AST SERPL-CCNC: 34 U/L (ref 12–45)
BASOPHILS # BLD AUTO: 0.4 % (ref 0–1.8)
BASOPHILS # BLD: 0.03 K/UL (ref 0–0.12)
BILIRUB SERPL-MCNC: 1.2 MG/DL (ref 0.1–1.5)
BUN SERPL-MCNC: 16 MG/DL (ref 8–22)
CALCIUM ALBUM COR SERPL-MCNC: 8.9 MG/DL (ref 8.5–10.5)
CALCIUM SERPL-MCNC: 9 MG/DL (ref 8.5–10.5)
CHLORIDE SERPL-SCNC: 105 MMOL/L (ref 96–112)
CHOLEST SERPL-MCNC: 142 MG/DL (ref 100–199)
CO2 SERPL-SCNC: 25 MMOL/L (ref 20–33)
CREAT SERPL-MCNC: 0.92 MG/DL (ref 0.5–1.4)
EOSINOPHIL # BLD AUTO: 0.11 K/UL (ref 0–0.51)
EOSINOPHIL NFR BLD: 1.5 % (ref 0–6.9)
ERYTHROCYTE [DISTWIDTH] IN BLOOD BY AUTOMATED COUNT: 46.4 FL (ref 35.9–50)
EST. AVERAGE GLUCOSE BLD GHB EST-MCNC: 103 MG/DL
FASTING STATUS PATIENT QL REPORTED: NORMAL
GFR SERPLBLD CREATININE-BSD FMLA CKD-EPI: 64 ML/MIN/1.73 M 2
GLOBULIN SER CALC-MCNC: 2.5 G/DL (ref 1.9–3.5)
GLUCOSE SERPL-MCNC: 78 MG/DL (ref 65–99)
HBA1C MFR BLD: 5.2 % (ref 4–5.6)
HCT VFR BLD AUTO: 38.9 % (ref 37–47)
HDLC SERPL-MCNC: 44 MG/DL
HGB BLD-MCNC: 13.2 G/DL (ref 12–16)
IMM GRANULOCYTES # BLD AUTO: 0.02 K/UL (ref 0–0.11)
IMM GRANULOCYTES NFR BLD AUTO: 0.3 % (ref 0–0.9)
LDLC SERPL CALC-MCNC: 79 MG/DL
LYMPHOCYTES # BLD AUTO: 1.78 K/UL (ref 1–4.8)
LYMPHOCYTES NFR BLD: 24.5 % (ref 22–41)
MCH RBC QN AUTO: 30.1 PG (ref 27–33)
MCHC RBC AUTO-ENTMCNC: 33.9 G/DL (ref 32.2–35.5)
MCV RBC AUTO: 88.8 FL (ref 81.4–97.8)
MONOCYTES # BLD AUTO: 0.51 K/UL (ref 0–0.85)
MONOCYTES NFR BLD AUTO: 7 % (ref 0–13.4)
NEUTROPHILS # BLD AUTO: 4.83 K/UL (ref 1.82–7.42)
NEUTROPHILS NFR BLD: 66.3 % (ref 44–72)
NRBC # BLD AUTO: 0 K/UL
NRBC BLD-RTO: 0 /100 WBC (ref 0–0.2)
PLATELET # BLD AUTO: 278 K/UL (ref 164–446)
PMV BLD AUTO: 9.7 FL (ref 9–12.9)
POTASSIUM SERPL-SCNC: 4.2 MMOL/L (ref 3.6–5.5)
PROT SERPL-MCNC: 6.6 G/DL (ref 6–8.2)
RBC # BLD AUTO: 4.38 M/UL (ref 4.2–5.4)
SODIUM SERPL-SCNC: 140 MMOL/L (ref 135–145)
T4 FREE SERPL-MCNC: 1.16 NG/DL (ref 0.93–1.7)
TRIGL SERPL-MCNC: 94 MG/DL (ref 0–149)
TSH SERPL-ACNC: 2.73 UIU/ML (ref 0.38–5.33)
VIT B12 SERPL-MCNC: 1123 PG/ML (ref 211–911)
WBC # BLD AUTO: 7.3 K/UL (ref 4.8–10.8)

## 2025-04-25 PROCEDURE — 36415 COLL VENOUS BLD VENIPUNCTURE: CPT

## 2025-04-25 PROCEDURE — 85025 COMPLETE CBC W/AUTO DIFF WBC: CPT

## 2025-04-25 PROCEDURE — 84443 ASSAY THYROID STIM HORMONE: CPT

## 2025-04-25 PROCEDURE — 84439 ASSAY OF FREE THYROXINE: CPT

## 2025-04-25 PROCEDURE — 82607 VITAMIN B-12: CPT

## 2025-04-25 PROCEDURE — 80053 COMPREHEN METABOLIC PANEL: CPT

## 2025-04-25 PROCEDURE — 82306 VITAMIN D 25 HYDROXY: CPT

## 2025-04-25 PROCEDURE — 83036 HEMOGLOBIN GLYCOSYLATED A1C: CPT

## 2025-04-25 PROCEDURE — 80061 LIPID PANEL: CPT

## 2025-05-02 ENCOUNTER — APPOINTMENT (OUTPATIENT)
Dept: RADIOLOGY | Facility: MEDICAL CENTER | Age: 80
End: 2025-05-02
Attending: INTERNAL MEDICINE
Payer: MEDICARE

## 2025-05-06 ENCOUNTER — RESULTS FOLLOW-UP (OUTPATIENT)
Dept: MEDICAL GROUP | Facility: PHYSICIAN GROUP | Age: 80
End: 2025-05-06

## 2025-06-06 ENCOUNTER — HOSPITAL ENCOUNTER (OUTPATIENT)
Facility: MEDICAL CENTER | Age: 80
End: 2025-06-06
Attending: INTERNAL MEDICINE
Payer: MEDICARE

## 2025-06-06 DIAGNOSIS — Z12.31 ENCOUNTER FOR SCREENING MAMMOGRAM FOR BREAST CANCER: ICD-10-CM

## 2025-06-06 PROCEDURE — 77063 BREAST TOMOSYNTHESIS BI: CPT

## 2025-06-09 ENCOUNTER — RESULTS FOLLOW-UP (OUTPATIENT)
Dept: MEDICAL GROUP | Facility: LAB | Age: 80
End: 2025-06-09

## 2025-06-10 ASSESSMENT — PATIENT HEALTH QUESTIONNAIRE - PHQ9
2. FEELING DOWN, DEPRESSED, IRRITABLE, OR HOPELESS: NOT AT ALL
1. LITTLE INTEREST OR PLEASURE IN DOING THINGS: NOT AT ALL

## 2025-06-10 ASSESSMENT — ENCOUNTER SYMPTOMS: GENERAL WELL-BEING: GOOD

## 2025-06-10 ASSESSMENT — ACTIVITIES OF DAILY LIVING (ADL): BATHING_REQUIRES_ASSISTANCE: 0

## 2025-06-13 ENCOUNTER — APPOINTMENT (OUTPATIENT)
Dept: FAMILY PLANNING/WOMEN'S HEALTH CLINIC | Facility: PHYSICIAN GROUP | Age: 80
End: 2025-06-13
Payer: MEDICARE

## 2025-06-13 DIAGNOSIS — I27.20 PULMONARY HYPERTENSION (HCC): ICD-10-CM

## 2025-06-13 DIAGNOSIS — I48.0 PAROXYSMAL ATRIAL FIBRILLATION (HCC): ICD-10-CM

## 2025-06-13 DIAGNOSIS — I10 ESSENTIAL HYPERTENSION: Primary | ICD-10-CM

## 2025-06-13 DIAGNOSIS — E66.01 MORBID OBESITY WITH BMI OF 40.0-44.9, ADULT (HCC): ICD-10-CM

## 2025-06-18 ENCOUNTER — APPOINTMENT (OUTPATIENT)
Dept: MEDICAL GROUP | Facility: PHYSICIAN GROUP | Age: 80
End: 2025-06-18
Payer: MEDICARE

## 2025-06-20 ASSESSMENT — PATIENT HEALTH QUESTIONNAIRE - PHQ9
1. LITTLE INTEREST OR PLEASURE IN DOING THINGS: NOT AT ALL
2. FEELING DOWN, DEPRESSED, IRRITABLE, OR HOPELESS: NOT AT ALL

## 2025-06-20 ASSESSMENT — ENCOUNTER SYMPTOMS: GENERAL WELL-BEING: GOOD

## 2025-06-20 ASSESSMENT — ACTIVITIES OF DAILY LIVING (ADL): BATHING_REQUIRES_ASSISTANCE: 0

## 2025-06-24 ENCOUNTER — OFFICE VISIT (OUTPATIENT)
Dept: MEDICAL GROUP | Facility: PHYSICIAN GROUP | Age: 80
End: 2025-06-24
Payer: MEDICARE

## 2025-06-24 VITALS
HEART RATE: 64 BPM | WEIGHT: 226.4 LBS | HEIGHT: 63 IN | DIASTOLIC BLOOD PRESSURE: 70 MMHG | SYSTOLIC BLOOD PRESSURE: 124 MMHG | BODY MASS INDEX: 40.11 KG/M2 | TEMPERATURE: 96.8 F | OXYGEN SATURATION: 98 %

## 2025-06-24 DIAGNOSIS — E66.01 MORBID OBESITY WITH BMI OF 40.0-44.9, ADULT (HCC): ICD-10-CM

## 2025-06-24 DIAGNOSIS — I48.0 PAROXYSMAL ATRIAL FIBRILLATION (HCC): ICD-10-CM

## 2025-06-24 DIAGNOSIS — D68.69 SECONDARY HYPERCOAGULABLE STATE (HCC): ICD-10-CM

## 2025-06-24 DIAGNOSIS — G47.33 OBSTRUCTIVE SLEEP APNEA: Primary | ICD-10-CM

## 2025-06-24 PROCEDURE — 3074F SYST BP LT 130 MM HG: CPT | Performed by: INTERNAL MEDICINE

## 2025-06-24 PROCEDURE — 99214 OFFICE O/P EST MOD 30 MIN: CPT | Performed by: INTERNAL MEDICINE

## 2025-06-24 PROCEDURE — RXMED WILLOW AMBULATORY MEDICATION CHARGE: Performed by: INTERNAL MEDICINE

## 2025-06-24 PROCEDURE — 3078F DIAST BP <80 MM HG: CPT | Performed by: INTERNAL MEDICINE

## 2025-06-24 RX ORDER — SEMAGLUTIDE 0.25 MG/.5ML
0.5 INJECTION, SOLUTION SUBCUTANEOUS
Qty: 4 ML | Refills: 0 | Status: SHIPPED | OUTPATIENT
Start: 2025-06-24

## 2025-06-24 ASSESSMENT — PATIENT HEALTH QUESTIONNAIRE - PHQ9: CLINICAL INTERPRETATION OF PHQ2 SCORE: 0

## 2025-06-24 ASSESSMENT — FIBROSIS 4 INDEX: FIB4 SCORE: 1.4

## 2025-06-24 NOTE — PROGRESS NOTES
"Subjective:   Chief Complaint/History of Present Illness:  Ruby Madgline Emmerling is a 79 y.o. female established patient who presents today to discuss medical problems as listed below. Luda is joined by her daughter.    History of Present Illness  The patient presents for weight management.    She reports a general sense of well-being and has been managing her weight effectively. She has not experienced any episodes of atrial fibrillation since 10/2024, which she attributes to her weight loss and increased physical activity. Her current regimen includes walking, with over 4000 steps recorded this morning. Initially, she was unable to walk for more than 15 minutes when she started 2 years ago, but now she can walk for almost an hour. She is currently on a medication regimen of Wegovy 4.2 mg weekly that costs her $170 per month, which she procures from an online pharmacy. Additionally, she is taking Eliquis for stroke prophylaxis for atrial fibrillation. She has sleep apnea and is compliant with her mask, initial AHI was 30.7 with oxygen low of 78%. Recent mammogram results with reassuring findings.       Current Medications:  Current Medications and Prescriptions Ordered in Epic[1]       Objective:   Physical Exam:    Vitals: /70   Pulse 64   Temp 36 °C (96.8 °F) (Temporal)   Ht 1.6 m (5' 3\")   Wt 103 kg (226 lb 6.4 oz)   SpO2 98%    BMI: Body mass index is 40.1 kg/m².  Physical Exam  Constitutional:       General: She is not in acute distress.     Appearance: Normal appearance. She is not ill-appearing.   HENT:      Right Ear: External ear normal.      Left Ear: External ear normal.   Eyes:      General: No scleral icterus.     Conjunctiva/sclera: Conjunctivae normal.   Cardiovascular:      Rate and Rhythm: Normal rate and regular rhythm.      Pulses: Normal pulses.   Pulmonary:      Effort: Pulmonary effort is normal. No respiratory distress.      Breath sounds: No wheezing or rhonchi.   Abdominal:     "  General: Bowel sounds are normal.      Palpations: Abdomen is soft.   Skin:     General: Skin is warm and dry.      Findings: No rash.   Psychiatric:         Mood and Affect: Mood normal.         Behavior: Behavior normal.         Thought Content: Thought content normal.         Judgment: Judgment normal.           Results  Sleep study-  PSG study from 9/17/21 indicated mild OAS with AHI of 8.1/hr and O2 loretta 78 %. AHI during rem was 30.7 and AHI while supine was 8.09. There was a mean oxygen saturation of 92.0% with a minimum oxygen saturation of 78.0%. Time spent during sleep with oxygen saturations below 89% was 22.0 minutes. The patient spent 0.5 minutes of TST with SaO2 <88%.     Currently using  auto CPAP @5-15 cm H20 pressure; compliance reviewed for 5/15/2023-6/13/2023, days used 30/30, average daily usage 7 hours 59 minutes, 100% of days greater than or equal to 4 hours, mask leak at 21.1 LPM at 95th percentile, AHI 0.9 per hour.  Pressures are consistently running 13-15       Assessment & Plan  1. Morbid obesity, BMI 40  2. KOFI on CPAP  - Currently on a regimen of semaglutide 2 mg.  - Discussed potential benefits of tirzepatide, including its efficacy in weight loss. Insurance would cover the cost up to a cap of $2000 annually, after which out-of-pocket expenses would be zero, but co-payments remain high.  - Explored the possibility of obtaining samples of Mounjaro or Zepbound, but uncertain if feasible due to FDA approval for diabetes. Alternatively, samples of Wegovy could be provided.  - Prescription for Wegovy 0.5 mg sent to pharmacy. Advised to contact the pharmacy to confirm availability. A work note was also provided.    3. Paroxysmal atrial fibrillation  4. Secondary hypercoagulable state  - No recent atrial fibrillation episodes, has improved with her ongoing weight loss.  - Continue Eliquis 5 mg twice daily for stroke prophylaxis, no bruising or bleeding concerns.      Assessment and Plan:    Luda is a 79 y.o. female with the following:  Problem List Items Addressed This Visit       Morbid obesity with BMI of 40.0-44.9, adult (HCC)    Relevant Medications    Semaglutide (WEGOVY) 0.25 MG/0.5ML Solution Auto-injector Pen-injector    Obstructive sleep apnea - Primary    Relevant Medications    Semaglutide (WEGOVY) 0.25 MG/0.5ML Solution Auto-injector Pen-injector    Paroxysmal atrial fibrillation (HCC)    Secondary hypercoagulable state (HCC)          RTC: Return in about 3 months (around 9/24/2025).    I spent a total of 30 minutes with record review, exam, communication with the patient, communication with other providers, and documentation of this encounter.    Verbal consent was acquired by the patient to use [employer] ambient listening note generation during this visit Yes       PLEASE NOTE: This dictation was created using voice recognition software. I have made every reasonable attempt to correct obvious errors, but I expect that there are errors of grammar and possibly content that I did not discover before finalizing the note.      Kenna Alamo, DO  Geriatric and Internal Medicine  [facility]          [1]   Current Outpatient Medications Ordered in Epic   Medication Sig Dispense Refill    Semaglutide (WEGOVY) 0.25 MG/0.5ML Solution Auto-injector Pen-injector Inject 1 mL under the skin every 7 days. 4 week sample to equal 0.5 mg weekly 4 mL 0    apixaban (ELIQUIS) 5mg Tab Take 1 Tablet by mouth 2 times a day. 200 Tablet 3    ZEPBOUND 5 MG/0.5ML Solution Auto-injector Inject 5 mg under the skin every 7 days. 2 mL 11    COLLAGEN PO Take  by mouth. 1 scoop powder daily      Misc Natural Products (FOCUSED MIND PO) Take 2 Capsules by mouth every day.      SUPER B COMPLEX/C PO Take 1 capsule by mouth every day.      MAGNESIUM PO Take 1 Tablet by mouth. Unable to recall dose      vitamin D (CHOLECALCIFEROL) 1000 UNIT Tab Take 1,000 Units by mouth every day.      POTASSIUM PO Take 1  Tablet by mouth every day. Unable to recall dose       No current Epic-ordered facility-administered medications on file.

## 2025-06-24 NOTE — LETTER
[facility]     June 24, 2025    Patient: Ruby Madgline Emmerling   YOB: 1945   Date of Visit: 6/24/2025       To Whom It May Concern:    Ruby Emmerling was seen and treated in our department on 6/24/2025.     Sincerely,     Kenna Alamo D.O.

## 2025-06-25 NOTE — ASSESSMENT & PLAN NOTE
Chronic, stable. Noted on 2021 echocardiogram with RVSP mildly elevated at 30. No further imaging for review. Pt denies dyspnea except with pushing herself with exercise. Follow up with PCP.

## 2025-06-25 NOTE — ASSESSMENT & PLAN NOTE
Chronic, stable. Pt maintains Eliquis 5mg BID for stroke prophylaxis. Denies palpitations since October. Follow up with cardiology per routine for continued monitoring and management.

## 2025-06-25 NOTE — ASSESSMENT & PLAN NOTE
Chronic, improving. BMI today 40.39. She is down 25lb since November after starting semaglutide. Associated with KOFI, hx of HTN. Continue to encourage healthy calorie conscious diet with regular physical activity with goal of weight loss. Follow up with PCP at least annually for continued monitoring and management.

## 2025-06-27 ENCOUNTER — PHARMACY VISIT (OUTPATIENT)
Dept: PHARMACY | Facility: MEDICAL CENTER | Age: 80
End: 2025-06-27
Payer: COMMERCIAL

## 2025-06-27 ENCOUNTER — OFFICE VISIT (OUTPATIENT)
Dept: FAMILY PLANNING/WOMEN'S HEALTH CLINIC | Facility: PHYSICIAN GROUP | Age: 80
End: 2025-06-27
Attending: FAMILY MEDICINE
Payer: MEDICARE

## 2025-06-27 VITALS
HEIGHT: 63 IN | HEART RATE: 70 BPM | SYSTOLIC BLOOD PRESSURE: 122 MMHG | DIASTOLIC BLOOD PRESSURE: 74 MMHG | WEIGHT: 228 LBS | BODY MASS INDEX: 40.4 KG/M2 | OXYGEN SATURATION: 96 %

## 2025-06-27 DIAGNOSIS — G47.33 OBSTRUCTIVE SLEEP APNEA: ICD-10-CM

## 2025-06-27 DIAGNOSIS — I48.0 PAROXYSMAL ATRIAL FIBRILLATION (HCC): ICD-10-CM

## 2025-06-27 DIAGNOSIS — E66.813 CLASS 3 SEVERE OBESITY DUE TO EXCESS CALORIES WITH SERIOUS COMORBIDITY AND BODY MASS INDEX (BMI) OF 40.0 TO 44.9 IN ADULT: Primary | ICD-10-CM

## 2025-06-27 DIAGNOSIS — I27.20 PULMONARY HYPERTENSION (HCC): ICD-10-CM

## 2025-06-27 SDOH — ECONOMIC STABILITY: FOOD INSECURITY: WITHIN THE PAST 12 MONTHS, YOU WORRIED THAT YOUR FOOD WOULD RUN OUT BEFORE YOU GOT THE MONEY TO BUY MORE.: NEVER TRUE

## 2025-06-27 SDOH — ECONOMIC STABILITY: FOOD INSECURITY: WITHIN THE PAST 12 MONTHS, THE FOOD YOU BOUGHT JUST DIDN'T LAST AND YOU DIDN'T HAVE MONEY TO GET MORE.: NEVER TRUE

## 2025-06-27 SDOH — ECONOMIC STABILITY: TRANSPORTATION INSECURITY: IN THE PAST 12 MONTHS, HAS LACK OF TRANSPORTATION KEPT YOU FROM MEDICAL APPOINTMENTS OR FROM GETTING MEDICATIONS?: NO

## 2025-06-27 SDOH — ECONOMIC STABILITY: FOOD INSECURITY: HOW HARD IS IT FOR YOU TO PAY FOR THE VERY BASICS LIKE FOOD, HOUSING, MEDICAL CARE, AND HEATING?: NOT HARD AT ALL

## 2025-06-27 ASSESSMENT — ACTIVITIES OF DAILY LIVING (ADL): LACK_OF_TRANSPORTATION: NO

## 2025-06-27 ASSESSMENT — FIBROSIS 4 INDEX: FIB4 SCORE: 1.4

## 2025-06-27 ASSESSMENT — PAIN SCALES - GENERAL: PAINLEVEL_OUTOF10: NO PAIN

## 2025-06-27 NOTE — PROGRESS NOTES
Comprehensive Health Assessment Program     Ruby Madgline Emmerling is a 79 y.o. here for her comprehensive health assessment.    Patient Active Problem List    Diagnosis Date Noted    Edema 08/27/2024    Ectatic thoracic aorta (HCC) 08/01/2024    History of colon polyps 09/13/2023    Pulmonary hypertension (HCC) RVSP 30 10/26/2022    Pain of right hand 10/26/2022    Obstructive sleep apnea 05/19/2022    Secondary hypercoagulable state (HCC) 09/23/2021    Paroxysmal atrial fibrillation (HCC) 06/24/2021    Disorder of arteries and arterioles, unspecified (HCC) 06/22/2021    Essential hypertension_off pharmacotherapy 10/15/2018    Chronic bilateral low back pain without sciatica 10/15/2018    Class 3 severe obesity due to excess calories with serious comorbidity and body mass index (BMI) of 40.0 to 44.9 in adult 06/15/2017       Current Medications[1]       Current supplements as per medication list.     Allergies:   Demerol  Social History[2]  Family History   Problem Relation Age of Onset    Cancer Mother         Uterine/Lymphnode/Metastisized    Heart Disease Sister     Cancer Sister         Uterian    Breast Cancer Maternal Aunt     Cancer Sister         Skin    Other Sister         something eating her from inside out/rotting skin    Suicide Attempts Father     No Known Problems Brother     No Known Problems Brother     Heart Disease Daughter         HO Congenital heart disease    Other Daughter         epilepsy     Luda  has a past medical history of Breast lump, Chickenpox, Dental disorder, Nepali measles, Heart burn, Low HDL (under 40) (10/15/2018), Sleep apnea, and Urinary incontinence.    She has no past medical history of Diabetes or Unspecified hemorrhagic conditions.   Past Surgical History[3]    Screening:  In the last six months have you experienced any leakage of urine? Yes, manageable at this point    Depression Screening  Little interest or pleasure in doing things?  0 - not at all  Feeling down,  depressed , or hopeless? 0 - not at all  Patient Health Questionnaire Score: 0     If depressive symptoms identified deferred to follow up visit unless specifically addressed in assessment and plan.    Interpretation of PHQ-9 Total Score   Score Severity   1-4 No Depression   5-9 Mild Depression   10-14 Moderate Depression   15-19 Moderately Severe Depression   20-27 Severe Depression    Screening for Cognitive Impairment  Do you or any of your friends or family members have any concern about your memory? No  Three Minute Recall (Village, Kitchen, Baby) 3/3    Gonzalo clock face with all 12 numbers and set the hands to show 10 minutes past 11.  Yes 5  Cognitive concerns identified deferred for follow up unless specifically addressed in assessment and plan.    Fall Risk Assessment  Has the patient had two or more falls in the last year or any fall with injury in the last year?  No    Safety Assessment  Do you always wear your seatbelt?  Yes  Any changes to home needed to function safely? No  Difficulty hearing.  No  Patient counseled about all safety risks that were identified.    Functional Assessment ADLs  Are there any barriers preventing you from cooking for yourself or meeting nutritional needs?  No.    Are there any barriers preventing you from driving safely or obtaining transportation?  No.    Are there any barriers preventing you from using a telephone or calling for help?  No    Are there any barriers preventing you from shopping?  No.    Are there any barriers preventing you from taking care of your own finances?  No    Are there any barriers preventing you from managing your medications?  No    Are there any barriers preventing you from showering, bathing or dressing yourself? No    Are there any barriers preventing you from doing housework or laundry? No  Are there any barriers preventing you from using the toilet?No  Are you currently engaging in any exercise or physical activity?  Yes. 7 days a week --  walking for 1 hour    Self-Assessment of Health  What is your perception of your health? Good    Do you sleep more than six hours a night? Yes    In the past 7 days, how much did pain keep you from doing your normal work? None    Do you spend quality time with family or friends (virtually or in person)? Yes    Do you usually eat a heart healthy diet that constists of a variety of fruits, vegetables, whole grains and fiber? Yes    Do you eat foods high in fat and/or Fast Food more than three times per week? No    How concerned are you that your medical conditions are not being well managed? Not at all    Are you worried that in the next 2 months, you may not have stable housing that you own, rent, or stay in as part of a household? No      Advance Care Planning  Do you have an Advance Directive, Living Will, Durable Power of , or POLST? No  Provided patient with educational brochure regarding Advance Care Planning.                  Health Maintenance Summary            Needs Review       Hepatitis C Screening  Tentatively Complete      06/25/2021  Hepatitis C Antibody component of HEP C VIRUS ANTIBODY                      Upcoming       COVID-19 Vaccine (4 - 2024-25 season) Postponed until 3/20/2026      12/20/2021  Imm Admin: MODERNA SARS-COV-2 VACCINE (12+)    04/10/2021  Imm Admin: MODERNA SARS-COV-2 VACCINE (12+)    03/12/2021  Imm Admin: MODERNA SARS-COV-2 VACCINE (12+)              Mammogram (Yearly) Next due on 6/6/2026 06/06/2025  MA-SCREENING MAMMO BILAT W/TOMOSYNTHESIS W/CAD    12/15/2023  MA-SCREENING MAMMO BILAT W/TOMOSYNTHESIS W/CAD    11/15/2022  MA-SCREENING MAMMO BILAT W/TOMOSYNTHESIS W/CAD    07/02/2021  MA-SCREENING MAMMO BILAT W/TOMOSYNTHESIS W/CAD    11/28/2018  MA-SCREENING MAMMO BILAT W/TOMOSYNTHESIS W/CAD     Only the first 5 history entries have been loaded, but more history exists.            Annual Wellness Visit (Yearly) Next due on 6/27/2026 06/27/2025  Level of Service:  FL ANNUAL WELLNESS VISIT-INCLUDES PPPS SUBSEQUE*    08/02/2024  Level of Service: FL ANNUAL WELLNESS VISIT-INCLUDES PPPS SUBSEQUE*    06/16/2023  Level of Service: FL ANNUAL WELLNESS VISIT-INCLUDES PPPS SUBSEQUE*    05/20/2022  Level of Service: FL ANNUAL WELLNESS VISIT-INCLUDES PPPS SUBSEQUE*    06/22/2021  Level of Service: FL ANNUAL WELLNESS VISIT-INCLUDES PPPS SUBSEQUE*      Only the first 5 history entries have been loaded, but more history exists.              Colorectal Cancer Screening (Colonoscopy - Every 3 Years) Next due on 9/13/2026 09/13/2023  Surgical Procedure: FL COLONOSCOPY,DIAGNOSTIC    07/06/2021  OCCULT BLOOD FECES IMMUNOASSAY (FIT)    11/12/2018  OCCULT BLOOD FECES IMMUNOASSAY              Bone Density Scan (Every 5 Years) Next due on 12/15/2028      12/15/2023  DS-BONE DENSITY STUDY (DEXA)    11/28/2018  DS-BONE DENSITY STUDY (DEXA)              IMM DTaP/Tdap/Td Vaccine (2 - Td or Tdap) Next due on 7/8/2031 07/08/2021  Imm Admin: Tdap Vaccine                      Completed or No Longer Recommended       Influenza Vaccine (Series Information) Completed      08/03/2024  Imm Admin: Influenza Vaccine Adult HD    09/16/2023  Imm Admin: Influenza Vaccine Adult HD    10/26/2022  Imm Admin: Influenza Vaccine Adult HD    09/23/2021  Imm Admin: Influenza Vaccine Adult HD    10/10/2020  Imm Admin: Influenza Vaccine Quad Recombinant      Only the first 5 history entries have been loaded, but more history exists.              Zoster (Shingles) Vaccines (Series Information) Completed      09/23/2021  Imm Admin: Zoster Vaccine Recombinant (RZV) (SHINGRIX)    07/08/2021  Imm Admin: Zoster Vaccine Recombinant (RZV) (SHINGRIX)              Pneumococcal Vaccine: 50+ Years (Series Information) Completed      01/29/2019  Imm Admin: Pneumococcal polysaccharide vaccine (PPSV-23)    12/04/2017  Imm Admin: Pneumococcal Conjugate Vaccine (Prevnar/PCV-13)              Hepatitis A Vaccine (Hep A) (Series  "Information) Aged Out      No completion history exists for this topic.              HPV Vaccines (Series Information) Aged Out     No completion history exists for this topic.              Polio Vaccine (Inactivated Polio) (Series Information) Aged Out     No completion history exists for this topic.              Meningococcal Immunization (Series Information) Aged Out     No completion history exists for this topic.              Meningococcal B Vaccine (Series Information) Aged Out     No completion history exists for this topic.              Hepatitis B Vaccine (Hep B)  Discontinued     No completion history exists for this topic.                            Patient Care Team:  Kenna Alamo D.O. as PCP - General (Internal Medicine)  Baltazar Raphael Ass't as    Rene as Respiratory Therapist (DME Supplier)  Ignacia Ramirez P.A.-C. (Sleep Medicine)      Financial Resource Strain: Low Risk  (6/27/2025)    Overall Financial Resource Strain (CARDIA)     Difficulty of Paying Living Expenses: Not hard at all      Transportation Needs: No Transportation Needs (6/27/2025)    PRAPARE - Transportation     Lack of Transportation (Medical): No     Lack of Transportation (Non-Medical): No      Food Insecurity: No Food Insecurity (6/27/2025)    Hunger Vital Sign     Worried About Running Out of Food in the Last Year: Never true     Ran Out of Food in the Last Year: Never true        Encounter Vitals  Blood Pressure : 122/74  Pulse: 70  Pulse Oximetry: 96 %  Weight: 103 kg (228 lb)  Height: 160 cm (5' 3\")  BMI (Calculated): 40.39  Pain Score: No pain     Physical Exam:  Constitutional: NAD  HENMT: NC/AT, EOMI,   Cardiovascular: RRR, No m/r/g  Lungs: CTAB, no w/r/r  Extremities: No c/c/e  Skin: No lesions notes  Neurologic: Alert & oriented x3, CN II-XII grossly intact    Assessment and Plan. The following treatment and monitoring plan is recommended:  Paroxysmal atrial fibrillation " (HCC)  Chronic, stable. Pt maintains Eliquis 5mg BID for stroke prophylaxis. Denies palpitations since October. Follow up with cardiology per routine for continued monitoring and management.     Pulmonary hypertension (HCC) RVSP 30  Chronic, stable. Noted on 2021 echocardiogram with RVSP mildly elevated at 30. No further imaging for review. Pt denies dyspnea except with pushing herself with exercise. Follow up with PCP.    Class 3 severe obesity due to excess calories with serious comorbidity and body mass index (BMI) of 40.0 to 44.9 in adult  Chronic, improving. BMI today 40.39. She is down 25lb since November after starting semaglutide. Associated with KOFI, hx of HTN. Continue to encourage healthy calorie conscious diet with regular physical activity with goal of weight loss. Follow up with PCP at least annually for continued monitoring and management.     Obstructive sleep apnea  Chronic, stable. Pt maintains on CPAP nightly. Follow up with sleep medicine per routine     Services suggested: No services needed at this time  Health Care Screening: Age-appropriate preventive services recommended by USPTF and ACIP covered by Medicare were discussed today. Services ordered if indicated and agreed upon by the patient.  Referrals offered: Community-based lifestyle interventions to reduce health risks and promote self-management and wellness, fall prevention, nutrition, physical activity, tobacco-use cessation, weight loss, and mental health services as per orders if indicated.    Discussion today about general wellness and lifestyle habits:    Prevent falls and reduce trip hazards; Cautioned about securing or removing rugs.  Have a working fire alarm and carbon monoxide detector.  Engage in regular physical activity and social activities.    Follow-up: No follow-ups on file.              [1]   Current Outpatient Medications   Medication Sig Dispense Refill    Semaglutide (WEGOVY) 0.25 MG/0.5ML Solution Auto-injector  Pen-injector Inject 1 mL (2 pens) under the skin every 7 days. 4 week sample to equal 0.5 mg weekly 4 mL 0    apixaban (ELIQUIS) 5mg Tab Take 1 Tablet by mouth 2 times a day. 200 Tablet 3    ZEPBOUND 5 MG/0.5ML Solution Auto-injector Inject 5 mg under the skin every 7 days. 2 mL 11    COLLAGEN PO Take  by mouth. 1 scoop powder daily      Misc Natural Products (FOCUSED MIND PO) Take 2 Capsules by mouth every day.      SUPER B COMPLEX/C PO Take 1 capsule by mouth every day.      MAGNESIUM PO Take 1 Tablet by mouth. Unable to recall dose      vitamin D (CHOLECALCIFEROL) 1000 UNIT Tab Take 1,000 Units by mouth every day.      POTASSIUM PO Take 1 Tablet by mouth every day. Unable to recall dose       No current facility-administered medications for this visit.   [2]   Social History  Tobacco Use    Smoking status: Former     Current packs/day: 0.00     Average packs/day: 2.0 packs/day for 32.0 years (64.0 ttl pk-yrs)     Types: Cigarettes     Start date: 1966     Quit date: 1998     Years since quittin.0    Smokeless tobacco: Never   Vaping Use    Vaping status: Never Used   Substance Use Topics    Alcohol use: Not Currently    Drug use: No   [3]   Past Surgical History:  Procedure Laterality Date    DC COLONOSCOPY-FLEXIBLE  2023    Procedure: AVERAGE RISK SCREENING COLONOSCOPY;  Surgeon: Gennaro Fleming M.D.;  Location: SURGERY [facility];  Service: Gastroenterology    ANKLE FUSION      CHOLECYSTECTOMY        14

## 2025-07-17 ENCOUNTER — TELEPHONE (OUTPATIENT)
Dept: SLEEP MEDICINE | Facility: MEDICAL CENTER | Age: 80
End: 2025-07-17
Payer: MEDICARE

## 2025-07-18 ENCOUNTER — OFFICE VISIT (OUTPATIENT)
Dept: SLEEP MEDICINE | Facility: MEDICAL CENTER | Age: 80
End: 2025-07-18
Attending: PHYSICIAN ASSISTANT
Payer: MEDICARE

## 2025-07-18 VITALS
OXYGEN SATURATION: 94 % | BODY MASS INDEX: 40.13 KG/M2 | RESPIRATION RATE: 16 BRPM | SYSTOLIC BLOOD PRESSURE: 124 MMHG | WEIGHT: 226.5 LBS | DIASTOLIC BLOOD PRESSURE: 72 MMHG | HEART RATE: 66 BPM | HEIGHT: 63 IN

## 2025-07-18 DIAGNOSIS — G47.33 OSA (OBSTRUCTIVE SLEEP APNEA): Primary | ICD-10-CM

## 2025-07-18 PROCEDURE — 99213 OFFICE O/P EST LOW 20 MIN: CPT | Performed by: PHYSICIAN ASSISTANT

## 2025-07-18 PROCEDURE — 3074F SYST BP LT 130 MM HG: CPT | Performed by: PHYSICIAN ASSISTANT

## 2025-07-18 PROCEDURE — 3078F DIAST BP <80 MM HG: CPT | Performed by: PHYSICIAN ASSISTANT

## 2025-07-18 ASSESSMENT — ENCOUNTER SYMPTOMS
FEVER: 0
CHILLS: 0
INSOMNIA: 0
SORE THROAT: 0
DIZZINESS: 0
SINUS PAIN: 0
SHORTNESS OF BREATH: 0
ORTHOPNEA: 0
HEADACHES: 0
WHEEZING: 0
HEARTBURN: 0
TREMORS: 0
PALPITATIONS: 0
SPUTUM PRODUCTION: 0
COUGH: 1
WEIGHT LOSS: 0

## 2025-07-18 ASSESSMENT — FIBROSIS 4 INDEX: FIB4 SCORE: 1.42

## 2025-07-18 NOTE — PROGRESS NOTES
"Chief Complaint   Patient presents with    Apnea     Last Office Visit 4/4/25 with Ignacia Ramirez P.A.-C.    PAP/O2/OAT: auto CPAP @8-16 cm H20       HPI:  Ruby Madgline Emmerling is a 80 y.o. year old female here today for follow-up on obstructive sleep apnea.  Last seen in clinic 4/4/2025 by RASHID Reid.  Previously evaluated by Dr. Obie Waters on 8/12/2021.    Past Medical History: KOFI, morbid obesity, paroxysmal atrial fibrillation, secondary hypercoagulable state, essential hypertension, pulmonary hypertension, chronic low back pain, ectatic thoracic aorta. She is a former smoker with reported 32-pack-year history and quit date in 1998.     Vitals:  /72 (BP Location: Left arm, Patient Position: Sitting, BP Cuff Size: Large adult)   Pulse 66   Resp 16   Ht 1.6 m (5' 3\")   Wt 103 kg (226 lb 8 oz)   SpO2 94% BMI 40 kg/m²    Recent Imaging: None    Echocardiogram obtained 9/16/2021 demonstrated normal left ventricular chamber size, wall thickness, systolic and diastolic function. LVEF estimated 70%. Normal right ventricular size and systolic function. Trace mitral regurgitation, trace tricuspid regurgitation, estimated RVSP of 30 mmHg.     Currently using  Resmed auto CPAP @8-16 cm H20 pressure; compliance reviewed for 6/18/2025 through 7/17/2025, days used 30/30, average daily usage 7 hours 10 minutes, 100% of days greater than or equal to 4 hours, mask leak at 34.6 LPM at 95th percentile, AHI 1.7 per hour.  See media for full report.    Device obtained May 2023  DME provider [facility]   Mask interface PAP from x 1-2    Overnight home sleep study obtained 2/6/2023 demonstrating findings consistent with mild obstructive sleep apnea with pAHI of 9 events per hour and P RDI of 27.3 events per hour. Low O2 sat of 83% with sats less than or equal to 88% for 3.1 minutes of evaluated time. Recommendation was made for titration study versus auto CPAP trial.     Sleep schedule goes to bed 7 PM, wakens 3 AM , " and gets up during the night bathroom x 1   Symptoms denies day time somnolence and denies morning headache    Stop Bang Score reported as 5 on 2023  Sleep    Review of Systems   Constitutional:  Negative for chills, fever, malaise/fatigue and weight loss.   HENT:  Negative for congestion, hearing loss, nosebleeds, sinus pain, sore throat and tinnitus.    Eyes:         Presc glasses   Respiratory:  Positive for cough. Negative for sputum production, shortness of breath and wheezing.    Cardiovascular:  Negative for chest pain, palpitations (improved), orthopnea and leg swelling.   Gastrointestinal:  Negative for heartburn.        Partial upper, missing 4 bottom, no swallowing issues    Neurological:  Negative for dizziness, tremors and headaches.   Psychiatric/Behavioral:  The patient does not have insomnia.        Past Medical History[1]    Past Surgical History[2]    Family History   Problem Relation Age of Onset    Cancer Mother         Uterine/Lymphnode/Metastisized    Heart Disease Sister     Cancer Sister         Uterian    Breast Cancer Maternal Aunt     Cancer Sister         Skin    Other Sister         something eating her from inside out/rotting skin    Suicide Attempts Father     No Known Problems Brother     No Known Problems Brother     Heart Disease Daughter         HO Congenital heart disease    Other Daughter         epilepsy       Social History     Socioeconomic History    Marital status: Single     Spouse name: Not on file    Number of children: Not on file    Years of education: Not on file    Highest education level: Not on file   Occupational History    Not on file   Tobacco Use    Smoking status: Former     Current packs/day: 0.00     Average packs/day: 2.0 packs/day for 32.0 years (64.0 ttl pk-yrs)     Types: Cigarettes     Start date: 1966     Quit date: 1998     Years since quittin.0    Smokeless tobacco: Never   Vaping Use    Vaping status: Never Used   Substance and  Sexual Activity    Alcohol use: Not Currently    Drug use: No    Sexual activity: Never     Partners: Male   Other Topics Concern    Not on file   Social History Narrative    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[employer]     Social Drivers of Health     Financial Resource Strain: Low Risk  (6/27/2025)    Overall Financial Resource Strain (CARDIA)     Difficulty of Paying Living Expenses: Not hard at all   Food Insecurity: No Food Insecurity (6/27/2025)    Hunger Vital Sign     Worried About Running Out of Food in the Last Year: Never true     Ran Out of Food in the Last Year: Never true   Transportation Needs: No Transportation Needs (6/27/2025)    PRAPARE - Transportation     Lack of Transportation (Medical): No     Lack of Transportation (Non-Medical): No   Physical Activity: Not on file   Stress: Not on file   Social Connections: Not on file   Intimate Partner Violence: Not on file   Housing Stability: Not on file       Allergies as of 07/18/2025 - Reviewed 07/18/2025   Allergen Reaction Noted    Demerol Vomiting 02/07/2013          Current medications as of today Current Medications[3]      Physical Exam:   Gen:           Alert and oriented, No apparent distress. Mood and affect appropriate, normal interaction with examiner.   Hearing:     Grossly intact.  Nose:          Normal, no lesions or deformities.  Dentition:    Poor dentition.   Oropharynx:   Tongue normal, posterior pharynx without erythema or exudate.  Mallampati Classification: III  Neck:        Supple, trachea midline, no masses.  Respiratory Effort: No intercostal retractions or use of accessory muscles.   Gait and Station: Grossly normal.  Digits and Nails: No clubbing, cyanosis, petechiae, or nodes.   Skin:        No rashes, lesions or ulcers noted.               Ext:           No cyanosis or edema.      Immunizations:  Flu: 8/3/2024  Pneumovax 23: 1/29/2019  Prevnar 13: 12/24/2017  SARS CoV2 Vaccine: 12/28/2021, 4/10/2021, 3/12/2021    Assessment / Plan:  1.  KOFI (obstructive sleep apnea)    Reviewed compliance which is excellent.  Improved mask leak, could consider DreamWear hybrid if desired.  Mask and supplies ordered at last visit.  Patient reminded to use distilled water only with fresh fill daily.  Reviewed equipment cleaning and equipment replacement scheduled.  Follow-up 9 months, sooner if needed.      Follow-up:   Return in about 9 months (around 4/18/2026) for Return with Ignacia Ramirez PA-C.    Please note that this dictation was created using voice recognition software. I have made every reasonable attempt to correct obvious errors, but it is possible there are errors of grammar and possibly content that I did not discover before finalizing the note.           [1]   Past Medical History:  Diagnosis Date    Breast lump     lumpectomy    Chickenpox     Dental disorder     upper partials    [place] measles     Heart burn     with spicy foods    Low HDL (under 40) 10/15/2018      Ref. Range 6/25/2021 08:25  Cholesterol,Tot Latest Ref Range: 100 - 199 mg/dL 154  Triglycerides Latest Ref Range: 0 - 149 mg/dL 98  HDL Latest Ref Range: >=40 mg/dL 38 (A)  LDL Latest Ref Range: <100 mg/dL 96   She has history of mild LDL elevation.  She had an abnormal EKG since 2013 with Q waves in anterior leads suggestive of prior anterior infarct.  Nuclear stress test in 2013 did not show     Sleep apnea     uses CPAP    Urinary incontinence    [2]   Past Surgical History:  Procedure Laterality Date    ND COLONOSCOPY-FLEXIBLE  9/13/2023    Procedure: AVERAGE RISK SCREENING COLONOSCOPY;  Surgeon: Gennaro Fleming M.D.;  Location: SURGERY [facility];  Service: Gastroenterology    ANKLE FUSION      CHOLECYSTECTOMY     [3]   Current Outpatient Medications   Medication Sig Dispense Refill    Semaglutide (WEGOVY) 0.25 MG/0.5ML Solution Auto-injector Pen-injector Inject 1 mL (2 pens) under the skin every 7 days. 4 week sample to equal 0.5 mg weekly 4 mL 0    apixaban (ELIQUIS) 5mg  Tab Take 1 Tablet by mouth 2 times a day. 200 Tablet 3    ZEPBOUND 5 MG/0.5ML Solution Auto-injector Inject 5 mg under the skin every 7 days. 2 mL 11    COLLAGEN PO Take  by mouth. 1 scoop powder daily      Misc Natural Products (FOCUSED MIND PO) Take 2 Capsules by mouth every day.      SUPER B COMPLEX/C PO Take 1 capsule by mouth every day.      MAGNESIUM PO Take 1 Tablet by mouth. Unable to recall dose      vitamin D (CHOLECALCIFEROL) 1000 UNIT Tab Take 1,000 Units by mouth every day.      POTASSIUM PO Take 1 Tablet by mouth every day. Unable to recall dose       No current facility-administered medications for this visit.

## 2025-07-18 NOTE — PATIENT INSTRUCTIONS
1-reviewed compliance which is excellent  2-improved mask leak, could consider dreamwear hybrid if desired  3-mask and supplies ordered last visit   4-As a reminder use distilled water only in humidifier chamber.  Fresh fill daily  5-Today we reviewed equipment cleaning  once weekly minimum  mask, tubing and water chamber  use dedicated container  use mild soap and water  SoClean or other ozone  are not recommended  white vinegar and water solution is no longer recommended  hang tubing to dry  mask sanitizing wipes are an option for use   6-Equipment replacement schedule : Mask cushion every month, Head gear every 6 months, Tubing every 3 months, Ultra-fine filters 2 times per month, Humidifier chamber every 6 months  7-follow up in 9 months

## 2025-08-03 PROCEDURE — RXMED WILLOW AMBULATORY MEDICATION CHARGE: Performed by: INTERNAL MEDICINE

## 2025-08-04 ENCOUNTER — PHARMACY VISIT (OUTPATIENT)
Dept: PHARMACY | Facility: MEDICAL CENTER | Age: 80
End: 2025-08-04
Payer: COMMERCIAL

## (undated) DEVICE — KIT CUSTOM PROCEDURE SINGLE FOR ENDO  (15/CA)

## (undated) DEVICE — SYRINGE SAFETY 3 ML 18 GA X 1 1/2 BLUNT LL (100/BX 8BX/CA)

## (undated) DEVICE — PAD PREP 24 X 48 CUFFED - (100/CA)

## (undated) DEVICE — GOWN WARMING STANDARD FLEX - (30/CA)

## (undated) DEVICE — Device

## (undated) DEVICE — TUBING CLEARLINK DUO-VENT - C-FLO (48EA/CA)

## (undated) DEVICE — SYRINGE SAFETY 10 ML 18 GA X 1 1/2 BLUNT LL (100/BX 4BX/CA)

## (undated) DEVICE — KIT  I.V. START (100EA/CA)

## (undated) DEVICE — LACTATED RINGERS INJ 1000 ML - (14EA/CA 60CA/PF)

## (undated) DEVICE — SET EXTENSION WITH 2 PORTS (48EA/CA) ***PART #2C8610 IS A SUBSTITUTE*****

## (undated) DEVICE — FORCEP RADIAL JAW 4 STANDARD CAPACITY W/NEEDLE 240CM (40EA/BX)

## (undated) DEVICE — CANISTER SUCTION RIGID RED 1500CC (40EA/CA)

## (undated) DEVICE — TUBE CONNECTING SUCTION - CLEAR PLASTIC STERILE 72 IN (50EA/CA)

## (undated) DEVICE — SPONGE GAUZE NON-STERILE 4X4 - (2000/CA 10PK/CA)

## (undated) DEVICE — SENSOR OXIMETER ADULT SPO2 RD SET (20EA/BX)

## (undated) DEVICE — SYRINGE SAFETY 5 ML 18 GA X 1-1/2 BLUNT LL (100/BX 4BX/CA)

## (undated) DEVICE — TOWEL STOP TIMEOUT SAFETY FLAG (40EA/CA)

## (undated) DEVICE — COVER LIGHT HANDLE FLEXIBLE - SOFT (2EA/PK 80PK/CA)

## (undated) DEVICE — TRAP POLYP E-TRAP (25EA/BX)

## (undated) DEVICE — GOWN SURGEONS LARGE - (32/CA)

## (undated) DEVICE — CANNULA O2 COMFORT SOFT EAR ADULT 7 FT TUBING (50/CA)